# Patient Record
Sex: FEMALE | Race: BLACK OR AFRICAN AMERICAN | NOT HISPANIC OR LATINO | URBAN - METROPOLITAN AREA
[De-identification: names, ages, dates, MRNs, and addresses within clinical notes are randomized per-mention and may not be internally consistent; named-entity substitution may affect disease eponyms.]

---

## 2022-12-21 ENCOUNTER — INPATIENT (INPATIENT)
Facility: HOSPITAL | Age: 77
LOS: 9 days | Discharge: HOME HEALTH | End: 2022-12-31
Attending: HOSPITALIST | Admitting: HOSPITALIST
Payer: MEDICARE

## 2022-12-21 ENCOUNTER — EMERGENCY (EMERGENCY)
Facility: HOSPITAL | Age: 77
LOS: 0 days | Discharge: ANOTHER HOSPITAL | End: 2022-12-21
Attending: EMERGENCY MEDICINE
Payer: MEDICARE

## 2022-12-21 VITALS
HEIGHT: 64 IN | TEMPERATURE: 98 F | DIASTOLIC BLOOD PRESSURE: 71 MMHG | HEART RATE: 96 BPM | OXYGEN SATURATION: 98 % | RESPIRATION RATE: 20 BRPM | SYSTOLIC BLOOD PRESSURE: 152 MMHG

## 2022-12-21 VITALS
DIASTOLIC BLOOD PRESSURE: 56 MMHG | SYSTOLIC BLOOD PRESSURE: 126 MMHG | TEMPERATURE: 98 F | HEART RATE: 108 BPM | OXYGEN SATURATION: 98 % | RESPIRATION RATE: 20 BRPM

## 2022-12-21 VITALS
HEART RATE: 105 BPM | OXYGEN SATURATION: 95 % | SYSTOLIC BLOOD PRESSURE: 170 MMHG | RESPIRATION RATE: 18 BRPM | WEIGHT: 175.05 LBS | DIASTOLIC BLOOD PRESSURE: 69 MMHG | TEMPERATURE: 99 F | HEIGHT: 64 IN

## 2022-12-21 DIAGNOSIS — R22.2 LOCALIZED SWELLING, MASS AND LUMP, TRUNK: ICD-10-CM

## 2022-12-21 DIAGNOSIS — R06.02 SHORTNESS OF BREATH: ICD-10-CM

## 2022-12-21 DIAGNOSIS — J12.82 PNEUMONIA DUE TO CORONAVIRUS DISEASE 2019: ICD-10-CM

## 2022-12-21 DIAGNOSIS — U07.1 COVID-19: ICD-10-CM

## 2022-12-21 DIAGNOSIS — Z79.84 LONG TERM (CURRENT) USE OF ORAL HYPOGLYCEMIC DRUGS: ICD-10-CM

## 2022-12-21 DIAGNOSIS — E03.9 HYPOTHYROIDISM, UNSPECIFIED: ICD-10-CM

## 2022-12-21 DIAGNOSIS — E11.9 TYPE 2 DIABETES MELLITUS WITHOUT COMPLICATIONS: ICD-10-CM

## 2022-12-21 DIAGNOSIS — J18.9 PNEUMONIA, UNSPECIFIED ORGANISM: ICD-10-CM

## 2022-12-21 DIAGNOSIS — J98.59 OTHER DISEASES OF MEDIASTINUM, NOT ELSEWHERE CLASSIFIED: ICD-10-CM

## 2022-12-21 DIAGNOSIS — Z29.9 ENCOUNTER FOR PROPHYLACTIC MEASURES, UNSPECIFIED: ICD-10-CM

## 2022-12-21 DIAGNOSIS — I10 ESSENTIAL (PRIMARY) HYPERTENSION: ICD-10-CM

## 2022-12-21 LAB
ALBUMIN SERPL ELPH-MCNC: 3.3 G/DL — SIGNIFICANT CHANGE UP (ref 3.3–5)
ALP SERPL-CCNC: 95 U/L — SIGNIFICANT CHANGE UP (ref 40–120)
ALT FLD-CCNC: 16 U/L — SIGNIFICANT CHANGE UP (ref 12–78)
ANION GAP SERPL CALC-SCNC: 6 MMOL/L — SIGNIFICANT CHANGE UP (ref 5–17)
APPEARANCE UR: CLEAR — SIGNIFICANT CHANGE UP
AST SERPL-CCNC: 12 U/L — LOW (ref 15–37)
BACTERIA # UR AUTO: ABNORMAL
BASOPHILS # BLD AUTO: 0.01 K/UL — SIGNIFICANT CHANGE UP (ref 0–0.2)
BASOPHILS NFR BLD AUTO: 0.1 % — SIGNIFICANT CHANGE UP (ref 0–2)
BILIRUB SERPL-MCNC: 0.7 MG/DL — SIGNIFICANT CHANGE UP (ref 0.2–1.2)
BILIRUB UR-MCNC: NEGATIVE — SIGNIFICANT CHANGE UP
BUN SERPL-MCNC: 27 MG/DL — HIGH (ref 7–23)
CALCIUM SERPL-MCNC: 9.6 MG/DL — SIGNIFICANT CHANGE UP (ref 8.5–10.1)
CHLORIDE SERPL-SCNC: 101 MMOL/L — SIGNIFICANT CHANGE UP (ref 96–108)
CO2 SERPL-SCNC: 28 MMOL/L — SIGNIFICANT CHANGE UP (ref 22–31)
COLOR SPEC: YELLOW — SIGNIFICANT CHANGE UP
CREAT SERPL-MCNC: 1.08 MG/DL — SIGNIFICANT CHANGE UP (ref 0.5–1.3)
DIFF PNL FLD: ABNORMAL
EGFR: 53 ML/MIN/1.73M2 — LOW
EOSINOPHIL # BLD AUTO: 0.01 K/UL — SIGNIFICANT CHANGE UP (ref 0–0.5)
EOSINOPHIL NFR BLD AUTO: 0.1 % — SIGNIFICANT CHANGE UP (ref 0–6)
EPI CELLS # UR: SIGNIFICANT CHANGE UP
GLUCOSE BLDC GLUCOMTR-MCNC: 195 MG/DL — HIGH (ref 70–99)
GLUCOSE SERPL-MCNC: 285 MG/DL — HIGH (ref 70–99)
GLUCOSE UR QL: 100 MG/DL
HCT VFR BLD CALC: 38 % — SIGNIFICANT CHANGE UP (ref 34.5–45)
HGB BLD-MCNC: 13.2 G/DL — SIGNIFICANT CHANGE UP (ref 11.5–15.5)
IMM GRANULOCYTES NFR BLD AUTO: 0.4 % — SIGNIFICANT CHANGE UP (ref 0–0.9)
KETONES UR-MCNC: NEGATIVE — SIGNIFICANT CHANGE UP
LACTATE SERPL-SCNC: 0.7 MMOL/L — SIGNIFICANT CHANGE UP (ref 0.7–2)
LEUKOCYTE ESTERASE UR-ACNC: ABNORMAL
LYMPHOCYTES # BLD AUTO: 0.82 K/UL — LOW (ref 1–3.3)
LYMPHOCYTES # BLD AUTO: 7.6 % — LOW (ref 13–44)
MCHC RBC-ENTMCNC: 29.1 PG — SIGNIFICANT CHANGE UP (ref 27–34)
MCHC RBC-ENTMCNC: 34.7 G/DL — SIGNIFICANT CHANGE UP (ref 32–36)
MCV RBC AUTO: 83.7 FL — SIGNIFICANT CHANGE UP (ref 80–100)
MONOCYTES # BLD AUTO: 0.56 K/UL — SIGNIFICANT CHANGE UP (ref 0–0.9)
MONOCYTES NFR BLD AUTO: 5.2 % — SIGNIFICANT CHANGE UP (ref 2–14)
NEUTROPHILS # BLD AUTO: 9.28 K/UL — HIGH (ref 1.8–7.4)
NEUTROPHILS NFR BLD AUTO: 86.6 % — HIGH (ref 43–77)
NITRITE UR-MCNC: NEGATIVE — SIGNIFICANT CHANGE UP
NRBC # BLD: 0 /100 WBCS — SIGNIFICANT CHANGE UP (ref 0–0)
NT-PROBNP SERPL-SCNC: 876 PG/ML — HIGH (ref 0–450)
PH UR: 6 — SIGNIFICANT CHANGE UP (ref 5–8)
PLATELET # BLD AUTO: 207 K/UL — SIGNIFICANT CHANGE UP (ref 150–400)
POTASSIUM SERPL-MCNC: 4.2 MMOL/L — SIGNIFICANT CHANGE UP (ref 3.5–5.3)
POTASSIUM SERPL-SCNC: 4.2 MMOL/L — SIGNIFICANT CHANGE UP (ref 3.5–5.3)
PROT SERPL-MCNC: 7.6 GM/DL — SIGNIFICANT CHANGE UP (ref 6–8.3)
PROT UR-MCNC: 500 MG/DL
RAPID RVP RESULT: DETECTED
RBC # BLD: 4.54 M/UL — SIGNIFICANT CHANGE UP (ref 3.8–5.2)
RBC # FLD: 12.9 % — SIGNIFICANT CHANGE UP (ref 10.3–14.5)
RBC CASTS # UR COMP ASSIST: SIGNIFICANT CHANGE UP /HPF (ref 0–4)
SARS-COV-2 RNA SPEC QL NAA+PROBE: DETECTED
SODIUM SERPL-SCNC: 135 MMOL/L — SIGNIFICANT CHANGE UP (ref 135–145)
SP GR SPEC: 1.02 — SIGNIFICANT CHANGE UP (ref 1.01–1.02)
TROPONIN I, HIGH SENSITIVITY RESULT: 9.1 NG/L — SIGNIFICANT CHANGE UP
UROBILINOGEN FLD QL: NEGATIVE MG/DL — SIGNIFICANT CHANGE UP
WBC # BLD: 10.72 K/UL — HIGH (ref 3.8–10.5)
WBC # FLD AUTO: 10.72 K/UL — HIGH (ref 3.8–10.5)
WBC UR QL: SIGNIFICANT CHANGE UP

## 2022-12-21 PROCEDURE — 99284 EMERGENCY DEPT VISIT MOD MDM: CPT

## 2022-12-21 PROCEDURE — 71045 X-RAY EXAM CHEST 1 VIEW: CPT | Mod: 26

## 2022-12-21 PROCEDURE — 71275 CT ANGIOGRAPHY CHEST: CPT | Mod: 26,MA

## 2022-12-21 PROCEDURE — 99285 EMERGENCY DEPT VISIT HI MDM: CPT

## 2022-12-21 PROCEDURE — 93010 ELECTROCARDIOGRAM REPORT: CPT | Mod: 76

## 2022-12-21 PROCEDURE — 99223 1ST HOSP IP/OBS HIGH 75: CPT

## 2022-12-21 PROCEDURE — 99222 1ST HOSP IP/OBS MODERATE 55: CPT

## 2022-12-21 PROCEDURE — 99497 ADVNCD CARE PLAN 30 MIN: CPT | Mod: 25

## 2022-12-21 RX ORDER — FUROSEMIDE 40 MG
40 TABLET ORAL ONCE
Refills: 0 | Status: COMPLETED | OUTPATIENT
Start: 2022-12-21 | End: 2022-12-21

## 2022-12-21 RX ORDER — PIPERACILLIN AND TAZOBACTAM 4; .5 G/20ML; G/20ML
3.38 INJECTION, POWDER, LYOPHILIZED, FOR SOLUTION INTRAVENOUS ONCE
Refills: 0 | Status: DISCONTINUED | OUTPATIENT
Start: 2022-12-22 | End: 2022-12-22

## 2022-12-21 RX ORDER — ATORVASTATIN CALCIUM 80 MG/1
1 TABLET, FILM COATED ORAL
Qty: 0 | Refills: 0 | DISCHARGE

## 2022-12-21 RX ORDER — GLUCAGON INJECTION, SOLUTION 0.5 MG/.1ML
1 INJECTION, SOLUTION SUBCUTANEOUS ONCE
Refills: 0 | Status: DISCONTINUED | OUTPATIENT
Start: 2022-12-21 | End: 2022-12-31

## 2022-12-21 RX ORDER — LATANOPROST 0.05 MG/ML
0 SOLUTION/ DROPS OPHTHALMIC; TOPICAL
Qty: 0 | Refills: 0 | DISCHARGE

## 2022-12-21 RX ORDER — INSULIN GLARGINE 100 [IU]/ML
26 INJECTION, SOLUTION SUBCUTANEOUS AT BEDTIME
Refills: 0 | Status: DISCONTINUED | OUTPATIENT
Start: 2022-12-22 | End: 2022-12-22

## 2022-12-21 RX ORDER — VANCOMYCIN HCL 1 G
1000 VIAL (EA) INTRAVENOUS EVERY 24 HOURS
Refills: 0 | Status: DISCONTINUED | OUTPATIENT
Start: 2022-12-21 | End: 2022-12-22

## 2022-12-21 RX ORDER — CARVEDILOL PHOSPHATE 80 MG/1
0 CAPSULE, EXTENDED RELEASE ORAL
Qty: 0 | Refills: 1 | DISCHARGE

## 2022-12-21 RX ORDER — PIPERACILLIN AND TAZOBACTAM 4; .5 G/20ML; G/20ML
3.38 INJECTION, POWDER, LYOPHILIZED, FOR SOLUTION INTRAVENOUS ONCE
Refills: 0 | Status: COMPLETED | OUTPATIENT
Start: 2022-12-21 | End: 2022-12-21

## 2022-12-21 RX ORDER — LOSARTAN POTASSIUM 100 MG/1
100 TABLET, FILM COATED ORAL DAILY
Refills: 0 | Status: DISCONTINUED | OUTPATIENT
Start: 2022-12-21 | End: 2022-12-22

## 2022-12-21 RX ORDER — INSULIN LISPRO 100/ML
VIAL (ML) SUBCUTANEOUS
Refills: 0 | Status: DISCONTINUED | OUTPATIENT
Start: 2022-12-21 | End: 2022-12-22

## 2022-12-21 RX ORDER — DEXTROSE 50 % IN WATER 50 %
25 SYRINGE (ML) INTRAVENOUS ONCE
Refills: 0 | Status: DISCONTINUED | OUTPATIENT
Start: 2022-12-21 | End: 2022-12-31

## 2022-12-21 RX ORDER — LATANOPROST 0.05 MG/ML
1 SOLUTION/ DROPS OPHTHALMIC; TOPICAL AT BEDTIME
Refills: 0 | Status: DISCONTINUED | OUTPATIENT
Start: 2022-12-21 | End: 2022-12-31

## 2022-12-21 RX ORDER — INSULIN LISPRO 100/ML
VIAL (ML) SUBCUTANEOUS AT BEDTIME
Refills: 0 | Status: DISCONTINUED | OUTPATIENT
Start: 2022-12-21 | End: 2022-12-22

## 2022-12-21 RX ORDER — ATORVASTATIN CALCIUM 80 MG/1
40 TABLET, FILM COATED ORAL AT BEDTIME
Refills: 0 | Status: DISCONTINUED | OUTPATIENT
Start: 2022-12-21 | End: 2022-12-31

## 2022-12-21 RX ORDER — LEVOTHYROXINE SODIUM 125 MCG
0 TABLET ORAL
Qty: 0 | Refills: 0 | DISCHARGE

## 2022-12-21 RX ORDER — CARVEDILOL PHOSPHATE 80 MG/1
25 CAPSULE, EXTENDED RELEASE ORAL EVERY 12 HOURS
Refills: 0 | Status: DISCONTINUED | OUTPATIENT
Start: 2022-12-21 | End: 2022-12-31

## 2022-12-21 RX ORDER — REMDESIVIR 5 MG/ML
INJECTION INTRAVENOUS
Refills: 0 | Status: COMPLETED | OUTPATIENT
Start: 2022-12-21 | End: 2022-12-26

## 2022-12-21 RX ORDER — NIFEDIPINE 30 MG
60 TABLET, EXTENDED RELEASE 24 HR ORAL EVERY 12 HOURS
Refills: 0 | Status: DISCONTINUED | OUTPATIENT
Start: 2022-12-21 | End: 2022-12-22

## 2022-12-21 RX ORDER — TELMISARTAN AND HYDROCHLOROTHIAZIDE 40; 12.5 MG/1; MG/1
1 TABLET ORAL
Qty: 0 | Refills: 0 | DISCHARGE

## 2022-12-21 RX ORDER — NIFEDIPINE 30 MG
60 TABLET, EXTENDED RELEASE 24 HR ORAL
Qty: 0 | Refills: 0 | DISCHARGE

## 2022-12-21 RX ORDER — LEVOTHYROXINE SODIUM 125 MCG
50 TABLET ORAL DAILY
Refills: 0 | Status: DISCONTINUED | OUTPATIENT
Start: 2022-12-21 | End: 2022-12-31

## 2022-12-21 RX ORDER — DEXTROSE 50 % IN WATER 50 %
12.5 SYRINGE (ML) INTRAVENOUS ONCE
Refills: 0 | Status: DISCONTINUED | OUTPATIENT
Start: 2022-12-21 | End: 2022-12-31

## 2022-12-21 RX ORDER — SODIUM CHLORIDE 9 MG/ML
2500 INJECTION INTRAMUSCULAR; INTRAVENOUS; SUBCUTANEOUS ONCE
Refills: 0 | Status: COMPLETED | OUTPATIENT
Start: 2022-12-21 | End: 2022-12-21

## 2022-12-21 RX ORDER — INSULIN GLARGINE 100 [IU]/ML
26 INJECTION, SOLUTION SUBCUTANEOUS ONCE
Refills: 0 | Status: COMPLETED | OUTPATIENT
Start: 2022-12-21 | End: 2022-12-21

## 2022-12-21 RX ORDER — PIPERACILLIN AND TAZOBACTAM 4; .5 G/20ML; G/20ML
3.38 INJECTION, POWDER, LYOPHILIZED, FOR SOLUTION INTRAVENOUS EVERY 6 HOURS
Refills: 0 | Status: DISCONTINUED | OUTPATIENT
Start: 2022-12-22 | End: 2022-12-22

## 2022-12-21 RX ORDER — DEXTROSE 50 % IN WATER 50 %
15 SYRINGE (ML) INTRAVENOUS ONCE
Refills: 0 | Status: DISCONTINUED | OUTPATIENT
Start: 2022-12-21 | End: 2022-12-31

## 2022-12-21 RX ORDER — ACETAMINOPHEN 500 MG
650 TABLET ORAL EVERY 6 HOURS
Refills: 0 | Status: DISCONTINUED | OUTPATIENT
Start: 2022-12-21 | End: 2022-12-31

## 2022-12-21 RX ORDER — SODIUM CHLORIDE 9 MG/ML
1000 INJECTION, SOLUTION INTRAVENOUS
Refills: 0 | Status: DISCONTINUED | OUTPATIENT
Start: 2022-12-21 | End: 2022-12-31

## 2022-12-21 RX ORDER — ACETAMINOPHEN 500 MG
650 TABLET ORAL ONCE
Refills: 0 | Status: COMPLETED | OUTPATIENT
Start: 2022-12-21 | End: 2022-12-21

## 2022-12-21 RX ORDER — METFORMIN HYDROCHLORIDE 850 MG/1
1 TABLET ORAL
Qty: 0 | Refills: 0 | DISCHARGE

## 2022-12-21 RX ORDER — CARVEDILOL PHOSPHATE 80 MG/1
1 CAPSULE, EXTENDED RELEASE ORAL
Qty: 0 | Refills: 0 | DISCHARGE

## 2022-12-21 RX ORDER — LANOLIN ALCOHOL/MO/W.PET/CERES
3 CREAM (GRAM) TOPICAL AT BEDTIME
Refills: 0 | Status: DISCONTINUED | OUTPATIENT
Start: 2022-12-21 | End: 2022-12-31

## 2022-12-21 RX ORDER — ONDANSETRON 8 MG/1
4 TABLET, FILM COATED ORAL EVERY 8 HOURS
Refills: 0 | Status: DISCONTINUED | OUTPATIENT
Start: 2022-12-21 | End: 2022-12-23

## 2022-12-21 RX ADMIN — Medication 650 MILLIGRAM(S): at 08:38

## 2022-12-21 RX ADMIN — PIPERACILLIN AND TAZOBACTAM 200 GRAM(S): 4; .5 INJECTION, POWDER, LYOPHILIZED, FOR SOLUTION INTRAVENOUS at 23:36

## 2022-12-21 RX ADMIN — Medication 40 MILLIGRAM(S): at 12:49

## 2022-12-21 RX ADMIN — SODIUM CHLORIDE 2500 MILLILITER(S): 9 INJECTION INTRAMUSCULAR; INTRAVENOUS; SUBCUTANEOUS at 08:38

## 2022-12-21 RX ADMIN — Medication 650 MILLIGRAM(S): at 08:39

## 2022-12-21 NOTE — ED PROVIDER NOTE - OBJECTIVE STATEMENT
77-year-old woman with history of hypertension, hyperlipidemia, COVID-positive 2 days ago, transferred from [facility] after a mediastinal mass was discovered on CTA chest.  She reports feeling progressively weak over the last several days and presented to the ED due to shortness of breath, during evaluation she was noted to have desaturated to the 70s requiring BiPAP.  Patient was transferred for cardiothoracic surgery evaluation after mediastinal mass was found to be partially compressing the esophagus and trachea.  She reports feeling better this time but endorses some ongoing shortness of breath.

## 2022-12-21 NOTE — H&P ADULT - HISTORY OF PRESENT ILLNESS
78 y/o female with PMHx HTN, DM initially presented to [facility] with complaints of SOB/ dyspnea starting since Sunday.  States went to  at that time tested COVID + on sunday given paxlovide, symptoms have persisted and presented to ED today 78 y/o female with PMHx HTN, DM initially presented to [facility] with complaints of SOB/ dyspnea starting since Sunday. Went to  at that time and tested COVID+ on sunday. Pt given paxlovid, but symptoms persisted prompting her to go to [facility] ED for evaluation. At [facility] pt with worsening dyspnea requiring BiPAP. CTA PE done which was negative but showed large mediastinal mass with mass effect on trachea and esophagus. Pt transferred to [facility] for thoracic surgery evaluation.       78 y/o female with PMHx HTN, DM initially presented to [facility] with complaints of SOB/ dyspnea starting since this morning. Pt reports that her sx started on sunday with rhinorrhea and intermittent cough. She has a known exposure to COVID at work and went to  on sunday and was tested COVID+ on sunday. Pt given paxlovid, but symptoms persisted worsened and she began experiencing SOB/ CARVAJAL prompting her to go to [facility] ED for evaluation. At [facility] pt with worsening dyspnea requiring BiPAP. Pt given Levaquin and lasix x1. CTA PE done which was negative but showed large mediastinal mass with mass effect on trachea and esophagus. Pt transferred to [facility] for thoracic surgery evaluation.    Currently, pt reports that she is feeling better; on 6L NC. Denies any fevers, chills, HA, CP, n/v/d/c, abdominal pain, or muscle aches. Reports she has hx of CVI and has lower extremity swelling that improves at night time.

## 2022-12-21 NOTE — H&P ADULT - PROBLEM SELECTOR PLAN 7
DVT: LMWH  Diet: DASH/carb consistent  Dispo: pending clinical improvement    Full code Pt with hx of hypothyroidism, c/w levothyroxine 50mcg

## 2022-12-21 NOTE — H&P ADULT - NSHPREVIEWOFSYSTEMS_GEN_ALL_CORE
CONSTITUTIONAL: No fever, no chills, no weight loss  EYES: No eye pain, no visual disturbance  RESPIRATORY: No cough, + SOB  CARDIOVASCULAR: No CP, no palpitations  GASTROINTESTINAL: no abdominal pain, no n/v/d  GENITOURINARY: No dysuria, no hematuria  HEME: No easy bruising, no bleeding gums  NEURO: No headaches, no weakness  SKIN: No itching, no rashes  MSK: No joint pain, no joint swelling CONSTITUTIONAL: No fever, no chills, no weight loss  EYES: No eye pain, no visual disturbance  RESPIRATORY: + cough, + SOB  CARDIOVASCULAR: No CP, no palpitations  GASTROINTESTINAL: no abdominal pain, no n/v/d  GENITOURINARY: No dysuria, no hematuria  HEME: No easy bruising, no bleeding gums  NEURO: No headaches, no weakness  SKIN: No itching, no rashes  MSK: No joint pain, no joint swelling

## 2022-12-21 NOTE — CONSULT NOTE ADULT - CRITICAL CARE ATTENDING COMMENT
77 year old with sob and newly found mediastinal mass causing mass effect on esophagus and trache.  would recommend transfer for thoracic surg eval and diagnosis.

## 2022-12-21 NOTE — CONSULT NOTE ADULT - SUBJECTIVE AND OBJECTIVE BOX
77 year old female with PHX: HTN, DM C/O SOB/ dyspnea starting on . States went to  at that time tested COVID + on  given paxlovide, symptoms have persisted and presented to ED today. Denies fevers, CP, ABD pain, N/V/D. Patient transferred from [facility] after CTA with multifocal PNA and Large 7.8 cm heterogeneous structure with internal and peripheral calcification within the middle mediastinum which splays the trachea and esophagus, exerting mass effect on both. She was noted to have desaturated to the 70s requiring BiPAP.    She reports feeling better this time but endorses some ongoing shortness of breath.         PAST MEDICAL & SURGICAL HISTORY:  HTN (hypertension)      DM (diabetes mellitus)    Sternotomy for right partial thyroidectomy- 30 years ago       REVIEW OF SYSTEMS      General:No Weight change/ Fatigue/ HA/Dizzy	    Skin/Breast: No Rashes/ Lesions/ Masses  	  Ophthalmologic: No Blurry vision/ Glaucoma/ Blindness  	  ENMT: No Hearing loss/ Drainage/ Lesions	    Respiratory and Thorax: +SOB, dyspnea, cough  	  Cardiovascular: No Chest pain/ Palpitations/ Diaphoresis	    Gastrointestinal: No Nausea/ Vomiting/ Constipation/ Appetite Change	    Genitourinary: No Heamturia/ Dysuria/ Frequency change/ Impotence	    Musculoskeletal: No Pain/ Weakness/ Claudication	    Neurological: No Seizures/ TIA/CVA/ Parastesias	    Psychiatric: No Dementia/ Depression/ SI/HI	    Hematology/Lymphatics: No hx of bleeding/ +bilateral lower extremity edema 	    Endocrine:	No Hyperglycemia/ Hypoglycemia    Allergic/Immunologic:	 No Anaphylaxis/ Intolerance/ Recent illnesses  REVIEW OF SYSTEMS:     MEDICATIONS  (STANDING):  Nifedipine 60mg BID  Telmisartan HCTZ 80/12.5mg daily  Coreg 25mg bid  lipitor 40mg daily  latanoprost 0.005% 1gtt in each eye at night   levothyroxine 50mcg every other day  levemir 44units every night  metformin 1000mg bid           Allergies    No Known Allergies    Intolerances        SOCIAL HISTORY:  Denies smoking or alcohol use     FAMILY HISTORY:  no pertinent family history     Vital Signs Last 24 Hrs  T(C): 36.7 (21 Dec 2022 20:38), Max: 37.4 (21 Dec 2022 15:46)  T(F): 98.1 (21 Dec 2022 20:38), Max: 99.4 (21 Dec 2022 15:46)  HR: 98 (21 Dec 2022 20:38) (96 - 119)  BP: 156/85 (21 Dec 2022 20:38) (126/56 - 170/69)  BP(mean): --  RR: 23 (21 Dec 2022 20:38) (18 - 23)  SpO2: 100% (21 Dec 2022 20:38) (93% - 100%)    Parameters below as of 21 Dec 2022 20:38  Patient On (Oxygen Delivery Method): nasal cannula  O2 Flow (L/min): 6      General: WN/WD NAD   Neurology: Awake, nonfocal, MICHEL x 4  Eyes: Scleras clear, PERRLA/ EOMI, Gross vision intact  ENT:Gross hearing intact, grossly patent pharynx, no stridor  Neck: Neck supple, trachea midline, No JVD,   Respiratory: CTA B/L, on 100% non rebreather during exam   CV: RRR, S1S2, no murmurs, rubs or gallops  Abdominal: Soft, NT, ND +BS,   Extremities: bilateral lower extremity edema,  + peripheral pulses  Skin: No Rashes, Hematoma, Ecchymosis  Lymphatic: No Neck, axilla, groin LAD  Psych: Oriented x 3, normal affect    LABS:                        13.2   10.72 )-----------( 207      ( 21 Dec 2022 08:40 )             38.0     12-21    135  |  101  |  27<H>  ----------------------------<  285<H>  4.2   |  28  |  1.08    Ca    9.6      21 Dec 2022 08:40    TPro  7.6  /  Alb  3.3  /  TBili  0.7  /  DBili  x   /  AST  12<L>  /  ALT  16  /  AlkPhos  95  12-21      Urinalysis Basic - ( 21 Dec 2022 09:05 )    Color: Yellow / Appearance: Clear / S.020 / pH: x  Gluc: x / Ketone: Negative  / Bili: Negative / Urobili: Negative mg/dL   Blood: x / Protein: 500 mg/dL / Nitrite: Negative   Leuk Esterase: Trace / RBC: 0-2 /HPF / WBC 3-5   Sq Epi: x / Non Sq Epi: Few / Bacteria: Occasional        RADIOLOGY & ADDITIONAL STUDIES:    ASSESSMENT:   77yFemalePAST MEDICAL & SURGICAL HISTORY:  HTN (hypertension)      DM (diabetes mellitus)      HEALTH ISSUES - PROBLEM Dx:  Large mediastinal mass       PLAN:  Medical managemen  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 Barnhart, will continue to follow  77 year old female with PHX: HTN, DM C/O SOB/ dyspnea starting on . States went to  at that time tested COVID + on  given paxlovide, symptoms have persisted and presented to ED today. Denies fevers, CP, ABD pain, N/V/D. Patient transferred from [facility] after CTA with multifocal PNA and Large 7.8 cm heterogeneous structure with internal and peripheral calcification within the middle mediastinum which splays the trachea and esophagus, exerting mass effect on both. She was noted to have desaturated to the 70s requiring BiPAP.    She reports feeling better this time but endorses some ongoing shortness of breath.         PAST MEDICAL & SURGICAL HISTORY:  HTN (hypertension)      DM (diabetes mellitus)    Sternotomy for right partial thyroidectomy- 30 years ago       REVIEW OF SYSTEMS      General:No Weight change/ Fatigue/ HA/Dizzy	    Skin/Breast: No Rashes/ Lesions/ Masses  	  Ophthalmologic: No Blurry vision/ Glaucoma/ Blindness  	  ENMT: No Hearing loss/ Drainage/ Lesions	    Respiratory and Thorax: +SOB, dyspnea, cough  	  Cardiovascular: No Chest pain/ Palpitations/ Diaphoresis	    Gastrointestinal: No Nausea/ Vomiting/ Constipation/ Appetite Change	    Genitourinary: No Heamturia/ Dysuria/ Frequency change/ Impotence	    Musculoskeletal: No Pain/ Weakness/ Claudication	    Neurological: No Seizures/ TIA/CVA/ Parastesias	    Psychiatric: No Dementia/ Depression/ SI/HI	    Hematology/Lymphatics: No hx of bleeding/ +bilateral lower extremity edema 	    Endocrine:	No Hyperglycemia/ Hypoglycemia    Allergic/Immunologic:	 No Anaphylaxis/ Intolerance/ Recent illnesses  REVIEW OF SYSTEMS:     MEDICATIONS  (STANDING):  Nifedipine 60mg BID  Telmisartan HCTZ 80/12.5mg daily  Coreg 25mg bid  lipitor 40mg daily  latanoprost 0.005% 1gtt in each eye at night   levothyroxine 50mcg every other day  levemir 44units every night  metformin 1000mg bid           Allergies    No Known Allergies    Intolerances        SOCIAL HISTORY:  Denies smoking or alcohol use     FAMILY HISTORY:  no pertinent family history     Vital Signs Last 24 Hrs  T(C): 36.7 (21 Dec 2022 20:38), Max: 37.4 (21 Dec 2022 15:46)  T(F): 98.1 (21 Dec 2022 20:38), Max: 99.4 (21 Dec 2022 15:46)  HR: 98 (21 Dec 2022 20:38) (96 - 119)  BP: 156/85 (21 Dec 2022 20:38) (126/56 - 170/69)  BP(mean): --  RR: 23 (21 Dec 2022 20:38) (18 - 23)  SpO2: 100% (21 Dec 2022 20:38) (93% - 100%)    Parameters below as of 21 Dec 2022 20:38  Patient On (Oxygen Delivery Method): nasal cannula  O2 Flow (L/min): 6      General: WN/WD NAD   Neurology: Awake, nonfocal, MICHEL x 4  Eyes: Scleras clear, PERRLA/ EOMI, Gross vision intact  ENT:Gross hearing intact, grossly patent pharynx, no stridor  Neck: Neck supple, trachea midline, No JVD,   Respiratory: CTA B/L, on 100% non rebreather during exam   CV: RRR, S1S2, no murmurs, rubs or gallops  Abdominal: Soft, NT, ND +BS,   Extremities: bilateral lower extremity edema,  + peripheral pulses  Skin: No Rashes, Hematoma, Ecchymosis  Lymphatic: No Neck, axilla, groin LAD  Psych: Oriented x 3, normal affect    LABS:                        13.2   10.72 )-----------( 207      ( 21 Dec 2022 08:40 )             38.0     12-21    135  |  101  |  27<H>  ----------------------------<  285<H>  4.2   |  28  |  1.08    Ca    9.6      21 Dec 2022 08:40    TPro  7.6  /  Alb  3.3  /  TBili  0.7  /  DBili  x   /  AST  12<L>  /  ALT  16  /  AlkPhos  95  12-21      Urinalysis Basic - ( 21 Dec 2022 09:05 )    Color: Yellow / Appearance: Clear / S.020 / pH: x  Gluc: x / Ketone: Negative  / Bili: Negative / Urobili: Negative mg/dL   Blood: x / Protein: 500 mg/dL / Nitrite: Negative   Leuk Esterase: Trace / RBC: 0-2 /HPF / WBC 3-5   Sq Epi: x / Non Sq Epi: Few / Bacteria: Occasional        RADIOLOGY & ADDITIONAL STUDIES:  CTA with multifocal PNA and Large 7.8 cm heterogeneous structure with internal and peripheral calcification within the middle mediastinum which splays the trachea and esophagus, exerting mass effect on both.     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 Barnhart, will continue to follow

## 2022-12-21 NOTE — ED PROVIDER NOTE - CLINICAL SUMMARY MEDICAL DECISION MAKING FREE TEXT BOX
Patient seen and evaluated for shortness of breath.  Patient became progressively worse shortness of breath BNP was sent on and send both negative for etiology of symptoms.  CT angio showed no PE but revealed a large thoracic mass.  Decision to transfer based on opinion of ICU.

## 2022-12-21 NOTE — H&P ADULT - PROBLEM SELECTOR PLAN 2
CT chest with Bilateral patchy airspace opacities, predominantly in the lower lobes, right greater than left, compatible with multifocal pneumonia raising c/f superimposed bacterial PNA  - s/p levaquin at [facility]. Will start ceftriaxone/ Azithromycin for empiric CAP  - f/u blood Cx and UA from [facility]  - Procalcitonin, urine legionella CT chest with Bilateral patchy airspace opacities, predominantly in the lower lobes, right greater than left, compatible with multifocal pneumonia raising c/f superimposed bacterial PNA  - s/p levaquin at [facility]. Will start vancomycin/zosyn; titrate  - f/u blood Cx and UA from [facility]  - Procalcitonin, urine legionella, sputum Cx

## 2022-12-21 NOTE — H&P ADULT - PROBLEM SELECTOR PLAN 5
Hx of DM  - c/w Hx of DM, on metformin, levemir 44u at night  - Start Lantus 26u at bedtime with ISS Hx of HTN, on carvedilol 25mg BID, nifedipine 50mg BID, and telmisartan/hctz combi  - c/w carvedilol 25mg BID, will slowly titrate other medications given sepsis

## 2022-12-21 NOTE — ED ADULT NURSE REASSESSMENT NOTE - NS ED NURSE REASSESS COMMENT FT1
Pt in room 2. Float RN. Pt titrated off NRB to 6L NC per MD order. Pt vitally stable. Denies chest pain, SOB, resp distress at this time. Endorses symptom improvement. Will continue to monitor

## 2022-12-21 NOTE — H&P ADULT - NSHPLABSRESULTS_GEN_ALL_CORE
.  LABS:                         13.2   10.72 )-----------( 207      ( 21 Dec 2022 08:40 )             38.0     12-    135  |  101  |  27<H>  ----------------------------<  285<H>  4.2   |  28  |  1.08    Ca    9.6      21 Dec 2022 08:40    TPro  7.6  /  Alb  3.3  /  TBili  0.7  /  DBili  x   /  AST  12<L>  /  ALT  16  /  AlkPhos  95  12-21      Urinalysis Basic - ( 21 Dec 2022 09:05 )    Color: Yellow / Appearance: Clear / S.020 / pH: x  Gluc: x / Ketone: Negative  / Bili: Negative / Urobili: Negative mg/dL   Blood: x / Protein: 500 mg/dL / Nitrite: Negative   Leuk Esterase: Trace / RBC: 0-2 /HPF / WBC 3-5   Sq Epi: x / Non Sq Epi: Few / Bacteria: Occasional                RADIOLOGY, EKG & ADDITIONAL TESTS: Reviewed.

## 2022-12-21 NOTE — H&P ADULT - PROBLEM SELECTOR PLAN 3
CT chest with Large 7.8 cm heterogeneous structure with internal and peripheral calcification within the middle mediastinum which splays the trachea and esophagus, exerting mass effect on both c/f mediastinal mass. Transferred to [facility] for thoracic eval  - Thoracic surgery following, appreciate recs  - CT chest without contrast with arterial and delayed phase imaging CT chest with Large 7.8 cm heterogeneous structure with internal and peripheral calcification within the middle mediastinum which splays the trachea and esophagus, exerting mass effect on both c/f mediastinal mass. Transferred to [facility] for thoracic eval  - Thoracic surgery following, appreciate recs  - CT chest without contrast with arterial and delayed phase imaging  - Keep head of bed elevated 60 to 90 degrees  - Low threshold for ICU eval if pt with worsening respiratory status given mass

## 2022-12-21 NOTE — ED PROVIDER NOTE - CPE EDP SKIN NORM
Your test for COVID-19, also known as novel coronavirus, came back negative. No virus was detected from the sample collected.      Testing is not 100%.    Until your symptoms are fully resolved, you may still be contagious.      We recommend that you remain isolated for 7 days minimum or 72 hours after your symptoms have completely resolved, whichever is longer.      If you were exposed to a known positive COVID-19 patient, then you must remain isolated for 14 days.     
normal...

## 2022-12-21 NOTE — ED PROVIDER NOTE - PHYSICAL EXAMINATION
Butts:  General: No distress.  Mentation at baseline.   HEENT: WNL  Chest/Lungs: CTAB, No wheeze, No retractions, No increased work of breathing, Normal rate  Heart: S1S2 RRR, No M/R/G, Pules equal Bilaterally in upper and lower extremities distally  Abd: soft, NT/ND, No guarding, No rebound.  No hernias, no palpable masses.  Extrem: FROM in all joints, no significant edema noted, No ulcers.  Cap refil < 2sec.  Skin: No rash noted, warm dry.  Neuro:  Grossly normal.  No difficulty ambulating. No focal deficits.  Psychiatric: No evidence of delusions. No SI/HI. Butts:  General: No distress.  Mentation at baseline.   HEENT: WNL  Chest/Lungs: CTAB, No wheeze, No retractions, No increased work of breathing, Normal rate  Heart: S1S2 RRR, No M/R/G, Pules equal Bilaterally in upper and lower extremities distally  Abd: soft, NT/ND, No guarding, No rebound.  No hernias, no palpable masses.  Extrem: FROM in all joints, significant edema noted B, No ulcers.  Cap refil < 2sec.  Skin: No rash noted, warm dry.  Neuro:  Grossly normal.  No difficulty ambulating. No focal deficits.  Psychiatric: No evidence of delusions. No SI/HI.

## 2022-12-21 NOTE — ED PROVIDER NOTE - OBJECTIVE STATEMENT
Patient's presents with shortness of breath status post diagnosis of COVID 2 days prior.  After treatment patient continues to have shortness of breath and decided to come to the emergency department.  Patient has history of hypertension diabetes.  All medications reviewed.Patient denies chest pain dizziness weakness.

## 2022-12-21 NOTE — H&P ADULT - VTE RISK ASSESSMENT
From: Galina Rodriguez  To: Cassie Santos  Sent: 11/10/2022 10:12 AM CST  Subject: Mounjaro    Good morning Cassie, I am interested in trying Mounjaro injections. My friend was just on it and had wonderful results. What is your thought and how do I go about doing this. I have a coupon code to try it for a month for $25. I know this will not be covered by insurance.  
Please call Merline and let her know I sent in this medication, she needs to come to clinic for a weight and blood pressure check.  I would like to see her 1 month after starting the medication, for review and another weight check   
<<--- Click to launch

## 2022-12-21 NOTE — ED ADULT NURSE NOTE - OBJECTIVE STATEMENT
as per patient c/o SOB x 1 day, cov positive two days prior. Pt is axo4, respirations spontaneous and tachypenic, labored with exertion. pt on 4L NC, on RA at baseline.

## 2022-12-21 NOTE — ED PROVIDER NOTE - ENMT NEGATIVE STATEMENT, MLM
You can access the FollowMyHealth Patient Portal offered by [facility] by registering at the following website: [email or web address]. By joining [facility]’s FollowMyHealth portal, you will also be able to view your health information using other applications (apps) compatible with our system. Ears: no ear pain and no hearing problems. Nose: no nasal congestion and no nasal drainage. Mouth/Throat: no dysphagia, no hoarseness and no throat pain. Neck: no lumps, no pain, no stiffness and no swollen glands.

## 2022-12-21 NOTE — H&P ADULT - PROBLEM SELECTOR PLAN 6
DVT:   Diet:  Dispo: Pt with hx of hypothyroidism, c/w levothyroxine 50mcg Hx of DM, on metformin, levemir 44u at night  - Start Lantus 26u at bedtime with ISS

## 2022-12-21 NOTE — H&P ADULT - PROBLEM SELECTOR PLAN 1
Patient presented with complaints of SOB/dyspena, COVID.+ Requiring supplemental oxygen to maintain O2 saturation >94% meeting criteria for severe COVID-19 infection  - Currently on 6L supplemental oxygen  - Supportive care as needed  - Will initiate Remdesivir 200 mG IV x 1 dose on day 1, followed by 100 mG IV q24h for 4 days or until hospital discharge (contraindicated in pt eGFR less than 30 mL/min); Note longer course in pt requiring high-flow oxygen or NIV  - Will initiate dexamethasone 6 mG daily for up to 10 days  - Patient does not meet criteria for monoclonal antibodies  - Follow-up CBC, CMP, ARLEY, CRP, D-dimer, coags, ferritin in the AM.  - Repeat in 48 hrs: D-dimer, CRP, Ferritin. If clinically worsening, repeat every 48 hours  - Avoid nebulized medications, due to risk of aerosolization. Metered Dose Inhaler (MDI) or Dry Powder Inhaler (DPI) are acceptable.  - Daily renal, hepatic, and prothrombin time (PT) monitoring should be performed while on remdisivir therapy  - Given pt required BiPAP, will start on prophylactic LMWH Patient presented with complaints of SOB/dyspena, COVID.+ Requiring supplemental oxygen to maintain O2 saturation >94% meeting criteria for severe COVID-19 infection  - Currently on 6L supplemental oxygen  - Supportive care as needed  - Will initiate Remdesivir 200 mG IV x 1 dose on day 1, followed by 100 mG IV q24h for 4 days or until hospital discharge (contraindicated in pt eGFR less than 30 mL/min); Note longer course in pt requiring high-flow oxygen or NIV  - Will initiate dexamethasone 6 mG daily for up to 10 days  - Patient does not meet criteria for monoclonal antibodies  - Follow-up CBC, CMP, ARLEY, CRP, D-dimer, coags, ferritin in the AM.  - Repeat in 48 hrs: D-dimer, CRP, Ferritin. If clinically worsening, repeat every 48 hours  - Daily renal, hepatic, and prothrombin time (PT) monitoring should be performed while on remdisivir therapy  - Given pt required BiPAP, will start on prophylactic LMWH

## 2022-12-21 NOTE — H&P ADULT - ATTENDING COMMENTS
This is a 76 y/o F with pmhx of DM2, HTN presented to the ED for new onset shortness of breath. The patient was diagnosed with covid19 infection on Sunday and was given plaxovid for covid19. The patient had been taking it but symptoms persisted. So the patient presented to [place] ED for further workup. They suspected PE however they found a 7cm mediastinal mass with evidence of some compression on the trachea. The patient was then transferred to [facility] for CT surgery eval. The patient at this time still has some SOB while laying flat and wants to This is a 78 y/o F with pmhx of DM2, HTN presented to the ED for new onset shortness of breath. The patient was diagnosed with covid19 infection on Sunday and was given paxovid for covid19. The patient had been taking it but symptoms persisted. So the patient presented to [place] ED for further workup. They did a PE workup which was neg however they found a 7cm mediastinal mass with evidence of some compression on the trachea and also b/l pnuemonia. The patient received abx at [facility]. Denies cough, but endorsed fevers at home. The patient was then transferred to [facility] for CT surgery eval. The patient at this time still has some SOB while laying flat and wants to sit up. The patient was initially on bipap but was weaned off to NC. The patient endorsed hx of partial thyroidectomy 30 years ago. The patient denies weightloss.    Physical exam shows an obese female, NAD, comfortable at bedside, lungs CTA b/l no wheezing, cardiac exam s1s2 rrr no murmurs, abdomen soft nontender to palpation b/l, 2+ edema on the LE b/l.    Labs show leukocytosis 10.72 WBC. BMP wnl, but hyperglycemic. UA neg, CTA show Large 7.8 cm heterogeneous structure with internal and peripheral calcification within the middle mediastinum which splays the trachea and esophagus, exerting mass effect on both. Also has b/l opacities showing pneumonia.    The patient is admitted for sepsis and hypoxic respiratory failure secondary to pneumonia and covid19 infection. Will cover empirically for bacterial pneumonia with zosyn and vanco. Will obtain sputum and blood cultures. Will monitor closely for worsening sepsis. The patient will also be started on remdesivir and dexamethasone because of hypoxic respiratory failure for covid19. Will c/w continuous pulse ox monitoring. In regards to the mediastinal mass, will need work up for cancer. CT surgery has been consulted and requests medical optimization and further imaging before further work up. Will treat infection as above. The patient also need continuous pulse ox monitoring for concerns of airway compromise secondary to the mediastinal mass. Will keep the patient upright, avoid laying flat. Will hold BP meds for now pending sepsis treatment and work up. Will send legionella ag.    I also had a 30 minute GOC discussion with the patient at bedside, she is to be full code, requesting CPR and artifical ventilation of necessary. Patient is a low threshold for MICU consult if the patient shows signs of acute respiratory failure. This is a 76 y/o F with pmhx of DM2, HTN presented to the ED for new onset shortness of breath. The patient was diagnosed with covid19 infection on Sunday and was given paxovid for covid19. The patient had been taking it but symptoms persisted. So the patient presented to [facility] ED for further workup. They did a PE workup which was neg however they found a 7cm mediastinal mass with evidence of some compression on the trachea and also b/l pnuemonia. The patient received abx at [facility]. Denies cough, but endorsed fevers at home. The patient was then transferred to [facility] for CT surgery eval. The patient at this time still has some SOB while laying flat and wants to sit up. The patient was initially on bipap but was weaned off to NC. The patient endorsed hx of partial thyroidectomy 30 years ago. The patient denies weightloss.    Physical exam shows an obese female, NAD, comfortable at bedside, lungs CTA b/l no wheezing, cardiac exam s1s2 rrr no murmurs, abdomen soft nontender to palpation b/l, 2+ edema on the LE b/l.    Labs show leukocytosis 10.72 WBC. BMP wnl, but hyperglycemic. UA neg, CTA show Large 7.8 cm heterogeneous structure with internal and peripheral calcification within the middle mediastinum which splays the trachea and esophagus, exerting mass effect on both. Also has b/l opacities showing pneumonia.    The patient is admitted for sepsis and hypoxic respiratory failure secondary to pneumonia and covid19 infection. Will cover empirically for bacterial pneumonia with zosyn and vanco. Will obtain sputum and blood cultures. Will monitor closely for worsening sepsis. The patient will also be started on remdesivir and dexamethasone because of hypoxic respiratory failure for covid19. Will c/w continuous pulse ox monitoring. In regards to the mediastinal mass, will need work up for cancer. CT surgery has been consulted and requests medical optimization and further imaging before further work up. Will treat infection as above. The patient also need continuous pulse ox monitoring for concerns of airway compromise secondary to the mediastinal mass. Will keep the patient upright, avoid laying flat. Will hold BP meds for now pending sepsis treatment and work up. Will send legionella ag.    I also had a 30 minute GOC discussion with the patient at bedside, she is to be full code, requesting CPR and artifical ventilation of necessary. Patient is a low threshold for MICU consult if the patient shows signs of acute respiratory failure.      I have discussed in detail in regards to the diagnosis, prognosis and plan as above, with the daughter at bedside and with the other daughter on the phone. They expressed understanding. This is a 78 y/o F with pmhx of DM2, HTN presented to the ED for new onset shortness of breath. The patient was diagnosed with covid19 infection on Sunday and was given paxovid for covid19. The patient had been taking it but symptoms persisted. So the patient presented to [place] ED for further workup. They did a PE workup which was neg however they found a 7cm mediastinal mass with evidence of some compression on the trachea and also b/l pnuemonia. The patient received abx at [facility]. Denies cough, but endorsed fevers at home. The patient was then transferred to [facility] for CT surgery eval. The patient at this time still has some SOB while laying flat and wants to sit up. The patient was initially on bipap but was weaned off to NC. The patient endorsed hx of partial thyroidectomy 30 years ago. The patient denies weightloss.    Physical exam shows an obese female, NAD, comfortable at bedside, lungs CTA b/l no wheezing but + crackles in the b/l basilar area, cardiac exam s1s2 rrr no murmurs, abdomen soft nontender to palpation b/l, 2+ edema on the LE b/l.    Labs show leukocytosis 10.72 WBC. BMP wnl, but hyperglycemic. UA neg, CTA show Large 7.8 cm heterogeneous structure with internal and peripheral calcification within the middle mediastinum which splays the trachea and esophagus, exerting mass effect on both. Also has b/l opacities showing pneumonia.    The patient is admitted for sepsis and hypoxic respiratory failure secondary to pneumonia and covid19 infection. Will cover empirically for bacterial pneumonia with zosyn and vanco. Will obtain sputum and blood cultures. Will monitor closely for worsening sepsis. The patient will also be started on remdesivir and dexamethasone because of hypoxic respiratory failure for covid19. Will c/w continuous pulse ox monitoring. In regards to the mediastinal mass, will need work up for cancer. CT surgery has been consulted and requests medical optimization and further imaging before further work up. Will treat infection as above. The patient also need continuous pulse ox monitoring for concerns of airway compromise secondary to the mediastinal mass. Will keep the patient upright, avoid laying flat. Will hold BP meds for now pending sepsis treatment and work up. Will send legionella ag.    I also had a 30 minute GOC discussion with the patient at bedside, she is to be full code, requesting CPR and artifical ventilation of necessary. Patient is a low threshold for MICU consult if the patient shows signs of acute respiratory failure.      I have discussed in detail in regards to the diagnosis, prognosis and plan as above, with the daughter at bedside and with the other daughter on the phone. They expressed understanding.

## 2022-12-21 NOTE — H&P ADULT - NSHPPHYSICALEXAM_GEN_ALL_CORE
.  VITAL SIGNS:  T(C): 36.7 (12-21-22 @ 20:38), Max: 37.4 (12-21-22 @ 15:46)  T(F): 98.1 (12-21-22 @ 20:38), Max: 99.4 (12-21-22 @ 15:46)  HR: 98 (12-21-22 @ 20:38) (96 - 119)  BP: 156/85 (12-21-22 @ 20:38) (126/56 - 170/69)  BP(mean): --  RR: 23 (12-21-22 @ 20:38) (18 - 23)  SpO2: 100% (12-21-22 @ 20:38) (93% - 100%)  Wt(kg): --    PHYSICAL EXAM:    Constitutional: WDWN resting comfortably in bed; NAD  Head: NC/AT  Eyes: PERRL, EOMI, anicteric sclera  ENT: no nasal discharge; uvula midline, no oropharyngeal erythema or exudates; MMM  Neck: supple; no JVD or thyromegaly  Respiratory: CTA B/L; no W/R/R, no retractions  Cardiac: +S1/S2; RRR; no M/R/G; PMI non-displaced  Gastrointestinal: soft, NT/ND; no rebound or guarding; +BSx4  Genitourinary: normal external genitalia  Back: spine midline, no bony tenderness or step-offs; no CVAT B/L  Extremities: WWP, no clubbing or cyanosis; no peripheral edema. Capillary refill <2 sec  Musculoskeletal: NROM x4; no joint swelling, tenderness or erythema  Vascular: 2+ radial, femoral, DP/PT pulses B/L  Dermatologic: skin warm, dry and intact; no rashes, wounds, or scars  Lymphatic: no submandibular or cervical LAD  Neurologic: AAOx3; CNII-XII grossly intact; no focal deficits  Psychiatric: affect and characteristics of appearance, verbalizations, behaviors are appropriate .  VITAL SIGNS:  T(C): 36.7 (12-21-22 @ 20:38), Max: 37.4 (12-21-22 @ 15:46)  T(F): 98.1 (12-21-22 @ 20:38), Max: 99.4 (12-21-22 @ 15:46)  HR: 98 (12-21-22 @ 20:38) (96 - 119)  BP: 156/85 (12-21-22 @ 20:38) (126/56 - 170/69)  BP(mean): --  RR: 23 (12-21-22 @ 20:38) (18 - 23)  SpO2: 100% (12-21-22 @ 20:38) (93% - 100%)  Wt(kg): --    PHYSICAL EXAM:    Constitutional: WDWN resting in bed; NAD  Head: NC/AT  Eyes: PERRL, EOMI, anicteric sclera  ENT: no nasal discharge; uvula midline, no oropharyngeal erythema or exudates; MMM  Neck: supple; no JVD or thyromegaly  Respiratory: fine crackles at the bases, no accessory muscle use  Cardiac: +S1/S2; RRR; no M/R/G  Gastrointestinal: soft, NT/ND; no rebound or guarding; +BSx4  Back: spine midline, no bony tenderness or step-offs; no CVAT B/L  Extremities: WWP, no clubbing or cyanosis; 2+ peripheral edema. Capillary refill <2 sec  Musculoskeletal: NROM x4; no joint swelling, tenderness or erythema  Vascular: 2+ radial, DP/PT pulses B/L  Dermatologic: skin warm, dry and intact; no rashes, wounds, or scars  Lymphatic: no submandibular or cervical LAD  Neurologic: AAOx3; CNII-XII grossly intact; no focal deficits  Psychiatric: affect and characteristics of appearance, verbalizations, behaviors are appropriate

## 2022-12-21 NOTE — CONSULT NOTE ADULT - ASSESSMENT
77 year old female with PHX: HTN, DM C/O SOB/ dyspnea starting on Sunday. States went to  at that time tested COVID + on sunday given paxlovide, symptoms have persisted and presented to ED today. COVID + with multifocal PNA placed on BIPAP for WOB with improvement in symptoms. CT with Large 7.8 cm heterogeneous structure with internal and peripheral calcification within the middle mediastinum which splays the trachea and esophagus, exerting mass effect on both. ICU consulted for Hypoxia & WOB prior to official CT read.     Recommendations  -Continue BIPAP  -Ceftriaxone/ Azithromycin for empiric CAP pending culture results   - Given Paxlovid Sunday for + Covid test.  - Decadron/ Remdesivir for COVID treatment   - Transfer to tertiary care facility for CT surgery consult/ evaluation with Large thoracic mass compressing trachea and esophagus.     Plan of care discussed with patient at bedside, ICU Attending Miriam. Please Reconsult with any concerns, Thank you.

## 2022-12-21 NOTE — ED PROVIDER NOTE - CLINICAL SUMMARY MEDICAL DECISION MAKING FREE TEXT BOX
Patient transferred over for evaluation due to large mediastinal mass found with acute onset of any shortness of breath.  Patient placed on bilevel initial evaluation but converted to nonrebreather.  Patient speaking full sentences no acute distress cleared by CT for inpatient evaluation.  Patient denies medically stable responding well to supplemental oxygen.

## 2022-12-21 NOTE — ED ADULT NURSE REASSESSMENT NOTE - NS ED NURSE REASSESS COMMENT FT1
patient feeling increasing SOB, tachypenia and difficulty breathing after CT, pt satting 90% on 6L NC. Pt placed on non-rebreather with improvement to 96%. MD Butts aware.

## 2022-12-21 NOTE — ED ADULT TRIAGE NOTE - CHIEF COMPLAINT QUOTE
Transfer from [place] on BIPAP, covid + with pneumonia. Charge nurse notified and directly to RM 2.

## 2022-12-21 NOTE — ED ADULT NURSE NOTE - NSIMPLEMENTINTERV_GEN_ALL_ED
Implemented All Universal Safety Interventions:  [place] to call system. Call bell, personal items and telephone within reach. Instruct patient to call for assistance. Room bathroom lighting operational. Non-slip footwear when patient is off stretcher. Physically safe environment: no spills, clutter or unnecessary equipment. Stretcher in lowest position, wheels locked, appropriate side rails in place.

## 2022-12-21 NOTE — CONSULT NOTE ADULT - SUBJECTIVE AND OBJECTIVE BOX
77 year old female with PHX: HTN, DM C/O SOB/ dyspnea starting on . States went to  at that time tested COVID + on  given paxlovide, symptoms have persisted and presented to ED today. Denies fevers, CP, ABD pain, N/V/D.           REVIEW OF SYSTEMS:     CONSTITUTIONAL: No fever, weight loss, or fatigue  EYES: No eye pain, visual disturbances, or discharge  ENMT:  No difficulty hearing, tinnitus, No sinus or throat pain  NECK: No pain or stiffness  RESPIRATORY: +SOB,dyspnea, cough, wheezing,or hemoptysis;   CARDIOVASCULAR: + LE swelling. No chest pain, palpitations, dizziness,  GASTROINTESTINAL: No abdominal or epigastric pain. No nausea, vomiting, or hematemesis; No diarrhea or constipation. No melena or hematochezia.  GENITOURINARY: No dysuria, frequency, hematuria, or incontinence  NEUROLOGICAL: No headaches, memory loss, loss of strength, numbness, or tremors  SKIN: No itching, burning, rashes, or lesions     PHYSICAL EXAM:    GENERAL: On BIPAP, NAD, well-groomed, well-developed  HEAD:  NC/AT  EYES:, PERRLA, conjunctiva and sclera clear  ENMT:  Moist mucous membranes,   NECK: Supple, No JVD,  CHEST/LUNG: Rales RLL. No rhonchi, wheezing, or rubs  HEART: S1/S2 tachycardic rate and regular rhythm; No murmurs, rubs, or gallops  ABDOMEN: Soft, Nontender, Nondistended; Bowel sounds present  EXTREMITIES:  2+ Peripheral Pulses, +2 pitting edema to B/L LE. No clubbing, cyanosis  SKIN: No rashes or lesions  NERVOUS SYSTEM:  Alert & Oriented X3, Good concentration; Motor Strength 5/5 B/L upper and lower extremities    ICU Vital Signs Last 24 Hrs  T(C): 36.4 (21 Dec 2022 17:13), Max: 37.4 (21 Dec 2022 15:46)  T(F): 97.6 (21 Dec 2022 17:13), Max: 99.4 (21 Dec 2022 15:46)  HR: 108 (21 Dec 2022 17:13) (105 - 119)  BP: 126/56 (21 Dec 2022 17:13) (126/56 - 170/69)  BP(mean): --  ABP: --  ABP(mean): --  RR: 20 (21 Dec 2022 17:13) (18 - 20)  SpO2: 98% (21 Dec 2022 17:13) (93% - 98%)    O2 Parameters below as of 21 Dec 2022 17:13  Patient On (Oxygen Delivery Method): BiPAP/CPAP          I&O's Detail    MEDICATIONS  NEURO  Meds:   RESPIRATORY  ABG - ( 21 Dec 2022 08:40 )  pH: x     /  pCO2: x     /  pO2: x     / HCO3: x     / Base Excess: x     /  SaO2: x       Lactate: 0.7          LABS:                        13.2   10.72 )-----------( 207      ( 21 Dec 2022 08:40 )             38.0          135  |  101  |  27<H>  ----------------------------<  285<H>  4.2   |  28  |  1.08    Ca    9.6      21 Dec 2022 08:40    TPro  7.6  /  Alb  3.3  /  TBili  0.7  /  DBili  x   /  AST  12<L>  /  ALT  16  /  AlkPhos  95  -      Urinalysis Basic - ( 21 Dec 2022 09:05 )    Color: Yellow / Appearance: Clear / S.020 / pH: x  Gluc: x / Ketone: Negative  / Bili: Negative / Urobili: Negative mg/dL   Blood: x / Protein: 500 mg/dL / Nitrite: Negative   Leuk Esterase: Trace / RBC: 0-2 /HPF / WBC 3-5   Sq Epi: x / Non Sq Epi: Few / Bacteria: Occasional                RADIOLOGY & ADDITIONAL STUDIES:    < from: CT Angio Chest PE Protocol w/ IV Cont (22 @ 12:29) >  IMPRESSION:  Large 7.8 cm heterogeneous structure with internal and peripheral   calcification within the middle mediastinum which splays the trachea and   esophagus, exerting mass effect on both. Differential includes   mediastinal mass, however vascular structure/aneurysm cannot be excluded.   CT with noncontrast, arterial and delayed phase imaging is recommended.    No pulmonary embolism identified.    Bilateral patchy airspace opacities, predominantly in the lower lobes,   right greater than left, compatible with multifocal pneumonia. Small left   pleural effusion.    Indeterminant 2.6 cm left adrenal nodule.    Critical findings were discussed with Dr. Aguirre on 2022 2:15PM.    --- End of Report ---    ***Please see the addendum at the top of this report. It may contain   additional important information or changes.****    < end of copied text >

## 2022-12-21 NOTE — CONSULT NOTE ADULT - NS ATTEND AMEND GEN_ALL_CORE FT
Patient seen and examined agree with above note as modified, where appropriate, by me. mediastinal mass question cyst vs vascular structure. Will need non contrast CT followed by pre and post contrast images. Unlikely source of her symptoms.

## 2022-12-21 NOTE — H&P ADULT - PROBLEM SELECTOR PLAN 4
Hx of HTN  - c/w Hx of HTN  - c/w carvedilol 25mg BID, nifedipine 50mg BID, and telmisartan/hctz combi Pt meeting sepsis criteria likely 2/2 multifocal PNA and COVID  - treatment as above

## 2022-12-21 NOTE — ED PROVIDER NOTE - PROGRESS NOTE DETAILS
Trung Gama PGY3: Pt tachypneic but otherwise HDS. CT surg evaluated opt and rec admission to medicine. They rec CT chest. Pt accepted for admission to medicine. Stable on 4L NC O2.

## 2022-12-21 NOTE — ED PROVIDER NOTE - ATTENDING CONTRIBUTION TO CARE
77-year-old woman with history of hypertension, hyperlipidemia, COVID-positive 2 days ago, transferred from [facility] after a mediastinal mass was discovered on CTA chest.  She reports feeling progressively weak over the last several days and presented to the ED due to shortness of breath, during evaluation she was noted to have desaturated to the 70s requiring BiPAP.  Patient was transferred for cardiothoracic surgery evaluation after mediastinal mass was found to be partially compressing the esophagus and trachea.  She reports feeling better this time but endorses some ongoing shortness of breath.

## 2022-12-22 DIAGNOSIS — E89.0 POSTPROCEDURAL HYPOTHYROIDISM: Chronic | ICD-10-CM

## 2022-12-22 DIAGNOSIS — A41.9 SEPSIS, UNSPECIFIED ORGANISM: ICD-10-CM

## 2022-12-22 PROBLEM — I10 ESSENTIAL (PRIMARY) HYPERTENSION: Chronic | Status: ACTIVE | Noted: 2022-12-21

## 2022-12-22 PROBLEM — E11.9 TYPE 2 DIABETES MELLITUS WITHOUT COMPLICATIONS: Chronic | Status: ACTIVE | Noted: 2022-12-21

## 2022-12-22 LAB
A1C WITH ESTIMATED AVERAGE GLUCOSE RESULT: 6.5 % — HIGH (ref 4–5.6)
ALBUMIN SERPL ELPH-MCNC: 3.5 G/DL — SIGNIFICANT CHANGE UP (ref 3.3–5)
ALP SERPL-CCNC: 83 U/L — SIGNIFICANT CHANGE UP (ref 40–120)
ALT FLD-CCNC: 8 U/L — SIGNIFICANT CHANGE UP (ref 4–33)
ANION GAP SERPL CALC-SCNC: 10 MMOL/L — SIGNIFICANT CHANGE UP (ref 7–14)
APTT BLD: 28.2 SEC — SIGNIFICANT CHANGE UP (ref 27–36.3)
AST SERPL-CCNC: 13 U/L — SIGNIFICANT CHANGE UP (ref 4–32)
BASOPHILS # BLD AUTO: 0.01 K/UL — SIGNIFICANT CHANGE UP (ref 0–0.2)
BASOPHILS NFR BLD AUTO: 0.1 % — SIGNIFICANT CHANGE UP (ref 0–2)
BILIRUB SERPL-MCNC: 0.8 MG/DL — SIGNIFICANT CHANGE UP (ref 0.2–1.2)
BLOOD GAS ARTERIAL COMPREHENSIVE RESULT: SIGNIFICANT CHANGE UP
BUN SERPL-MCNC: 18 MG/DL — SIGNIFICANT CHANGE UP (ref 7–23)
CALCIUM SERPL-MCNC: 8.8 MG/DL — SIGNIFICANT CHANGE UP (ref 8.4–10.5)
CHLORIDE SERPL-SCNC: 101 MMOL/L — SIGNIFICANT CHANGE UP (ref 98–107)
CHOLEST SERPL-MCNC: 220 MG/DL — HIGH
CO2 SERPL-SCNC: 26 MMOL/L — SIGNIFICANT CHANGE UP (ref 22–31)
CREAT SERPL-MCNC: 1.05 MG/DL — SIGNIFICANT CHANGE UP (ref 0.5–1.3)
CRP SERPL-MCNC: 158.1 MG/L — HIGH
CULTURE RESULTS: SIGNIFICANT CHANGE UP
D DIMER BLD IA.RAPID-MCNC: 449 NG/ML DDU — HIGH
EGFR: 55 ML/MIN/1.73M2 — LOW
EOSINOPHIL # BLD AUTO: 0 K/UL — SIGNIFICANT CHANGE UP (ref 0–0.5)
EOSINOPHIL NFR BLD AUTO: 0 % — SIGNIFICANT CHANGE UP (ref 0–6)
ERYTHROCYTE [SEDIMENTATION RATE] IN BLOOD: 66 MM/HR — HIGH (ref 4–25)
ESTIMATED AVERAGE GLUCOSE: 140 — SIGNIFICANT CHANGE UP
FERRITIN SERPL-MCNC: 106 NG/ML — SIGNIFICANT CHANGE UP (ref 15–150)
GLUCOSE BLDC GLUCOMTR-MCNC: 265 MG/DL — HIGH (ref 70–99)
GLUCOSE BLDC GLUCOMTR-MCNC: 304 MG/DL — HIGH (ref 70–99)
GLUCOSE BLDC GLUCOMTR-MCNC: 307 MG/DL — HIGH (ref 70–99)
GLUCOSE BLDC GLUCOMTR-MCNC: 308 MG/DL — HIGH (ref 70–99)
GLUCOSE BLDC GLUCOMTR-MCNC: 335 MG/DL — HIGH (ref 70–99)
GLUCOSE BLDC GLUCOMTR-MCNC: 365 MG/DL — HIGH (ref 70–99)
GLUCOSE BLDC GLUCOMTR-MCNC: 389 MG/DL — HIGH (ref 70–99)
GLUCOSE SERPL-MCNC: 286 MG/DL — HIGH (ref 70–99)
HCT VFR BLD CALC: 34 % — LOW (ref 34.5–45)
HDLC SERPL-MCNC: 77 MG/DL — SIGNIFICANT CHANGE UP
HGB BLD-MCNC: 11.6 G/DL — SIGNIFICANT CHANGE UP (ref 11.5–15.5)
IANC: 8.4 K/UL — HIGH (ref 1.8–7.4)
IMM GRANULOCYTES NFR BLD AUTO: 0.3 % — SIGNIFICANT CHANGE UP (ref 0–0.9)
INR BLD: 1.18 RATIO — HIGH (ref 0.88–1.16)
LIPID PNL WITH DIRECT LDL SERPL: 126 MG/DL — HIGH
LYMPHOCYTES # BLD AUTO: 0.8 K/UL — LOW (ref 1–3.3)
LYMPHOCYTES # BLD AUTO: 7.9 % — LOW (ref 13–44)
MAGNESIUM SERPL-MCNC: 1.7 MG/DL — SIGNIFICANT CHANGE UP (ref 1.6–2.6)
MCHC RBC-ENTMCNC: 29.1 PG — SIGNIFICANT CHANGE UP (ref 27–34)
MCHC RBC-ENTMCNC: 34.1 GM/DL — SIGNIFICANT CHANGE UP (ref 32–36)
MCV RBC AUTO: 85.2 FL — SIGNIFICANT CHANGE UP (ref 80–100)
MONOCYTES # BLD AUTO: 0.84 K/UL — SIGNIFICANT CHANGE UP (ref 0–0.9)
MONOCYTES NFR BLD AUTO: 8.3 % — SIGNIFICANT CHANGE UP (ref 2–14)
NEUTROPHILS # BLD AUTO: 8.4 K/UL — HIGH (ref 1.8–7.4)
NEUTROPHILS NFR BLD AUTO: 83.4 % — HIGH (ref 43–77)
NON HDL CHOLESTEROL: 143 MG/DL — HIGH
NRBC # BLD: 0 /100 WBCS — SIGNIFICANT CHANGE UP (ref 0–0)
NRBC # FLD: 0 K/UL — SIGNIFICANT CHANGE UP (ref 0–0)
PHOSPHATE SERPL-MCNC: 3.2 MG/DL — SIGNIFICANT CHANGE UP (ref 2.5–4.5)
PLATELET # BLD AUTO: 177 K/UL — SIGNIFICANT CHANGE UP (ref 150–400)
POTASSIUM SERPL-MCNC: 4.4 MMOL/L — SIGNIFICANT CHANGE UP (ref 3.5–5.3)
POTASSIUM SERPL-SCNC: 4.4 MMOL/L — SIGNIFICANT CHANGE UP (ref 3.5–5.3)
PROCALCITONIN SERPL-MCNC: 1.49 NG/ML — HIGH (ref 0.02–0.1)
PROT SERPL-MCNC: 6.7 G/DL — SIGNIFICANT CHANGE UP (ref 6–8.3)
PROTHROM AB SERPL-ACNC: 13.7 SEC — HIGH (ref 10.5–13.4)
RBC # BLD: 3.99 M/UL — SIGNIFICANT CHANGE UP (ref 3.8–5.2)
RBC # FLD: 12.8 % — SIGNIFICANT CHANGE UP (ref 10.3–14.5)
SODIUM SERPL-SCNC: 137 MMOL/L — SIGNIFICANT CHANGE UP (ref 135–145)
SPECIMEN SOURCE: SIGNIFICANT CHANGE UP
TRIGL SERPL-MCNC: 86 MG/DL — SIGNIFICANT CHANGE UP
WBC # BLD: 10.08 K/UL — SIGNIFICANT CHANGE UP (ref 3.8–10.5)
WBC # FLD AUTO: 10.08 K/UL — SIGNIFICANT CHANGE UP (ref 3.8–10.5)

## 2022-12-22 PROCEDURE — 99233 SBSQ HOSP IP/OBS HIGH 50: CPT

## 2022-12-22 PROCEDURE — 93306 TTE W/DOPPLER COMPLETE: CPT | Mod: 26

## 2022-12-22 RX ORDER — IPRATROPIUM/ALBUTEROL SULFATE 18-103MCG
3 AEROSOL WITH ADAPTER (GRAM) INHALATION EVERY 6 HOURS
Refills: 0 | Status: DISCONTINUED | OUTPATIENT
Start: 2022-12-22 | End: 2022-12-31

## 2022-12-22 RX ORDER — DEXAMETHASONE 0.5 MG/5ML
6 ELIXIR ORAL DAILY
Refills: 0 | Status: DISCONTINUED | OUTPATIENT
Start: 2022-12-22 | End: 2022-12-30

## 2022-12-22 RX ORDER — AZITHROMYCIN 500 MG/1
500 TABLET, FILM COATED ORAL ONCE
Refills: 0 | Status: COMPLETED | OUTPATIENT
Start: 2022-12-22 | End: 2022-12-22

## 2022-12-22 RX ORDER — AZITHROMYCIN 500 MG/1
TABLET, FILM COATED ORAL
Refills: 0 | Status: DISCONTINUED | OUTPATIENT
Start: 2022-12-22 | End: 2022-12-26

## 2022-12-22 RX ORDER — DEXAMETHASONE 0.5 MG/5ML
6 ELIXIR ORAL DAILY
Refills: 0 | Status: DISCONTINUED | OUTPATIENT
Start: 2022-12-21 | End: 2022-12-22

## 2022-12-22 RX ORDER — AZITHROMYCIN 500 MG/1
500 TABLET, FILM COATED ORAL EVERY 24 HOURS
Refills: 0 | Status: DISCONTINUED | OUTPATIENT
Start: 2022-12-23 | End: 2022-12-26

## 2022-12-22 RX ORDER — CEFTRIAXONE 500 MG/1
1000 INJECTION, POWDER, FOR SOLUTION INTRAMUSCULAR; INTRAVENOUS EVERY 24 HOURS
Refills: 0 | Status: COMPLETED | OUTPATIENT
Start: 2022-12-22 | End: 2022-12-26

## 2022-12-22 RX ORDER — INSULIN LISPRO 100/ML
7 VIAL (ML) SUBCUTANEOUS
Refills: 0 | Status: DISCONTINUED | OUTPATIENT
Start: 2022-12-22 | End: 2022-12-25

## 2022-12-22 RX ORDER — ENOXAPARIN SODIUM 100 MG/ML
40 INJECTION SUBCUTANEOUS EVERY 24 HOURS
Refills: 0 | Status: DISCONTINUED | OUTPATIENT
Start: 2022-12-22 | End: 2022-12-31

## 2022-12-22 RX ORDER — PIPERACILLIN AND TAZOBACTAM 4; .5 G/20ML; G/20ML
3.38 INJECTION, POWDER, LYOPHILIZED, FOR SOLUTION INTRAVENOUS EVERY 8 HOURS
Refills: 0 | Status: DISCONTINUED | OUTPATIENT
Start: 2022-12-22 | End: 2022-12-22

## 2022-12-22 RX ORDER — PIPERACILLIN AND TAZOBACTAM 4; .5 G/20ML; G/20ML
3.38 INJECTION, POWDER, LYOPHILIZED, FOR SOLUTION INTRAVENOUS EVERY 6 HOURS
Refills: 0 | Status: DISCONTINUED | OUTPATIENT
Start: 2022-12-22 | End: 2022-12-22

## 2022-12-22 RX ORDER — SODIUM CHLORIDE 9 MG/ML
1000 INJECTION, SOLUTION INTRAVENOUS
Refills: 0 | Status: DISCONTINUED | OUTPATIENT
Start: 2022-12-22 | End: 2022-12-23

## 2022-12-22 RX ORDER — MAGNESIUM SULFATE 500 MG/ML
1 VIAL (ML) INJECTION ONCE
Refills: 0 | Status: COMPLETED | OUTPATIENT
Start: 2022-12-22 | End: 2022-12-22

## 2022-12-22 RX ORDER — ALBUTEROL 90 UG/1
2 AEROSOL, METERED ORAL
Refills: 0 | Status: DISCONTINUED | OUTPATIENT
Start: 2022-12-22 | End: 2022-12-31

## 2022-12-22 RX ORDER — INSULIN GLARGINE 100 [IU]/ML
44 INJECTION, SOLUTION SUBCUTANEOUS AT BEDTIME
Refills: 0 | Status: DISCONTINUED | OUTPATIENT
Start: 2022-12-22 | End: 2022-12-24

## 2022-12-22 RX ORDER — INSULIN LISPRO 100/ML
VIAL (ML) SUBCUTANEOUS
Refills: 0 | Status: DISCONTINUED | OUTPATIENT
Start: 2022-12-22 | End: 2022-12-31

## 2022-12-22 RX ORDER — REMDESIVIR 5 MG/ML
200 INJECTION INTRAVENOUS EVERY 24 HOURS
Refills: 0 | Status: COMPLETED | OUTPATIENT
Start: 2022-12-21 | End: 2022-12-22

## 2022-12-22 RX ORDER — REMDESIVIR 5 MG/ML
100 INJECTION INTRAVENOUS EVERY 24 HOURS
Refills: 0 | Status: COMPLETED | OUTPATIENT
Start: 2022-12-23 | End: 2022-12-26

## 2022-12-22 RX ORDER — INSULIN LISPRO 100/ML
5 VIAL (ML) SUBCUTANEOUS
Refills: 0 | Status: DISCONTINUED | OUTPATIENT
Start: 2022-12-22 | End: 2022-12-22

## 2022-12-22 RX ORDER — INSULIN LISPRO 100/ML
VIAL (ML) SUBCUTANEOUS AT BEDTIME
Refills: 0 | Status: DISCONTINUED | OUTPATIENT
Start: 2022-12-22 | End: 2022-12-31

## 2022-12-22 RX ADMIN — ATORVASTATIN CALCIUM 40 MILLIGRAM(S): 80 TABLET, FILM COATED ORAL at 23:49

## 2022-12-22 RX ADMIN — CARVEDILOL PHOSPHATE 25 MILLIGRAM(S): 80 CAPSULE, EXTENDED RELEASE ORAL at 01:03

## 2022-12-22 RX ADMIN — SODIUM CHLORIDE 75 MILLILITER(S): 9 INJECTION, SOLUTION INTRAVENOUS at 02:22

## 2022-12-22 RX ADMIN — INSULIN GLARGINE 44 UNIT(S): 100 INJECTION, SOLUTION SUBCUTANEOUS at 23:50

## 2022-12-22 RX ADMIN — Medication 50 MICROGRAM(S): at 06:31

## 2022-12-22 RX ADMIN — CEFTRIAXONE 100 MILLIGRAM(S): 500 INJECTION, POWDER, FOR SOLUTION INTRAMUSCULAR; INTRAVENOUS at 12:08

## 2022-12-22 RX ADMIN — PIPERACILLIN AND TAZOBACTAM 25 GRAM(S): 4; .5 INJECTION, POWDER, LYOPHILIZED, FOR SOLUTION INTRAVENOUS at 07:13

## 2022-12-22 RX ADMIN — REMDESIVIR 500 MILLIGRAM(S): 5 INJECTION INTRAVENOUS at 06:30

## 2022-12-22 RX ADMIN — Medication 100 GRAM(S): at 15:31

## 2022-12-22 RX ADMIN — Medication 7 UNIT(S): at 18:17

## 2022-12-22 RX ADMIN — CARVEDILOL PHOSPHATE 25 MILLIGRAM(S): 80 CAPSULE, EXTENDED RELEASE ORAL at 18:27

## 2022-12-22 RX ADMIN — AZITHROMYCIN 255 MILLIGRAM(S): 500 TABLET, FILM COATED ORAL at 12:45

## 2022-12-22 RX ADMIN — Medication 3: at 07:47

## 2022-12-22 RX ADMIN — Medication 8: at 18:27

## 2022-12-22 RX ADMIN — Medication 6 MILLIGRAM(S): at 06:30

## 2022-12-22 RX ADMIN — Medication 4: at 12:10

## 2022-12-22 RX ADMIN — Medication 250 MILLIGRAM(S): at 01:03

## 2022-12-22 RX ADMIN — ENOXAPARIN SODIUM 40 MILLIGRAM(S): 100 INJECTION SUBCUTANEOUS at 12:08

## 2022-12-22 RX ADMIN — Medication 3 MILLILITER(S): at 05:38

## 2022-12-22 RX ADMIN — LATANOPROST 1 DROP(S): 0.05 SOLUTION/ DROPS OPHTHALMIC; TOPICAL at 21:00

## 2022-12-22 RX ADMIN — INSULIN GLARGINE 26 UNIT(S): 100 INJECTION, SOLUTION SUBCUTANEOUS at 02:22

## 2022-12-22 NOTE — ED ADULT NURSE REASSESSMENT NOTE - NS ED NURSE REASSESS COMMENT FT1
Pt in room 2.  Pt A&Ox4. Pt reassessed and noted to be belly breathing despite being on 3L NC. RR 30-35, O2 sat was 97-98% on 3L NC, but pt was persistently belly breathing and audible wheezing noted despite pt oxygen changed to NRB. Hospitalist Yovani Reagan notified and came to assess pt. Per MD, pt to be given 3x duonebs and post duonebs, pt to go back on BIPAP. Pt endorses mild symptom improvement post medicating with duonebs. Vitally stable otherwise.

## 2022-12-22 NOTE — PROGRESS NOTE ADULT - ASSESSMENT
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

## 2022-12-22 NOTE — PROGRESS NOTE ADULT - PROBLEM SELECTOR PLAN 2
CT chest with Bilateral patchy airspace opacities, predominantly in the lower lobes, right greater than left, most consistent with COVID pneumonia, but started on broad spectum abx on admission for empiric coverage of bacterial pneumonia  - Narrow abx to CTX/azithromycin for empiric CAP coverage. Consider d/c abx on 12/23   - Procalcitonin elevated, CAP vs COVID infection  - F/up urine legionella and sputum culture

## 2022-12-22 NOTE — ED ADULT NURSE REASSESSMENT NOTE - NS ED NURSE REASSESS COMMENT FT1
Pt placed on high flow o2 by respiratory at this time. Report given to ESSU 1. pt transferred over. Pts daughter remains at bedside.

## 2022-12-22 NOTE — ED ADULT NURSE REASSESSMENT NOTE - NS ED NURSE REASSESS COMMENT FT1
Pt received awake and alert x 4 vs wnl pt is afebrile. Pt tolerating bipap well. Pt denies sob or chest pain. Pt instructed on how to use call bell.

## 2022-12-22 NOTE — CHART NOTE - NSCHARTNOTEFT_GEN_A_CORE
Was called by RN that the patient is tachypneic and belly breathing and wheezing. By the time I arrived the patient is on a non-rebreather, and the family member at bedside said that she has improved. There was no wheezing on exam but crackles noted b/l, which is expected from pneumonia. The patient also said her breathing is better after being on the non-rebreather.    Plan:  - start duonebs  - will palce back on BiPAP  - ABG to be obtained  - discussed with resident and RN at bedside of above plan Was called by RN that the patient is tachypneic and belly breathing and wheezing. By the time I arrived the patient is on a non-rebreather, and the family member at bedside said that she has improved. There was no wheezing on exam but crackles noted b/l, which is expected from pneumonia. The patient also said her breathing is better after being on the non-rebreather.    Plan:  - start duonebs  - will palce back on BiPAP  - ABG to be obtained  - low threshold for intubation if patient fails bipap. Will also call MICU consult if patient fails bipap  - discussed with resident and RN at bedside of above plan Was called by RN that the patient is tachypneic and belly breathing and wheezing. By the time I arrived the patient is on a non-rebreather, and the family member at bedside said that she has improved. There was no wheezing on exam but crackles noted b/l, which is expected from pneumonia. The patient also said her breathing is better after being on the non-rebreather. Curently at 99% SpO2, RR 25    Plan:  - start duonebs  - will palce back on BiPAP  - ABG to be obtained  - low threshold for intubation if patient fails bipap. Will also call MICU consult if patient fails bipap  - discussed with resident and RN at bedside of above plan Was called by RN that the patient is tachypneic and belly breathing and wheezing. By the time I arrived the patient is on a non-rebreather, and the family member at bedside said that she has improved. There was no wheezing on exam but crackles noted b/l, which is expected from pneumonia. The patient also said her breathing is better after being on the non-rebreather. Curently at 99% SpO2, RR 25. EKG also reviewed: Sinus tach    Plan:  - start duonebs  - will palce back on BiPAP  - ABG to be obtained  - low threshold for intubation if patient fails bipap. Will also call MICU consult if patient fails bipap  - discussed with resident and RN at bedside of above plan

## 2022-12-23 DIAGNOSIS — R63.8 OTHER SYMPTOMS AND SIGNS CONCERNING FOOD AND FLUID INTAKE: ICD-10-CM

## 2022-12-23 DIAGNOSIS — E78.5 HYPERLIPIDEMIA, UNSPECIFIED: ICD-10-CM

## 2022-12-23 DIAGNOSIS — E27.8 OTHER SPECIFIED DISORDERS OF ADRENAL GLAND: ICD-10-CM

## 2022-12-23 DIAGNOSIS — I10 ESSENTIAL (PRIMARY) HYPERTENSION: ICD-10-CM

## 2022-12-23 DIAGNOSIS — Q89.2 CONGENITAL MALFORMATIONS OF OTHER ENDOCRINE GLANDS: ICD-10-CM

## 2022-12-23 DIAGNOSIS — E11.9 TYPE 2 DIABETES MELLITUS WITHOUT COMPLICATIONS: ICD-10-CM

## 2022-12-23 DIAGNOSIS — J18.9 PNEUMONIA, UNSPECIFIED ORGANISM: ICD-10-CM

## 2022-12-23 LAB
ANION GAP SERPL CALC-SCNC: 7 MMOL/L — SIGNIFICANT CHANGE UP (ref 7–14)
BUN SERPL-MCNC: 23 MG/DL — SIGNIFICANT CHANGE UP (ref 7–23)
CALCIUM SERPL-MCNC: 8.9 MG/DL — SIGNIFICANT CHANGE UP (ref 8.4–10.5)
CHLORIDE SERPL-SCNC: 99 MMOL/L — SIGNIFICANT CHANGE UP (ref 98–107)
CO2 SERPL-SCNC: 27 MMOL/L — SIGNIFICANT CHANGE UP (ref 22–31)
CREAT SERPL-MCNC: 1.03 MG/DL — SIGNIFICANT CHANGE UP (ref 0.5–1.3)
EGFR: 56 ML/MIN/1.73M2 — LOW
GLUCOSE BLDC GLUCOMTR-MCNC: 254 MG/DL — HIGH (ref 70–99)
GLUCOSE BLDC GLUCOMTR-MCNC: 309 MG/DL — HIGH (ref 70–99)
GLUCOSE BLDC GLUCOMTR-MCNC: 353 MG/DL — HIGH (ref 70–99)
GLUCOSE BLDC GLUCOMTR-MCNC: 432 MG/DL — HIGH (ref 70–99)
GLUCOSE SERPL-MCNC: 283 MG/DL — HIGH (ref 70–99)
HCT VFR BLD CALC: 31.5 % — LOW (ref 34.5–45)
HGB BLD-MCNC: 10.9 G/DL — LOW (ref 11.5–15.5)
MAGNESIUM SERPL-MCNC: 2.1 MG/DL — SIGNIFICANT CHANGE UP (ref 1.6–2.6)
MCHC RBC-ENTMCNC: 29.1 PG — SIGNIFICANT CHANGE UP (ref 27–34)
MCHC RBC-ENTMCNC: 34.6 GM/DL — SIGNIFICANT CHANGE UP (ref 32–36)
MCV RBC AUTO: 84 FL — SIGNIFICANT CHANGE UP (ref 80–100)
NRBC # BLD: 0 /100 WBCS — SIGNIFICANT CHANGE UP (ref 0–0)
NRBC # FLD: 0 K/UL — SIGNIFICANT CHANGE UP (ref 0–0)
PHOSPHATE SERPL-MCNC: 3.3 MG/DL — SIGNIFICANT CHANGE UP (ref 2.5–4.5)
PLATELET # BLD AUTO: 181 K/UL — SIGNIFICANT CHANGE UP (ref 150–400)
POTASSIUM SERPL-MCNC: 4.5 MMOL/L — SIGNIFICANT CHANGE UP (ref 3.5–5.3)
POTASSIUM SERPL-SCNC: 4.5 MMOL/L — SIGNIFICANT CHANGE UP (ref 3.5–5.3)
RBC # BLD: 3.75 M/UL — LOW (ref 3.8–5.2)
RBC # FLD: 12.5 % — SIGNIFICANT CHANGE UP (ref 10.3–14.5)
SODIUM SERPL-SCNC: 133 MMOL/L — LOW (ref 135–145)
WBC # BLD: 8.44 K/UL — SIGNIFICANT CHANGE UP (ref 3.8–10.5)
WBC # FLD AUTO: 8.44 K/UL — SIGNIFICANT CHANGE UP (ref 3.8–10.5)

## 2022-12-23 PROCEDURE — 99233 SBSQ HOSP IP/OBS HIGH 50: CPT

## 2022-12-23 RX ORDER — LOSARTAN POTASSIUM 100 MG/1
100 TABLET, FILM COATED ORAL DAILY
Refills: 0 | Status: DISCONTINUED | OUTPATIENT
Start: 2022-12-23 | End: 2022-12-31

## 2022-12-23 RX ORDER — PANTOPRAZOLE SODIUM 20 MG/1
40 TABLET, DELAYED RELEASE ORAL
Refills: 0 | Status: DISCONTINUED | OUTPATIENT
Start: 2022-12-23 | End: 2022-12-31

## 2022-12-23 RX ORDER — SENNA PLUS 8.6 MG/1
2 TABLET ORAL AT BEDTIME
Refills: 0 | Status: COMPLETED | OUTPATIENT
Start: 2022-12-23 | End: 2022-12-23

## 2022-12-23 RX ADMIN — LOSARTAN POTASSIUM 100 MILLIGRAM(S): 100 TABLET, FILM COATED ORAL at 17:14

## 2022-12-23 RX ADMIN — Medication 2: at 22:10

## 2022-12-23 RX ADMIN — Medication 7 UNIT(S): at 17:15

## 2022-12-23 RX ADMIN — Medication 8: at 17:15

## 2022-12-23 RX ADMIN — CEFTRIAXONE 100 MILLIGRAM(S): 500 INJECTION, POWDER, FOR SOLUTION INTRAMUSCULAR; INTRAVENOUS at 11:32

## 2022-12-23 RX ADMIN — REMDESIVIR 500 MILLIGRAM(S): 5 INJECTION INTRAVENOUS at 09:32

## 2022-12-23 RX ADMIN — INSULIN GLARGINE 44 UNIT(S): 100 INJECTION, SOLUTION SUBCUTANEOUS at 22:11

## 2022-12-23 RX ADMIN — Medication 50 MICROGRAM(S): at 06:25

## 2022-12-23 RX ADMIN — LATANOPROST 1 DROP(S): 0.05 SOLUTION/ DROPS OPHTHALMIC; TOPICAL at 22:10

## 2022-12-23 RX ADMIN — Medication 4: at 00:02

## 2022-12-23 RX ADMIN — CARVEDILOL PHOSPHATE 25 MILLIGRAM(S): 80 CAPSULE, EXTENDED RELEASE ORAL at 17:14

## 2022-12-23 RX ADMIN — Medication 7 UNIT(S): at 09:51

## 2022-12-23 RX ADMIN — Medication 10: at 09:51

## 2022-12-23 RX ADMIN — ATORVASTATIN CALCIUM 40 MILLIGRAM(S): 80 TABLET, FILM COATED ORAL at 22:11

## 2022-12-23 RX ADMIN — Medication 12: at 13:22

## 2022-12-23 RX ADMIN — AZITHROMYCIN 255 MILLIGRAM(S): 500 TABLET, FILM COATED ORAL at 11:32

## 2022-12-23 RX ADMIN — Medication 7 UNIT(S): at 13:22

## 2022-12-23 RX ADMIN — ENOXAPARIN SODIUM 40 MILLIGRAM(S): 100 INJECTION SUBCUTANEOUS at 11:32

## 2022-12-23 RX ADMIN — SENNA PLUS 2 TABLET(S): 8.6 TABLET ORAL at 22:41

## 2022-12-23 RX ADMIN — CARVEDILOL PHOSPHATE 25 MILLIGRAM(S): 80 CAPSULE, EXTENDED RELEASE ORAL at 06:25

## 2022-12-23 RX ADMIN — Medication 6 MILLIGRAM(S): at 06:42

## 2022-12-23 NOTE — PROGRESS NOTE ADULT - PROBLEM SELECTOR PLAN 2
- as above  - CTA chest with BL patchy airspace opacities, predominantly in the lower lobes, R>L c/w multifocal PNA and small L pleural effusion  - likely in setting of known COVID with superimposed bacterial PNA  - procal 1.49  - s/p vanc/zosyn  - c/w CTX/azithromycin x7d total course  - f/u sputum cx  - f/u urine legionella   - management as above

## 2022-12-23 NOTE — PROGRESS NOTE ADULT - ASSESSMENT
Pt called by writer. Pt informed of orders for fasting pre-clinic lab tests. Pt instructed to go to any [facility] lab 3-7 days prior to appt and fast (nothing to eat and only water to drink) for 12 hours prior to presenting at lab. Pt verbalizes understanding of instructions.  Encouraged to call if has questions/concerns.  Pt discontinued omeprazole (PRILOSEC) 40 MG capsule for IBS because she felt better. Pt states she would like to have a refill of this medication as she feels better when she takes it. Pt told message will be routed to Dr. Rodas. Message routed to Dr. Rodas.          Pt is a 78 yo F with PMH HTN, HLD, T2D, and partial thyroidectomy p/t OSH with worsening weakness, SOB, hypoxia, and known COVID+ (urgent care 12/18). Found to have COVID with superimposed multifocal PNA and mediastinal mass compressing the esophagus and trachea. Transferred to [facility] for CT Sx evaluation. Initially on BIPAP, now on HFNC and being weaned. On remdesivir/decardon and CTX/azithromycin.

## 2022-12-23 NOTE — CHART NOTE - NSCHARTNOTEFT_GEN_A_CORE
Pt awaiting CT chest without contrast with arterial and delayed phase imaging.  Has been ordered, will follow up.    Vital Signs Last 24 Hrs  T(C): 37.9 (22 Dec 2022 22:00), Max: 37.9 (22 Dec 2022 22:00)  T(F): 100.3 (22 Dec 2022 22:00), Max: 100.3 (22 Dec 2022 22:00)  HR: 90 (23 Dec 2022 07:33) (85 - 99)  BP: 148/65 (23 Dec 2022 06:00) (116/46 - 148/65)  BP(mean): --  RR: 17 (23 Dec 2022 07:33) (17 - 22)  SpO2: 97% (23 Dec 2022 07:33) (86% - 100%)    Parameters below as of 23 Dec 2022 07:33  Patient On (Oxygen Delivery Method): nasal cannula, high flow  O2 Flow (L/min): 20  O2 Concentration (%): 65                          10.9   8.44  )-----------( 181      ( 23 Dec 2022 06:41 )             31.5       12-23    133<L>  |  99  |  23  ----------------------------<  283<H>  4.5   |  27  |  1.03    Ca    8.9      23 Dec 2022 06:41  Phos  3.3     12-23  Mg     2.10     12-23    TPro  6.7  /  Alb  3.5  /  TBili  0.8  /  DBili  x   /  AST  13  /  ALT  8   /  AlkPhos  83  12-22    PLAN:  -Continue medical management per primary  -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 Awaiting imagining   -Above discussed with Dr. Barnhart, will continue to follow, contact with concerns

## 2022-12-24 LAB
ALBUMIN SERPL ELPH-MCNC: 3.6 G/DL — SIGNIFICANT CHANGE UP (ref 3.3–5)
ALP SERPL-CCNC: 116 U/L — SIGNIFICANT CHANGE UP (ref 40–120)
ALT FLD-CCNC: 14 U/L — SIGNIFICANT CHANGE UP (ref 4–33)
ANION GAP SERPL CALC-SCNC: 10 MMOL/L — SIGNIFICANT CHANGE UP (ref 7–14)
AST SERPL-CCNC: 13 U/L — SIGNIFICANT CHANGE UP (ref 4–32)
BILIRUB SERPL-MCNC: 0.4 MG/DL — SIGNIFICANT CHANGE UP (ref 0.2–1.2)
BUN SERPL-MCNC: 23 MG/DL — SIGNIFICANT CHANGE UP (ref 7–23)
CALCIUM SERPL-MCNC: 9.5 MG/DL — SIGNIFICANT CHANGE UP (ref 8.4–10.5)
CHLORIDE SERPL-SCNC: 103 MMOL/L — SIGNIFICANT CHANGE UP (ref 98–107)
CO2 SERPL-SCNC: 27 MMOL/L — SIGNIFICANT CHANGE UP (ref 22–31)
CREAT SERPL-MCNC: 0.88 MG/DL — SIGNIFICANT CHANGE UP (ref 0.5–1.3)
EGFR: 68 ML/MIN/1.73M2 — SIGNIFICANT CHANGE UP
GLUCOSE BLDC GLUCOMTR-MCNC: 202 MG/DL — HIGH (ref 70–99)
GLUCOSE BLDC GLUCOMTR-MCNC: 245 MG/DL — HIGH (ref 70–99)
GLUCOSE BLDC GLUCOMTR-MCNC: 265 MG/DL — HIGH (ref 70–99)
GLUCOSE BLDC GLUCOMTR-MCNC: 284 MG/DL — HIGH (ref 70–99)
GLUCOSE SERPL-MCNC: 218 MG/DL — HIGH (ref 70–99)
HCT VFR BLD CALC: 35.5 % — SIGNIFICANT CHANGE UP (ref 34.5–45)
HGB BLD-MCNC: 12 G/DL — SIGNIFICANT CHANGE UP (ref 11.5–15.5)
MAGNESIUM SERPL-MCNC: 2.2 MG/DL — SIGNIFICANT CHANGE UP (ref 1.6–2.6)
MCHC RBC-ENTMCNC: 28.9 PG — SIGNIFICANT CHANGE UP (ref 27–34)
MCHC RBC-ENTMCNC: 33.8 GM/DL — SIGNIFICANT CHANGE UP (ref 32–36)
MCV RBC AUTO: 85.5 FL — SIGNIFICANT CHANGE UP (ref 80–100)
NRBC # BLD: 0 /100 WBCS — SIGNIFICANT CHANGE UP (ref 0–0)
NRBC # FLD: 0 K/UL — SIGNIFICANT CHANGE UP (ref 0–0)
PHOSPHATE SERPL-MCNC: 3.6 MG/DL — SIGNIFICANT CHANGE UP (ref 2.5–4.5)
PLATELET # BLD AUTO: 206 K/UL — SIGNIFICANT CHANGE UP (ref 150–400)
POTASSIUM SERPL-MCNC: 4.2 MMOL/L — SIGNIFICANT CHANGE UP (ref 3.5–5.3)
POTASSIUM SERPL-SCNC: 4.2 MMOL/L — SIGNIFICANT CHANGE UP (ref 3.5–5.3)
PROT SERPL-MCNC: 7.3 G/DL — SIGNIFICANT CHANGE UP (ref 6–8.3)
RBC # BLD: 4.15 M/UL — SIGNIFICANT CHANGE UP (ref 3.8–5.2)
RBC # FLD: 12.7 % — SIGNIFICANT CHANGE UP (ref 10.3–14.5)
SODIUM SERPL-SCNC: 140 MMOL/L — SIGNIFICANT CHANGE UP (ref 135–145)
WBC # BLD: 10.8 K/UL — HIGH (ref 3.8–10.5)
WBC # FLD AUTO: 10.8 K/UL — HIGH (ref 3.8–10.5)

## 2022-12-24 PROCEDURE — 99232 SBSQ HOSP IP/OBS MODERATE 35: CPT

## 2022-12-24 RX ORDER — HYDRALAZINE HCL 50 MG
2.5 TABLET ORAL ONCE
Refills: 0 | Status: COMPLETED | OUTPATIENT
Start: 2022-12-24 | End: 2022-12-24

## 2022-12-24 RX ORDER — INSULIN GLARGINE 100 [IU]/ML
48 INJECTION, SOLUTION SUBCUTANEOUS AT BEDTIME
Refills: 0 | Status: DISCONTINUED | OUTPATIENT
Start: 2022-12-24 | End: 2022-12-26

## 2022-12-24 RX ADMIN — PANTOPRAZOLE SODIUM 40 MILLIGRAM(S): 20 TABLET, DELAYED RELEASE ORAL at 06:26

## 2022-12-24 RX ADMIN — Medication 50 MICROGRAM(S): at 06:26

## 2022-12-24 RX ADMIN — ENOXAPARIN SODIUM 40 MILLIGRAM(S): 100 INJECTION SUBCUTANEOUS at 11:50

## 2022-12-24 RX ADMIN — CARVEDILOL PHOSPHATE 25 MILLIGRAM(S): 80 CAPSULE, EXTENDED RELEASE ORAL at 18:08

## 2022-12-24 RX ADMIN — CARVEDILOL PHOSPHATE 25 MILLIGRAM(S): 80 CAPSULE, EXTENDED RELEASE ORAL at 07:04

## 2022-12-24 RX ADMIN — Medication 6: at 18:08

## 2022-12-24 RX ADMIN — LOSARTAN POTASSIUM 100 MILLIGRAM(S): 100 TABLET, FILM COATED ORAL at 07:04

## 2022-12-24 RX ADMIN — AZITHROMYCIN 255 MILLIGRAM(S): 500 TABLET, FILM COATED ORAL at 12:43

## 2022-12-24 RX ADMIN — REMDESIVIR 500 MILLIGRAM(S): 5 INJECTION INTRAVENOUS at 06:25

## 2022-12-24 RX ADMIN — Medication 4: at 09:04

## 2022-12-24 RX ADMIN — Medication 7 UNIT(S): at 09:04

## 2022-12-24 RX ADMIN — Medication 6: at 12:42

## 2022-12-24 RX ADMIN — ATORVASTATIN CALCIUM 40 MILLIGRAM(S): 80 TABLET, FILM COATED ORAL at 22:22

## 2022-12-24 RX ADMIN — CEFTRIAXONE 100 MILLIGRAM(S): 500 INJECTION, POWDER, FOR SOLUTION INTRAMUSCULAR; INTRAVENOUS at 11:50

## 2022-12-24 RX ADMIN — Medication 7 UNIT(S): at 12:41

## 2022-12-24 RX ADMIN — Medication 7 UNIT(S): at 18:08

## 2022-12-24 RX ADMIN — Medication 6 MILLIGRAM(S): at 06:25

## 2022-12-24 RX ADMIN — Medication 2.5 MILLIGRAM(S): at 23:39

## 2022-12-24 RX ADMIN — INSULIN GLARGINE 48 UNIT(S): 100 INJECTION, SOLUTION SUBCUTANEOUS at 22:20

## 2022-12-24 NOTE — PROGRESS NOTE ADULT - PROBLEM SELECTOR PLAN 2
- CTA chest with BL patchy airspace opacities, predominantly in the lower lobes, R>L c/w multifocal PNA and small L pleural effusion  - likely in setting of known COVID with superimposed bacterial PNA  - procal 1.49  - s/p vanc/zosyn  - c/w CTX/azithromycin x7d total course  - BCx NGTD   - f/u sputum cx

## 2022-12-24 NOTE — PROGRESS NOTE ADULT - ASSESSMENT
Pt is a 78 yo F with PMH HTN, HLD, T2D, and partial thyroidectomy p/t OSH with worsening weakness, SOB, hypoxia, and known COVID+ (urgent care 12/18). Found to have COVID with superimposed multifocal PNA and mediastinal mass. Transferred to [facility] for CT Sx evaluation.

## 2022-12-24 NOTE — PROGRESS NOTE ADULT - ASSESSMENT
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  Pt is s/p sternotomy and partial thyroidectomy 30 yrs ago. Repeat CT Chest w/ contrast pending. Will follow. Cont care per primary team

## 2022-12-25 DIAGNOSIS — R73.9 HYPERGLYCEMIA, UNSPECIFIED: ICD-10-CM

## 2022-12-25 DIAGNOSIS — E03.9 HYPOTHYROIDISM, UNSPECIFIED: ICD-10-CM

## 2022-12-25 DIAGNOSIS — R19.5 OTHER FECAL ABNORMALITIES: ICD-10-CM

## 2022-12-25 LAB
ALBUMIN SERPL ELPH-MCNC: 3 G/DL — LOW (ref 3.3–5)
ALP SERPL-CCNC: 103 U/L — SIGNIFICANT CHANGE UP (ref 40–120)
ALT FLD-CCNC: 16 U/L — SIGNIFICANT CHANGE UP (ref 4–33)
ANION GAP SERPL CALC-SCNC: 8 MMOL/L — SIGNIFICANT CHANGE UP (ref 7–14)
AST SERPL-CCNC: 14 U/L — SIGNIFICANT CHANGE UP (ref 4–32)
BILIRUB SERPL-MCNC: 0.3 MG/DL — SIGNIFICANT CHANGE UP (ref 0.2–1.2)
BUN SERPL-MCNC: 21 MG/DL — SIGNIFICANT CHANGE UP (ref 7–23)
CALCIUM SERPL-MCNC: 9 MG/DL — SIGNIFICANT CHANGE UP (ref 8.4–10.5)
CHLORIDE SERPL-SCNC: 102 MMOL/L — SIGNIFICANT CHANGE UP (ref 98–107)
CO2 SERPL-SCNC: 28 MMOL/L — SIGNIFICANT CHANGE UP (ref 22–31)
CREAT SERPL-MCNC: 0.79 MG/DL — SIGNIFICANT CHANGE UP (ref 0.5–1.3)
CRP SERPL-MCNC: 36.8 MG/L — HIGH
D DIMER BLD IA.RAPID-MCNC: 625 NG/ML DDU — HIGH
EGFR: 77 ML/MIN/1.73M2 — SIGNIFICANT CHANGE UP
FERRITIN SERPL-MCNC: 190 NG/ML — HIGH (ref 15–150)
GLUCOSE BLDC GLUCOMTR-MCNC: 137 MG/DL — HIGH (ref 70–99)
GLUCOSE BLDC GLUCOMTR-MCNC: 174 MG/DL — HIGH (ref 70–99)
GLUCOSE BLDC GLUCOMTR-MCNC: 241 MG/DL — HIGH (ref 70–99)
GLUCOSE BLDC GLUCOMTR-MCNC: 262 MG/DL — HIGH (ref 70–99)
GLUCOSE BLDC GLUCOMTR-MCNC: 273 MG/DL — HIGH (ref 70–99)
GLUCOSE SERPL-MCNC: 252 MG/DL — HIGH (ref 70–99)
HCT VFR BLD CALC: 34.1 % — LOW (ref 34.5–45)
HGB BLD-MCNC: 11.4 G/DL — LOW (ref 11.5–15.5)
MAGNESIUM SERPL-MCNC: 2 MG/DL — SIGNIFICANT CHANGE UP (ref 1.6–2.6)
MCHC RBC-ENTMCNC: 28.5 PG — SIGNIFICANT CHANGE UP (ref 27–34)
MCHC RBC-ENTMCNC: 33.4 GM/DL — SIGNIFICANT CHANGE UP (ref 32–36)
MCV RBC AUTO: 85.3 FL — SIGNIFICANT CHANGE UP (ref 80–100)
NRBC # BLD: 0 /100 WBCS — SIGNIFICANT CHANGE UP (ref 0–0)
NRBC # FLD: 0 K/UL — SIGNIFICANT CHANGE UP (ref 0–0)
PHOSPHATE SERPL-MCNC: 3.1 MG/DL — SIGNIFICANT CHANGE UP (ref 2.5–4.5)
PLATELET # BLD AUTO: 204 K/UL — SIGNIFICANT CHANGE UP (ref 150–400)
POTASSIUM SERPL-MCNC: 3.7 MMOL/L — SIGNIFICANT CHANGE UP (ref 3.5–5.3)
POTASSIUM SERPL-SCNC: 3.7 MMOL/L — SIGNIFICANT CHANGE UP (ref 3.5–5.3)
PROT SERPL-MCNC: 6.5 G/DL — SIGNIFICANT CHANGE UP (ref 6–8.3)
RBC # BLD: 4 M/UL — SIGNIFICANT CHANGE UP (ref 3.8–5.2)
RBC # FLD: 12.6 % — SIGNIFICANT CHANGE UP (ref 10.3–14.5)
SODIUM SERPL-SCNC: 138 MMOL/L — SIGNIFICANT CHANGE UP (ref 135–145)
WBC # BLD: 8.23 K/UL — SIGNIFICANT CHANGE UP (ref 3.8–10.5)
WBC # FLD AUTO: 8.23 K/UL — SIGNIFICANT CHANGE UP (ref 3.8–10.5)

## 2022-12-25 PROCEDURE — 99222 1ST HOSP IP/OBS MODERATE 55: CPT | Mod: GC

## 2022-12-25 PROCEDURE — 99232 SBSQ HOSP IP/OBS MODERATE 35: CPT

## 2022-12-25 PROCEDURE — 99231 SBSQ HOSP IP/OBS SF/LOW 25: CPT

## 2022-12-25 RX ORDER — INSULIN LISPRO 100/ML
15 VIAL (ML) SUBCUTANEOUS
Refills: 0 | Status: DISCONTINUED | OUTPATIENT
Start: 2022-12-25 | End: 2022-12-26

## 2022-12-25 RX ORDER — NIFEDIPINE 30 MG
60 TABLET, EXTENDED RELEASE 24 HR ORAL
Refills: 0 | Status: DISCONTINUED | OUTPATIENT
Start: 2022-12-25 | End: 2022-12-31

## 2022-12-25 RX ORDER — HYDRALAZINE HCL 50 MG
2.5 TABLET ORAL ONCE
Refills: 0 | Status: COMPLETED | OUTPATIENT
Start: 2022-12-25 | End: 2022-12-25

## 2022-12-25 RX ADMIN — PANTOPRAZOLE SODIUM 40 MILLIGRAM(S): 20 TABLET, DELAYED RELEASE ORAL at 09:30

## 2022-12-25 RX ADMIN — Medication 7 UNIT(S): at 13:00

## 2022-12-25 RX ADMIN — CARVEDILOL PHOSPHATE 25 MILLIGRAM(S): 80 CAPSULE, EXTENDED RELEASE ORAL at 17:36

## 2022-12-25 RX ADMIN — Medication 50 MICROGRAM(S): at 05:11

## 2022-12-25 RX ADMIN — CEFTRIAXONE 100 MILLIGRAM(S): 500 INJECTION, POWDER, FOR SOLUTION INTRAMUSCULAR; INTRAVENOUS at 11:02

## 2022-12-25 RX ADMIN — Medication 6: at 09:31

## 2022-12-25 RX ADMIN — LOSARTAN POTASSIUM 100 MILLIGRAM(S): 100 TABLET, FILM COATED ORAL at 05:10

## 2022-12-25 RX ADMIN — AZITHROMYCIN 255 MILLIGRAM(S): 500 TABLET, FILM COATED ORAL at 11:02

## 2022-12-25 RX ADMIN — INSULIN GLARGINE 48 UNIT(S): 100 INJECTION, SOLUTION SUBCUTANEOUS at 22:52

## 2022-12-25 RX ADMIN — Medication 60 MILLIGRAM(S): at 10:20

## 2022-12-25 RX ADMIN — Medication 2.5 MILLIGRAM(S): at 14:41

## 2022-12-25 RX ADMIN — Medication 2: at 17:34

## 2022-12-25 RX ADMIN — Medication 15 UNIT(S): at 17:35

## 2022-12-25 RX ADMIN — Medication 60 MILLIGRAM(S): at 21:14

## 2022-12-25 RX ADMIN — Medication 7 UNIT(S): at 09:30

## 2022-12-25 RX ADMIN — Medication 6: at 13:00

## 2022-12-25 RX ADMIN — ENOXAPARIN SODIUM 40 MILLIGRAM(S): 100 INJECTION SUBCUTANEOUS at 11:03

## 2022-12-25 RX ADMIN — Medication 6 MILLIGRAM(S): at 05:11

## 2022-12-25 RX ADMIN — CARVEDILOL PHOSPHATE 25 MILLIGRAM(S): 80 CAPSULE, EXTENDED RELEASE ORAL at 05:10

## 2022-12-25 RX ADMIN — ATORVASTATIN CALCIUM 40 MILLIGRAM(S): 80 TABLET, FILM COATED ORAL at 21:14

## 2022-12-25 RX ADMIN — REMDESIVIR 500 MILLIGRAM(S): 5 INJECTION INTRAVENOUS at 06:55

## 2022-12-25 NOTE — CONSULT NOTE ADULT - ATTENDING COMMENTS
DM2 with COVID on dexamethasone with subsequent steroid exacerbated hyperglycemia. At home takes levemir 46 units BID and metformin. Here will adjust basal bolus insulin as outlined. Will follow.    Kevin Medel MD  Division of Endocrinology  Pager: [phone]    If after 6PM or before 9AM, or on weekends/holidays, please call endocrine answering service for assistance ([phone]).  For nonurgent matters email [email or web address] for assistance.

## 2022-12-25 NOTE — PROGRESS NOTE ADULT - ASSESSMENT
Pt is a 76 yo F with PMH HTN, HLD, T2D, and partial thyroidectomy p/t OSH with worsening weakness, SOB, hypoxia, and known COVID+ (urgent care 12/18). Found to have COVID with superimposed multifocal PNA and mediastinal mass. Transferred to [facility] for CT Sx evaluation.

## 2022-12-25 NOTE — CONSULT NOTE ADULT - ASSESSMENT
Poorly controlled T2DM with hyperglycemia  Steroid Hyperglycemia  - HbA1c: 6.5  - Home Regimen:   - Endocrinologist:  PLAN  - Hold oral DM agents while inpatient  - Start Lantus  units at bedtime. DO NOT HOLD IF NPO.  - Start Admelog  units TID pre-meal. HOLD IF NPO.  - Use moderate/Use low dose Admelog correction scale pre-meal  - Use moderate/Use low dose Admelog correction scale at bedtime  - Fingerstick BG before meals and bedtime  - Goal -180  - Carbohydrate consistent diet  - RD consult  Discharge plan:  - Discharge medications: ************************  - Patient will need education on how to self-administer insulin, how to check FSBG, as well as hypoglycemia management. Patient to call doctor with persistent high or low BG at home.   - Ensure patient has glucometer, test strips and lancets on discharge.  - Recommend routine outpatient ophthalmology, podiatry and endocrinology f/u    HTN  - Home regimen:  PLAN  - Continue  - Outpatient goal BP <130/80. Management per primary team.    HLD  - Home regimen:  PLAN  - Continue  - Would likely benefit from a statin if no contraindication  - Can check lipid profile if not done recently    Discussed with primary team.    Braxton Dailey MD, Endocrinology Fellow  Pager [phone] from 9am to 5pm. After hours and on weekends, please call [phone].   76 y/o female with PMHx HTN, Hypothyroidism, DM initially presented to [facility] with complaints of SOB/ dyspnea starting since this morning. Pt reports that her sx started on sunday with rhinorrhea and intermittent cough found to have COVID-19 infection. Endocrinology consulted for management of diabetes and steroid hyperglycemia.    Poorly controlled T2DM with hyperglycemia  Steroid Hyperglycemia  - HbA1c: 6.5  - Home Regimen: levemir 46 units bid, metformin 1000mg bid  - Endocrinologist: does not have  - currently on dexamethasone 6mg daily  PLAN  - Hold oral DM agents while inpatient  - Start Lantus  units at bedtime. DO NOT HOLD IF NPO.  - Start Admelog  units TID pre-meal. HOLD IF NPO.  - Use moderate/Use low dose Admelog correction scale pre-meal  - Use moderate/Use low dose Admelog correction scale at bedtime  - Fingerstick BG before meals and bedtime  - Goal -180  - Carbohydrate consistent diet  - RD consult  Discharge plan:  - Discharge medications: ************************  - Patient will need education on how to self-administer insulin, how to check FSBG, as well as hypoglycemia management. Patient to call doctor with persistent high or low BG at home.   - Ensure patient has glucometer, test strips and lancets on discharge.  - Recommend routine outpatient ophthalmology, podiatry and endocrinology f/u    Hypothyroidism  Hx Goiter  Partial thyroidectomy  - patient reports hx of goiter with weight loss 25 years ago s/p partial thyroidectomy  - on levothyroxine 50mcg daily  - appears clinically euthyroid  PLAN  - continue levothyroxine 50mcg daily (maintain on empty stomach (at least 1 hour before meals), separate by 4 hours from PPI or calcium supplementation which can inhibit its absorption)    HTN  - Home regimen:  PLAN  - Continue  - Outpatient goal BP <130/80. Management per primary team.    HLD  - Home regimen:  PLAN  - Continue  - Would likely benefit from a statin if no contraindication  - Can check lipid profile if not done recently    Discussed with primary team.    Braxton Dailey MD, Endocrinology Fellow  Pager [phone] from 9am to 5pm. After hours and on weekends, please call [phone].   78 y/o female with PMHx HTN, Hypothyroidism, DM initially presented to [facility] with complaints of SOB/ dyspnea starting since this morning. Pt reports that her sx started on sunday with rhinorrhea and intermittent cough found to have COVID-19 infection. Endocrinology consulted for management of diabetes and steroid hyperglycemia.    Poorly controlled T2DM with hyperglycemia  Steroid Hyperglycemia  - HbA1c: 6.5  - Home Regimen: levemir 46 units bid, metformin 1000mg bid  - Endocrinologist: does not have  - currently on dexamethasone 6mg daily for COVID  PLAN  - Hold oral DM agents while inpatient  - Continue Lantus 48 units at bedtime. DO NOT HOLD IF NPO.  - Start Admelog 15 units TID pre-meal. HOLD IF NPO.  - Use moderate dose Admelog correction scale pre-meal  - Use moderate dose Admelog correction scale at bedtime  - Fingerstick BG before meals and bedtime  - Goal -180  - Carbohydrate consistent diet  Discharge plan:  - Discharge medications: home regimen  - Patient will need education on how to self-administer insulin, how to check FSBG, as well as hypoglycemia management. Patient to call doctor with persistent high or low BG at home.   - Ensure patient has glucometer, test strips and lancets on discharge.  - Recommend routine outpatient ophthalmology, podiatry and endocrinology f/u    Hypothyroidism  Hx Goiter  Partial thyroidectomy  - patient reports hx of goiter with weight loss 25 years ago s/p partial thyroidectomy  - on levothyroxine 50mcg daily  - appears clinically euthyroid  PLAN  - continue levothyroxine 50mcg daily (maintain on empty stomach (at least 1 hour before meals), separate by 4 hours from PPI or calcium supplementation which can inhibit its absorption)    Left Adrenal Gland Nodule  - CT showed indeterminant left adrenal gland nodule  - patient is on high dose steroids with dexamethasone  - did not appear cushingoid on exam  - patient is hypertensive, K and Na within normal limits  PLAN  - can check am plasma metanephrines, serum aldosterone and plasma renin activity  - defer cushings workup to outpatient given on dexamethasone    HTN  - Home regimen: carvedilol, nifedipine, telmisartan-hctz  PLAN  - Can check urine microalbumin outpatient  - Outpatient goal BP <130/80. Management per primary team.  - above adrenal gland nodule    HLD  - Home regimen: atorvastatin 40mg daily  PLAN  - Continue atorvastatin 40mg daily  - Can check lipid profile if not done recently    Discussed with primary team.    Braxton Dailey MD, Endocrinology Fellow  Pager [phone] from 9am to 5pm. After hours and on weekends, please call [phone].     76 y/o female with PMHx HTN, Hypothyroidism, DM initially presented to [facility] with complaints of SOB/ dyspnea starting since this morning. Pt reports that her sx started on sunday with rhinorrhea and intermittent cough found to have COVID-19 infection. Endocrinology consulted for management of diabetes and steroid hyperglycemia.    Poorly controlled T2DM with hyperglycemia  Steroid Hyperglycemia  - HbA1c: 6.5  - Home Regimen: levemir 46 units bid, metformin 1000mg bid  - Endocrinologist: does not have  - currently on dexamethasone 6mg daily for COVID  PLAN  - Hold oral DM agents while inpatient  - can change IV medication to non dextrose containing formulation  - Continue Lantus 48 units at bedtime. DO NOT HOLD IF NPO.  - Start Admelog 15 units TID pre-meal. HOLD IF NPO.  - Use moderate dose Admelog correction scale pre-meal  - Use moderate dose Admelog correction scale at bedtime  - Fingerstick BG before meals and bedtime  - Goal -180  - Carbohydrate consistent diet  Discharge plan:  - Discharge medications: home regimen  - Patient will need education on how to self-administer insulin, how to check FSBG, as well as hypoglycemia management. Patient to call doctor with persistent high or low BG at home.   - Ensure patient has glucometer, test strips and lancets on discharge.  - Recommend routine outpatient ophthalmology, podiatry and endocrinology f/u    Hypothyroidism  Hx Goiter  Partial thyroidectomy  - patient reports hx of goiter with weight loss 25 years ago s/p partial thyroidectomy  - on levothyroxine 50mcg daily  - appears clinically euthyroid  PLAN  - continue levothyroxine 50mcg daily (maintain on empty stomach (at least 1 hour before meals), separate by 4 hours from PPI or calcium supplementation which can inhibit its absorption)    Left Adrenal Gland Nodule  - CT showed indeterminant left adrenal gland nodule  - patient is on high dose steroids with dexamethasone  - did not appear cushingoid on exam  - patient is hypertensive, K and Na within normal limits  PLAN  - can check am plasma metanephrines, serum aldosterone and plasma renin activity  - defer cushings workup to outpatient given on dexamethasone    HTN  - Home regimen: carvedilol, nifedipine, telmisartan-hctz  PLAN  - Can check urine microalbumin outpatient  - Outpatient goal BP <130/80. Management per primary team.  - above adrenal gland nodule    HLD  - Home regimen: atorvastatin 40mg daily  PLAN  - Continue atorvastatin 40mg daily  - Can check lipid profile if not done recently    Discussed with primary team.    Braxton Dailey MD, Endocrinology Fellow  Pager [phone] from 9am to 5pm. After hours and on weekends, please call [phone].     76 y/o female with PMHx HTN, Hypothyroidism, DM initially presented to [facility] with complaints of SOB/ dyspnea starting since this morning. Pt reports that her sx started on sunday with rhinorrhea and intermittent cough found to have COVID-19 infection. Endocrinology consulted for management of diabetes and steroid hyperglycemia.    Poorly controlled T2DM with hyperglycemia  Steroid Hyperglycemia  - HbA1c: 6.5  - Home Regimen: levemir 46 units bid, metformin 1000mg bid  - Endocrinologist: does not have  - currently on dexamethasone 6mg daily for COVID  PLAN  - Hold oral DM agents while inpatient  - can change IV medication to non dextrose containing formulation  - Continue Lantus 48 units at bedtime. DO NOT HOLD IF NPO.  - Start Admelog 15 units TID pre-meal. HOLD IF NPO.  - Use moderate dose Admelog correction scale pre-meal  - Use moderate dose Admelog correction scale at bedtime  - Fingerstick BG before meals and bedtime  - Goal -180  - Carbohydrate consistent diet  Discharge plan:  - Discharge medications: home regimen  - Patient will need education on how to self-administer insulin, how to check FSBG, as well as hypoglycemia management. Patient to call doctor with persistent high or low BG at home.   - Ensure patient has glucometer, test strips and lancets on discharge.  - Recommend routine outpatient ophthalmology, podiatry and endocrinology f/u    Hypothyroidism  Hx Goiter  Partial thyroidectomy  - patient reports hx of goiter with weight loss 25 years ago s/p partial thyroidectomy  - on levothyroxine 50mcg daily  - appears clinically euthyroid  PLAN  - continue levothyroxine 50mcg daily (maintain on empty stomach (at least 1 hour before meals), separate by 4 hours from PPI or calcium supplementation which can inhibit its absorption)  - check TSH and FT4 in am    Left Adrenal Gland Nodule  - CT showed indeterminant left adrenal gland nodule  - patient is on high dose steroids with dexamethasone  - did not appear cushingoid on exam  - patient is hypertensive, K and Na within normal limits  PLAN  - can check am plasma metanephrines, serum aldosterone and plasma renin activity  - defer cushings workup to outpatient given on dexamethasone    HTN  - Home regimen: carvedilol, nifedipine, telmisartan-hctz  PLAN  - Can check urine microalbumin outpatient  - Outpatient goal BP <130/80. Management per primary team.  - above adrenal gland nodule    HLD  - Home regimen: atorvastatin 40mg daily  PLAN  - Continue atorvastatin 40mg daily  - Can check lipid profile if not done recently    Discussed with primary team.    Braxton Dailey MD, Endocrinology Fellow  Pager [phone] from 9am to 5pm. After hours and on weekends, please call [phone].

## 2022-12-25 NOTE — CONSULT NOTE ADULT - SUBJECTIVE AND OBJECTIVE BOX
ENDOCRINE INITIAL CONSULT - diabetes    HPI:  78 y/o female with PMHx HTN, DM initially presented to [facility] with complaints of SOB/ dyspnea starting since this morning. Pt reports that her sx started on sunday with rhinorrhea and intermittent cough. She has a known exposure to COVID at work and went to  on sunday and was tested COVID+ on sunday. Pt given paxlovid, but symptoms persisted worsened and she began experiencing SOB/ CARVAJAL prompting her to go to [facility] ED for evaluation. At [facility] pt with worsening dyspnea requiring BiPAP. Pt given Levaquin and lasix x1. CTA PE done which was negative but showed large mediastinal mass with mass effect on trachea and esophagus. Pt transferred to [facility] for thoracic surgery evaluation.    Currently, pt reports that she is feeling better; on 6L NC. Denies any fevers, chills, HA, CP, n/v/d/c, abdominal pain, or muscle aches. Reports she has hx of CVI and has lower extremity swelling that improves at night time.    (21 Dec 2022 21:44)      ENDOCRINE HISTORY   Diagnosed with DM:   Last HbA1c: 6.5  Endocrinologist:   Piero review of medications:  Home DM Meds:  Adherence:  Microvascular complications: Renal, opthalmologic, neuropathy  Macrovascular complications:  SMBG:  Symptoms:  Hypoglycemia episodes:  BG at home:  Diet at home:  Appetite in hospital:  Exercise:  PMHx:  PSHx:  Family hx:  Social hx:  Insurance:  Resides in:      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)      Home Medications:  atorvastatin 40 mg oral tablet: 1 tab(s) orally once a day (21 Dec 2022 23:56)  CARVEDILOL 25 MG TABLET: TAKE 1 TABLET BY MOUTH TWICE A DAY (21 Dec 2022 23:56)  latanoprost 0.005% ophthalmic solution: to each affected eye once a day (at bedtime) (21 Dec 2022 23:56)  Levemir FlexPen 100 units/mL subcutaneous solution:  (21 Dec 2022 23:56)  levothyroxine 50 mcg (0.05 mg) oral tablet: orally every other day  (21 Dec 2022 23:56)  metFORMIN 1000 mg oral tablet: 1 tab(s) orally 2 times a day (21 Dec 2022 23:56)  NIFEdipine: 60 milligram(s) orally 2 times a day (21 Dec 2022 23:56)  telmisartan-hydrochlorothiazide 80 mg-12.5 mg oral tablet: 1 tab(s) orally once a day (21 Dec 2022 23:56)      MEDICATIONS  (STANDING):  albuterol    90 MICROgram(s) HFA Inhaler 2 Puff(s) Inhalation every 3 hours  atorvastatin 40 milliGRAM(s) Oral at bedtime  azithromycin  IVPB      azithromycin  IVPB 500 milliGRAM(s) IV Intermittent every 24 hours  carvedilol 25 milliGRAM(s) Oral every 12 hours  cefTRIAXone   IVPB 1000 milliGRAM(s) IV Intermittent every 24 hours  dexAMETHasone  Injectable 6 milliGRAM(s) IV Push daily  dextrose 5%. 1000 milliLiter(s) (100 mL/Hr) IV Continuous <Continuous>  dextrose 5%. 1000 milliLiter(s) (50 mL/Hr) IV Continuous <Continuous>  dextrose 50% Injectable 25 Gram(s) IV Push once  dextrose 50% Injectable 12.5 Gram(s) IV Push once  dextrose 50% Injectable 25 Gram(s) IV Push once  enoxaparin Injectable 40 milliGRAM(s) SubCutaneous every 24 hours  glucagon  Injectable 1 milliGRAM(s) IntraMuscular once  insulin glargine Injectable (LANTUS) 48 Unit(s) SubCutaneous at bedtime  insulin lispro (ADMELOG) corrective regimen sliding scale   SubCutaneous three times a day before meals  insulin lispro (ADMELOG) corrective regimen sliding scale   SubCutaneous at bedtime  insulin lispro Injectable (ADMELOG) 7 Unit(s) SubCutaneous three times a day before meals  latanoprost 0.005% Ophthalmic Solution 1 Drop(s) Both EYES at bedtime  levothyroxine 50 MICROGram(s) Oral daily  losartan 100 milliGRAM(s) Oral daily  NIFEdipine XL 60 milliGRAM(s) Oral two times a day  pantoprazole    Tablet 40 milliGRAM(s) Oral before breakfast  remdesivir  IVPB 100 milliGRAM(s) IV Intermittent every 24 hours  remdesivir  IVPB   IV Intermittent     MEDICATIONS  (PRN):  acetaminophen     Tablet .. 650 milliGRAM(s) Oral every 6 hours PRN Temp greater or equal to 38C (100.4F), Mild Pain (1 - 3)  albuterol/ipratropium for Nebulization 3 milliLiter(s) Nebulizer every 6 hours PRN Shortness of Breath and/or Wheezing  dextrose Oral Gel 15 Gram(s) Oral once PRN Blood Glucose LESS THAN 70 milliGRAM(s)/deciliter  melatonin 3 milliGRAM(s) Oral at bedtime PRN Insomnia      Allergies    No Known Allergies    Intolerances        REVIEW OF SYSTEMS  Constitutional: No fever  Eyes: No blurry vision  Neuro: No tremors  HEENT: No pain  Cardiovascular: No chest pain, palpitations  Respiratory: No SOB, no cough  GI: No nausea, vomiting, abdominal pain  : No dysuria  Skin: no rash  Psych: no depression  Endocrine: no polyuria, polydipsia  Hem/lymph: no swelling  Osteoporosis: no fractures  ALL OTHER SYSTEMS REVIEWED AND NEGATIVE     UNABLE TO OBTAIN     PHYSICAL EXAM   Vital Signs Last 24 Hrs  T(C): 36.8 (25 Dec 2022 13:36), Max: 37.3 (24 Dec 2022 22:15)  T(F): 98.2 (25 Dec 2022 13:36), Max: 99.1 (24 Dec 2022 22:15)  HR: 64 (25 Dec 2022 13:36) (63 - 80)  BP: 188/75 (25 Dec 2022 13:36) (150/70 - 190/80)  BP(mean): 106 (25 Dec 2022 05:15) (106 - 106)  RR: 20 (25 Dec 2022 13:36) (18 - 23)  SpO2: 100% (25 Dec 2022 13:36) (100% - 100%)    Parameters below as of 25 Dec 2022 13:36  Patient On (Oxygen Delivery Method): nasal cannula, high flow  O2 Flow (L/min): 20  O2 Concentration (%): 45  GENERAL: NAD, well-groomed, well-developed  EYES: No proptosis, no lid lag, anicteric  HEENT:  Atraumatic, Normocephalic, moist mucous membranes  THYROID: Normal size, no palpable nodules  RESPIRATORY: Clear to auscultation bilaterally; No rales, rhonchi, wheezing  CARDIOVASCULAR: Regular rate and rhythm; No murmurs; no peripheral edema  GI: Soft, nontender, non distended, normal bowel sounds  SKIN: Dry, intact, No rashes or lesions  MUSCULOSKELETAL: Full range of motion, normal strength  NEURO: sensation intact, extraocular movements intact, no tremor  PSYCH: Alert and oriented x 3, normal affect, normal mood  CUSHING'S SIGNS: no striae    CAPILLARY BLOOD GLUCOSE      POCT Blood Glucose.: 273 mg/dL (25 Dec 2022 12:46)  POCT Blood Glucose.: 262 mg/dL (25 Dec 2022 09:04)  POCT Blood Glucose.: 241 mg/dL (25 Dec 2022 08:25)  POCT Blood Glucose.: 202 mg/dL (24 Dec 2022 21:31)  POCT Blood Glucose.: 265 mg/dL (24 Dec 2022 17:18)      A1C with Estimated Average Glucose Result: 6.5 % (12-22-22 @ 06:16)                            11.4   8.23  )-----------( 204      ( 25 Dec 2022 07:00 )             34.1       12-25    138  |  102  |  21  ----------------------------<  252<H>  3.7   |  28  |  0.79    Ca    9.0      25 Dec 2022 07:00  Phos  3.1     12-25  Mg     2.00     12-25    TPro  6.5  /  Alb  3.0<L>  /  TBili  0.3  /  DBili  x   /  AST  14  /  ALT  16  /  AlkPhos  103  12-25          LIPIDS    RADIOLOGY ENDOCRINE INITIAL CONSULT - diabetes    HPI:  78 y/o female with PMHx HTN, Hypothyroidism, DM initially presented to [facility] with complaints of SOB/ dyspnea starting since this morning. Pt reports that her sx started on sunday with rhinorrhea and intermittent cough. She has a known exposure to COVID at work and went to  on sunday and was tested COVID+ on sunday. Pt given paxlovid, but symptoms persisted worsened and she began experiencing SOB/ CARVAJAL prompting her to go to [facility] ED for evaluation. At [facility] pt with worsening dyspnea requiring BiPAP. Pt given Levaquin and lasix x1. CTA PE done which was negative but showed large mediastinal mass with mass effect on trachea and esophagus. Pt transferred to [facility] for thoracic surgery evaluation.    Currently, pt reports that she is feeling better; on 6L NC. Denies any fevers, chills, HA, CP, n/v/d/c, abdominal pain, or muscle aches. Reports she has hx of CVI and has lower extremity swelling that improves at night time.    (21 Dec 2022 21:44)      ENDOCRINE HISTORY   Diagnosed with DM: 2, 20 years  Last HbA1c: 6.5  Endocrinologist: managed by pcp  Home DM Meds:levemir 46 units twice daily, metformin 1000mg bid  Adherence: endorses compliance  Microvascular complications: endorses neuropathy, retinopathy, denies nephropathy  Macrovascular complications: denies MI or CVA  SMBG: twice per week  Symptoms: endorses polyuria, polydipsia, neuropathy  Hypoglycemia episodes: denies  BG at home: 90s-100s  Diet at home: breakfast: oatmeal, flakes, grits, coffee, lunch" [employer]" where she works, dinner: pasta, beans, crackers for snacks, has regular juice and soda  Appetite in hospital: ok  Exercise: walks  PMHx: as above  PSHx: as above  Family hx: sister, mother and aunt with diabetes, denies thyroid conditions  Social hx: denies tobacco use, alcohol use or recreational drug use  Insurance: [employer] Medicare  Resides in: [place]    She also reports a history of a large goiter, weight loss without exophthalmos 25 years ago when she had a partial thyroidectomy (unsure what side). She takes levothyroxine 50mcg daily without food every morning. She denies palpitations, tremors, neck pain, dysphagia, dysphonia. She endorses cold and heat sensitivity.    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)      Home Medications:  atorvastatin 40 mg oral tablet: 1 tab(s) orally once a day (21 Dec 2022 23:56)  CARVEDILOL 25 MG TABLET: TAKE 1 TABLET BY MOUTH TWICE A DAY (21 Dec 2022 23:56)  latanoprost 0.005% ophthalmic solution: to each affected eye once a day (at bedtime) (21 Dec 2022 23:56)  Levemir FlexPen 100 units/mL subcutaneous solution:  (21 Dec 2022 23:56)  levothyroxine 50 mcg (0.05 mg) oral tablet: orally every other day  (21 Dec 2022 23:56)  metFORMIN 1000 mg oral tablet: 1 tab(s) orally 2 times a day (21 Dec 2022 23:56)  NIFEdipine: 60 milligram(s) orally 2 times a day (21 Dec 2022 23:56)  telmisartan-hydrochlorothiazide 80 mg-12.5 mg oral tablet: 1 tab(s) orally once a day (21 Dec 2022 23:56)      MEDICATIONS  (STANDING):  albuterol    90 MICROgram(s) HFA Inhaler 2 Puff(s) Inhalation every 3 hours  atorvastatin 40 milliGRAM(s) Oral at bedtime  azithromycin  IVPB      azithromycin  IVPB 500 milliGRAM(s) IV Intermittent every 24 hours  carvedilol 25 milliGRAM(s) Oral every 12 hours  cefTRIAXone   IVPB 1000 milliGRAM(s) IV Intermittent every 24 hours  dexAMETHasone  Injectable 6 milliGRAM(s) IV Push daily  dextrose 5%. 1000 milliLiter(s) (100 mL/Hr) IV Continuous <Continuous>  dextrose 5%. 1000 milliLiter(s) (50 mL/Hr) IV Continuous <Continuous>  dextrose 50% Injectable 25 Gram(s) IV Push once  dextrose 50% Injectable 12.5 Gram(s) IV Push once  dextrose 50% Injectable 25 Gram(s) IV Push once  enoxaparin Injectable 40 milliGRAM(s) SubCutaneous every 24 hours  glucagon  Injectable 1 milliGRAM(s) IntraMuscular once  insulin glargine Injectable (LANTUS) 48 Unit(s) SubCutaneous at bedtime  insulin lispro (ADMELOG) corrective regimen sliding scale   SubCutaneous three times a day before meals  insulin lispro (ADMELOG) corrective regimen sliding scale   SubCutaneous at bedtime  insulin lispro Injectable (ADMELOG) 7 Unit(s) SubCutaneous three times a day before meals  latanoprost 0.005% Ophthalmic Solution 1 Drop(s) Both EYES at bedtime  levothyroxine 50 MICROGram(s) Oral daily  losartan 100 milliGRAM(s) Oral daily  NIFEdipine XL 60 milliGRAM(s) Oral two times a day  pantoprazole    Tablet 40 milliGRAM(s) Oral before breakfast  remdesivir  IVPB 100 milliGRAM(s) IV Intermittent every 24 hours  remdesivir  IVPB   IV Intermittent     MEDICATIONS  (PRN):  acetaminophen     Tablet .. 650 milliGRAM(s) Oral every 6 hours PRN Temp greater or equal to 38C (100.4F), Mild Pain (1 - 3)  albuterol/ipratropium for Nebulization 3 milliLiter(s) Nebulizer every 6 hours PRN Shortness of Breath and/or Wheezing  dextrose Oral Gel 15 Gram(s) Oral once PRN Blood Glucose LESS THAN 70 milliGRAM(s)/deciliter  melatonin 3 milliGRAM(s) Oral at bedtime PRN Insomnia      Allergies    No Known Allergies    Intolerances        REVIEW OF SYSTEMS  Constitutional: No fever, chills  Eyes: No blurry vision  Neuro: No tremors, endorses neuropathy  HEENT: No pain  Cardiovascular: No chest pain, palpitations  Respiratory: Endorses SOB, no cough  GI: Endorses nausea, and loose stools, denies vomiting, abdominal pain  : No dysuria  Skin: no rash  Psych: no depression  Endocrine: endorses polyuria, polydipsia  Hem/lymph: endorses swelling  Osteoporosis: no fractures  ALL OTHER SYSTEMS REVIEWED AND NEGATIVE     PHYSICAL EXAM   Vital Signs Last 24 Hrs  T(C): 36.8 (25 Dec 2022 13:36), Max: 37.3 (24 Dec 2022 22:15)  T(F): 98.2 (25 Dec 2022 13:36), Max: 99.1 (24 Dec 2022 22:15)  HR: 64 (25 Dec 2022 13:36) (63 - 80)  BP: 188/75 (25 Dec 2022 13:36) (150/70 - 190/80)  BP(mean): 106 (25 Dec 2022 05:15) (106 - 106)  RR: 20 (25 Dec 2022 13:36) (18 - 23)  SpO2: 100% (25 Dec 2022 13:36) (100% - 100%)    Parameters below as of 25 Dec 2022 13:36  Patient On (Oxygen Delivery Method): nasal cannula, high flow  O2 Flow (L/min): 20  O2 Concentration (%): 45  GENERAL: NAD, sitting on commode, looks stated age  EYES: No proptosis, anicteric  HEENT:  Atraumatic, Normocephalic, moist mucous membranes  THYROID: Enlarged thyroid with midline chest wall surgical scar  RESPIRATORY: Non-paradoxical breathing on HFNC; mild use of accessory muscles  CARDIOVASCULAR: Did not appear cyanotic, mild LE edema  GI: Soft, nontender, non distended  SKIN: Dry, intact, midline chest wall surgical scar  MUSCULOSKELETAL: Full range of motion, moving extremities spontaneously  NEURO: sensation intact on plantar surface of feet, no tremor  PSYCH: Answering questions appropriately, normal affect, normal mood  CUSHING'S SIGNS: no striae, no acanthosis, no dorsocervical fatpad    CAPILLARY BLOOD GLUCOSE      POCT Blood Glucose.: 273 mg/dL (25 Dec 2022 12:46)  POCT Blood Glucose.: 262 mg/dL (25 Dec 2022 09:04)  POCT Blood Glucose.: 241 mg/dL (25 Dec 2022 08:25)  POCT Blood Glucose.: 202 mg/dL (24 Dec 2022 21:31)  POCT Blood Glucose.: 265 mg/dL (24 Dec 2022 17:18)      A1C with Estimated Average Glucose Result: 6.5 % (12-22-22 @ 06:16)                            11.4   8.23  )-----------( 204      ( 25 Dec 2022 07:00 )             34.1       12-25    138  |  102  |  21  ----------------------------<  252<H>  3.7   |  28  |  0.79    Ca    9.0      25 Dec 2022 07:00  Phos  3.1     12-25  Mg     2.00     12-25    TPro  6.5  /  Alb  3.0<L>  /  TBili  0.3  /  DBili  x   /  AST  14  /  ALT  16  /  AlkPhos  103  12-25          LIPIDS    RADIOLOGY

## 2022-12-25 NOTE — PROGRESS NOTE ADULT - PROBLEM SELECTOR PLAN 2
CTA chest with BL patchy airspace opacities, predominantly in the lower lobes, R>L c/w multifocal PNA and small L pleural effusion  likely in setting of known COVID with superimposed bacterial PNA  - procal 1.49  - s/p vanc/zosyn  - c/w CTX/azithromycin x7d total course  - BCx NGTD   - f/u sputum cx, urine legionella

## 2022-12-26 LAB
ALBUMIN SERPL ELPH-MCNC: 2.9 G/DL — LOW (ref 3.3–5)
ALDOST SERPL-MCNC: 4.2 NG/DL — SIGNIFICANT CHANGE UP
ALP SERPL-CCNC: 99 U/L — SIGNIFICANT CHANGE UP (ref 40–120)
ALT FLD-CCNC: 16 U/L — SIGNIFICANT CHANGE UP (ref 4–33)
ANION GAP SERPL CALC-SCNC: 10 MMOL/L — SIGNIFICANT CHANGE UP (ref 7–14)
AST SERPL-CCNC: 9 U/L — SIGNIFICANT CHANGE UP (ref 4–32)
BILIRUB SERPL-MCNC: 0.2 MG/DL — SIGNIFICANT CHANGE UP (ref 0.2–1.2)
BUN SERPL-MCNC: 18 MG/DL — SIGNIFICANT CHANGE UP (ref 7–23)
CALCIUM SERPL-MCNC: 8.5 MG/DL — SIGNIFICANT CHANGE UP (ref 8.4–10.5)
CHLORIDE SERPL-SCNC: 104 MMOL/L — SIGNIFICANT CHANGE UP (ref 98–107)
CO2 SERPL-SCNC: 27 MMOL/L — SIGNIFICANT CHANGE UP (ref 22–31)
CREAT SERPL-MCNC: 0.84 MG/DL — SIGNIFICANT CHANGE UP (ref 0.5–1.3)
CULTURE RESULTS: SIGNIFICANT CHANGE UP
CULTURE RESULTS: SIGNIFICANT CHANGE UP
EGFR: 72 ML/MIN/1.73M2 — SIGNIFICANT CHANGE UP
GLUCOSE BLDC GLUCOMTR-MCNC: 214 MG/DL — HIGH (ref 70–99)
GLUCOSE BLDC GLUCOMTR-MCNC: 235 MG/DL — HIGH (ref 70–99)
GLUCOSE BLDC GLUCOMTR-MCNC: 241 MG/DL — HIGH (ref 70–99)
GLUCOSE BLDC GLUCOMTR-MCNC: 241 MG/DL — HIGH (ref 70–99)
GLUCOSE SERPL-MCNC: 251 MG/DL — HIGH (ref 70–99)
HCT VFR BLD CALC: 34.2 % — LOW (ref 34.5–45)
HGB BLD-MCNC: 11.4 G/DL — LOW (ref 11.5–15.5)
MAGNESIUM SERPL-MCNC: 1.9 MG/DL — SIGNIFICANT CHANGE UP (ref 1.6–2.6)
MCHC RBC-ENTMCNC: 28.5 PG — SIGNIFICANT CHANGE UP (ref 27–34)
MCHC RBC-ENTMCNC: 33.3 GM/DL — SIGNIFICANT CHANGE UP (ref 32–36)
MCV RBC AUTO: 85.5 FL — SIGNIFICANT CHANGE UP (ref 80–100)
NRBC # BLD: 0 /100 WBCS — SIGNIFICANT CHANGE UP (ref 0–0)
NRBC # FLD: 0 K/UL — SIGNIFICANT CHANGE UP (ref 0–0)
PHOSPHATE SERPL-MCNC: 2.9 MG/DL — SIGNIFICANT CHANGE UP (ref 2.5–4.5)
PLATELET # BLD AUTO: 198 K/UL — SIGNIFICANT CHANGE UP (ref 150–400)
POTASSIUM SERPL-MCNC: 3.2 MMOL/L — LOW (ref 3.5–5.3)
POTASSIUM SERPL-SCNC: 3.2 MMOL/L — LOW (ref 3.5–5.3)
PROT SERPL-MCNC: 6.2 G/DL — SIGNIFICANT CHANGE UP (ref 6–8.3)
RBC # BLD: 4 M/UL — SIGNIFICANT CHANGE UP (ref 3.8–5.2)
RBC # FLD: 12.6 % — SIGNIFICANT CHANGE UP (ref 10.3–14.5)
SODIUM SERPL-SCNC: 141 MMOL/L — SIGNIFICANT CHANGE UP (ref 135–145)
SPECIMEN SOURCE: SIGNIFICANT CHANGE UP
SPECIMEN SOURCE: SIGNIFICANT CHANGE UP
T4 FREE SERPL-MCNC: 1.4 NG/DL — SIGNIFICANT CHANGE UP (ref 0.9–1.8)
TSH SERPL-MCNC: 0.66 UIU/ML — SIGNIFICANT CHANGE UP (ref 0.27–4.2)
WBC # BLD: 7.5 K/UL — SIGNIFICANT CHANGE UP (ref 3.8–10.5)
WBC # FLD AUTO: 7.5 K/UL — SIGNIFICANT CHANGE UP (ref 3.8–10.5)

## 2022-12-26 PROCEDURE — 99233 SBSQ HOSP IP/OBS HIGH 50: CPT

## 2022-12-26 RX ORDER — INSULIN LISPRO 100/ML
16 VIAL (ML) SUBCUTANEOUS
Refills: 0 | Status: DISCONTINUED | OUTPATIENT
Start: 2022-12-26 | End: 2022-12-27

## 2022-12-26 RX ORDER — POTASSIUM CHLORIDE 20 MEQ
20 PACKET (EA) ORAL
Refills: 0 | Status: COMPLETED | OUTPATIENT
Start: 2022-12-26 | End: 2022-12-26

## 2022-12-26 RX ORDER — INSULIN GLARGINE 100 [IU]/ML
54 INJECTION, SOLUTION SUBCUTANEOUS AT BEDTIME
Refills: 0 | Status: DISCONTINUED | OUTPATIENT
Start: 2022-12-26 | End: 2022-12-28

## 2022-12-26 RX ADMIN — LOSARTAN POTASSIUM 100 MILLIGRAM(S): 100 TABLET, FILM COATED ORAL at 05:03

## 2022-12-26 RX ADMIN — CEFTRIAXONE 100 MILLIGRAM(S): 500 INJECTION, POWDER, FOR SOLUTION INTRAMUSCULAR; INTRAVENOUS at 11:13

## 2022-12-26 RX ADMIN — Medication 4: at 12:55

## 2022-12-26 RX ADMIN — AZITHROMYCIN 255 MILLIGRAM(S): 500 TABLET, FILM COATED ORAL at 12:53

## 2022-12-26 RX ADMIN — CARVEDILOL PHOSPHATE 25 MILLIGRAM(S): 80 CAPSULE, EXTENDED RELEASE ORAL at 05:03

## 2022-12-26 RX ADMIN — Medication 4: at 17:53

## 2022-12-26 RX ADMIN — LATANOPROST 1 DROP(S): 0.05 SOLUTION/ DROPS OPHTHALMIC; TOPICAL at 22:59

## 2022-12-26 RX ADMIN — Medication 15 UNIT(S): at 09:09

## 2022-12-26 RX ADMIN — Medication 16 UNIT(S): at 17:53

## 2022-12-26 RX ADMIN — ENOXAPARIN SODIUM 40 MILLIGRAM(S): 100 INJECTION SUBCUTANEOUS at 12:53

## 2022-12-26 RX ADMIN — Medication 6 MILLIGRAM(S): at 05:03

## 2022-12-26 RX ADMIN — ATORVASTATIN CALCIUM 40 MILLIGRAM(S): 80 TABLET, FILM COATED ORAL at 22:59

## 2022-12-26 RX ADMIN — Medication 20 MILLIEQUIVALENT(S): at 12:53

## 2022-12-26 RX ADMIN — Medication 4: at 09:08

## 2022-12-26 RX ADMIN — Medication 20 MILLIEQUIVALENT(S): at 11:13

## 2022-12-26 RX ADMIN — Medication 15 UNIT(S): at 12:55

## 2022-12-26 RX ADMIN — INSULIN GLARGINE 54 UNIT(S): 100 INJECTION, SOLUTION SUBCUTANEOUS at 23:00

## 2022-12-26 RX ADMIN — REMDESIVIR 500 MILLIGRAM(S): 5 INJECTION INTRAVENOUS at 05:48

## 2022-12-26 RX ADMIN — Medication 60 MILLIGRAM(S): at 23:00

## 2022-12-26 RX ADMIN — PANTOPRAZOLE SODIUM 40 MILLIGRAM(S): 20 TABLET, DELAYED RELEASE ORAL at 09:10

## 2022-12-26 RX ADMIN — Medication 50 MICROGRAM(S): at 05:03

## 2022-12-26 RX ADMIN — Medication 60 MILLIGRAM(S): at 11:16

## 2022-12-26 RX ADMIN — CARVEDILOL PHOSPHATE 25 MILLIGRAM(S): 80 CAPSULE, EXTENDED RELEASE ORAL at 17:56

## 2022-12-26 NOTE — PROGRESS NOTE ADULT - PROBLEM SELECTOR PLAN 2
CTA chest with BL patchy airspace opacities, predominantly in the lower lobes, R>L c/w multifocal PNA and small L pleural effusion  likely in setting of known COVID with superimposed bacterial PNA  - procal 1.49  - s/p vanc/zosyn  - c/w CTX/azithromycin x7d total course  - BCx NGTD   - f/u sputum cx, urine legionella CTA chest with BL patchy airspace opacities, predominantly in the lower lobes, R>L c/w multifocal PNA and small L pleural effusion  likely in setting of known COVID with superimposed bacterial PNA  - procal 1.49  - s/p vanc/zosyn  - sp 5 day course of CTX/azithromycin  - BCx NGTD   - f/u sputum cx, urine legionella

## 2022-12-26 NOTE — CHART NOTE - NSCHARTNOTEFT_GEN_A_CORE
Patient with steroid-induced hyperglycemia.    Based on BG trends, recommend increasing Lantus to 54 units qhs (DO NOT HOLD IF NPO) and Admelog to 16 units TIDac (HOLD IF NPO). Use moderate dose Admelog correction scale qac and separate moderate dose Admelog correction scale qhs.    Orders placed.    Erwin Cuevas DO, Endocrinology Fellow  For follow-up questions, discharge recommendations, or new consults please call answering service at [phone] (weekdays), [phone] (nights/weekends). For nonurgent matters, please email [email or web address] or [email or web address].

## 2022-12-26 NOTE — PROGRESS NOTE ADULT - ASSESSMENT
Pt is a 76 yo F with PMH HTN, HLD, T2D, and partial thyroidectomy p/t OSH with worsening weakness, SOB, hypoxia, and known COVID+ (urgent care 12/18). Found to have COVID with superimposed multifocal PNA and mediastinal mass. Transferred to [facility] for CT Sx evaluation.

## 2022-12-27 LAB
ALBUMIN SERPL ELPH-MCNC: 3 G/DL — LOW (ref 3.3–5)
ALP SERPL-CCNC: 97 U/L — SIGNIFICANT CHANGE UP (ref 40–120)
ALT FLD-CCNC: 14 U/L — SIGNIFICANT CHANGE UP (ref 4–33)
ANION GAP SERPL CALC-SCNC: 11 MMOL/L — SIGNIFICANT CHANGE UP (ref 7–14)
AST SERPL-CCNC: 13 U/L — SIGNIFICANT CHANGE UP (ref 4–32)
BILIRUB SERPL-MCNC: 0.2 MG/DL — SIGNIFICANT CHANGE UP (ref 0.2–1.2)
BUN SERPL-MCNC: 18 MG/DL — SIGNIFICANT CHANGE UP (ref 7–23)
CALCIUM SERPL-MCNC: 8.8 MG/DL — SIGNIFICANT CHANGE UP (ref 8.4–10.5)
CHLORIDE SERPL-SCNC: 105 MMOL/L — SIGNIFICANT CHANGE UP (ref 98–107)
CO2 SERPL-SCNC: 25 MMOL/L — SIGNIFICANT CHANGE UP (ref 22–31)
CREAT SERPL-MCNC: 0.87 MG/DL — SIGNIFICANT CHANGE UP (ref 0.5–1.3)
EGFR: 69 ML/MIN/1.73M2 — SIGNIFICANT CHANGE UP
GLUCOSE BLDC GLUCOMTR-MCNC: 158 MG/DL — HIGH (ref 70–99)
GLUCOSE BLDC GLUCOMTR-MCNC: 184 MG/DL — HIGH (ref 70–99)
GLUCOSE BLDC GLUCOMTR-MCNC: 186 MG/DL — HIGH (ref 70–99)
GLUCOSE BLDC GLUCOMTR-MCNC: 205 MG/DL — HIGH (ref 70–99)
GLUCOSE SERPL-MCNC: 162 MG/DL — HIGH (ref 70–99)
HCT VFR BLD CALC: 34.6 % — SIGNIFICANT CHANGE UP (ref 34.5–45)
HGB BLD-MCNC: 11.4 G/DL — LOW (ref 11.5–15.5)
MAGNESIUM SERPL-MCNC: 1.8 MG/DL — SIGNIFICANT CHANGE UP (ref 1.6–2.6)
MCHC RBC-ENTMCNC: 28.5 PG — SIGNIFICANT CHANGE UP (ref 27–34)
MCHC RBC-ENTMCNC: 32.9 GM/DL — SIGNIFICANT CHANGE UP (ref 32–36)
MCV RBC AUTO: 86.5 FL — SIGNIFICANT CHANGE UP (ref 80–100)
NRBC # BLD: 0 /100 WBCS — SIGNIFICANT CHANGE UP (ref 0–0)
NRBC # FLD: 0 K/UL — SIGNIFICANT CHANGE UP (ref 0–0)
PHOSPHATE SERPL-MCNC: 2.9 MG/DL — SIGNIFICANT CHANGE UP (ref 2.5–4.5)
PLATELET # BLD AUTO: 191 K/UL — SIGNIFICANT CHANGE UP (ref 150–400)
POTASSIUM SERPL-MCNC: 3.3 MMOL/L — LOW (ref 3.5–5.3)
POTASSIUM SERPL-SCNC: 3.3 MMOL/L — LOW (ref 3.5–5.3)
PROT SERPL-MCNC: 6 G/DL — SIGNIFICANT CHANGE UP (ref 6–8.3)
RBC # BLD: 4 M/UL — SIGNIFICANT CHANGE UP (ref 3.8–5.2)
RBC # FLD: 12.9 % — SIGNIFICANT CHANGE UP (ref 10.3–14.5)
SODIUM SERPL-SCNC: 141 MMOL/L — SIGNIFICANT CHANGE UP (ref 135–145)
WBC # BLD: 8.17 K/UL — SIGNIFICANT CHANGE UP (ref 3.8–10.5)
WBC # FLD AUTO: 8.17 K/UL — SIGNIFICANT CHANGE UP (ref 3.8–10.5)

## 2022-12-27 PROCEDURE — 71275 CT ANGIOGRAPHY CHEST: CPT | Mod: 26

## 2022-12-27 PROCEDURE — 99233 SBSQ HOSP IP/OBS HIGH 50: CPT

## 2022-12-27 PROCEDURE — 99232 SBSQ HOSP IP/OBS MODERATE 35: CPT

## 2022-12-27 RX ORDER — INSULIN LISPRO 100/ML
18 VIAL (ML) SUBCUTANEOUS
Refills: 0 | Status: DISCONTINUED | OUTPATIENT
Start: 2022-12-27 | End: 2022-12-28

## 2022-12-27 RX ORDER — POTASSIUM CHLORIDE 20 MEQ
40 PACKET (EA) ORAL ONCE
Refills: 0 | Status: COMPLETED | OUTPATIENT
Start: 2022-12-27 | End: 2022-12-27

## 2022-12-27 RX ADMIN — LATANOPROST 1 DROP(S): 0.05 SOLUTION/ DROPS OPHTHALMIC; TOPICAL at 21:12

## 2022-12-27 RX ADMIN — Medication 16 UNIT(S): at 09:25

## 2022-12-27 RX ADMIN — Medication 2: at 18:28

## 2022-12-27 RX ADMIN — Medication 6 MILLIGRAM(S): at 05:12

## 2022-12-27 RX ADMIN — PANTOPRAZOLE SODIUM 40 MILLIGRAM(S): 20 TABLET, DELAYED RELEASE ORAL at 05:12

## 2022-12-27 RX ADMIN — LOSARTAN POTASSIUM 100 MILLIGRAM(S): 100 TABLET, FILM COATED ORAL at 05:13

## 2022-12-27 RX ADMIN — Medication 18 UNIT(S): at 18:28

## 2022-12-27 RX ADMIN — Medication 4: at 13:40

## 2022-12-27 RX ADMIN — ATORVASTATIN CALCIUM 40 MILLIGRAM(S): 80 TABLET, FILM COATED ORAL at 21:12

## 2022-12-27 RX ADMIN — Medication 16 UNIT(S): at 13:40

## 2022-12-27 RX ADMIN — ENOXAPARIN SODIUM 40 MILLIGRAM(S): 100 INJECTION SUBCUTANEOUS at 13:40

## 2022-12-27 RX ADMIN — CARVEDILOL PHOSPHATE 25 MILLIGRAM(S): 80 CAPSULE, EXTENDED RELEASE ORAL at 17:30

## 2022-12-27 RX ADMIN — Medication 40 MILLIEQUIVALENT(S): at 11:14

## 2022-12-27 RX ADMIN — INSULIN GLARGINE 54 UNIT(S): 100 INJECTION, SOLUTION SUBCUTANEOUS at 22:31

## 2022-12-27 RX ADMIN — Medication 60 MILLIGRAM(S): at 13:40

## 2022-12-27 RX ADMIN — Medication 60 MILLIGRAM(S): at 22:53

## 2022-12-27 RX ADMIN — Medication 3 MILLIGRAM(S): at 21:12

## 2022-12-27 RX ADMIN — CARVEDILOL PHOSPHATE 25 MILLIGRAM(S): 80 CAPSULE, EXTENDED RELEASE ORAL at 05:13

## 2022-12-27 RX ADMIN — Medication 50 MICROGRAM(S): at 05:12

## 2022-12-27 RX ADMIN — Medication 2: at 09:24

## 2022-12-27 NOTE — PROGRESS NOTE ADULT - ASSESSMENT
76 y/o female with PMHx HTN, Hypothyroidism, DM initially presented to [facility] with complaints of SOB/ dyspnea starting since this morning. Pt reports that her sx started on sunday with rhinorrhea and intermittent cough found to have COVID-19 infection. Endocrinology consulted for management of diabetes and steroid hyperglycemia.    Poorly controlled T2DM with hyperglycemia  Steroid Hyperglycemia  - HbA1c: 6.5  - Home Regimen: levemir 46 units bid, metformin 1000mg bid  - Endocrinologist: does not have  - currently on dexamethasone 6mg daily for COVID; please notify Endocrine if any change in Steroid dosing as this may affect insulin dosing  PLAN  - Hold oral DM agents while inpatient  - can change IV medication to non dextrose containing formulation  - Continue Lantus 54 units at bedtime. DO NOT HOLD IF NPO.  - Increase Admelog to 18 units TID pre-meal. HOLD IF NPO.  - Use moderate dose Admelog correction scale pre-meal  - Use moderate dose Admelog correction scale at bedtime  - Fingerstick BG before meals and bedtime  - Goal -180: above goal   - Carbohydrate consistent diet  Discharge plan:  - Discharge medications: Basal/ bolus doses TBD and Metformin 1 g BID with meals  - Patient will need education on how to self-administer insulin, how to check FSBG, as well as hypoglycemia management. Patient to call doctor with persistent high or low BG at home.   - Ensure patient has glucometer, test strips and lancets on discharge.  - Recommend routine outpatient ophthalmology, podiatry and endocrinology f/u  -For now pt prefers to follow up with PCP for DM mgmt. If patient wishes she ma y follow up at [facility] [place] [address] [address]; [phone]    Hypothyroidism  Hx Goiter  Partial thyroidectomy  - patient reports hx of goiter with weight loss 25 years ago s/p partial thyroidectomy  - on levothyroxine 50mcg daily  - appears clinically euthyroid  PLAN  - continue levothyroxine 50mcg daily (maintain on empty stomach (at least 1 hour before meals), separate by 4 hours from PPI or calcium supplementation which can inhibit its absorption)  -  TSH 0.66 WNL and FT4 1.4 WNL     Left Adrenal Gland Nodule  - CT showed indeterminant left adrenal gland nodule  - patient is on high dose steroids with dexamethasone  - did not appear cushingoid on exam  - patient is hypertensive, K and Na within normal limits  PLAN  - can check am plasma metanephrines, serum aldosterone (4.2 WNL), and plasma renin activity  - defer cushings workup to outpatient given on dexamethasone    HTN  - Home regimen: carvedilol, nifedipine, telmisartan-hctz  PLAN  - Can check urine microalbumin outpatient  - Outpatient goal BP <130/80. Management per primary team.  - above adrenal gland nodule    HLD  - Home regimen: atorvastatin 40mg daily  PLAN  - Continue atorvastatin 40mg daily  - Can check lipid profile if not done recently    Ana Lamas  Nurse Practitioner  Division of Endocrinology & Diabetes  In house pager #[phone]    If before 9AM or after 6PM, or on weekends/holidays, please call endocrine answering service for assistance ([phone]).For nonurgent matters email [email or web address] for assistance.

## 2022-12-27 NOTE — PROGRESS NOTE ADULT - ASSESSMENT
Pt is a 76 yo F with PMH HTN, HLD, T2D, and partial thyroidectomy p/t OSH with worsening weakness, SOB, hypoxia, and known COVID+ (urgent care 12/18). Found to have COVID with superimposed multifocal PNA and mediastinal mass. Transferred to [facility] for CT Sx evaluation.

## 2022-12-27 NOTE — PROGRESS NOTE ADULT - PROBLEM SELECTOR PLAN 2
CTA chest with BL patchy airspace opacities, predominantly in the lower lobes, R>L c/w multifocal PNA and small L pleural effusion  likely in setting of known COVID with superimposed bacterial PNA  - procal 1.49  - s/p CTX/azithromycin x7d total course  - BCx NGTD

## 2022-12-28 LAB
ANION GAP SERPL CALC-SCNC: 10 MMOL/L — SIGNIFICANT CHANGE UP (ref 7–14)
BUN SERPL-MCNC: 18 MG/DL — SIGNIFICANT CHANGE UP (ref 7–23)
CALCIUM SERPL-MCNC: 8.9 MG/DL — SIGNIFICANT CHANGE UP (ref 8.4–10.5)
CHLORIDE SERPL-SCNC: 104 MMOL/L — SIGNIFICANT CHANGE UP (ref 98–107)
CO2 SERPL-SCNC: 26 MMOL/L — SIGNIFICANT CHANGE UP (ref 22–31)
CREAT SERPL-MCNC: 0.85 MG/DL — SIGNIFICANT CHANGE UP (ref 0.5–1.3)
EGFR: 71 ML/MIN/1.73M2 — SIGNIFICANT CHANGE UP
GLUCOSE BLDC GLUCOMTR-MCNC: 205 MG/DL — HIGH (ref 70–99)
GLUCOSE BLDC GLUCOMTR-MCNC: 206 MG/DL — HIGH (ref 70–99)
GLUCOSE BLDC GLUCOMTR-MCNC: 208 MG/DL — HIGH (ref 70–99)
GLUCOSE BLDC GLUCOMTR-MCNC: 248 MG/DL — HIGH (ref 70–99)
GLUCOSE SERPL-MCNC: 162 MG/DL — HIGH (ref 70–99)
HCT VFR BLD CALC: 33.3 % — LOW (ref 34.5–45)
HGB BLD-MCNC: 11.1 G/DL — LOW (ref 11.5–15.5)
MAGNESIUM SERPL-MCNC: 1.8 MG/DL — SIGNIFICANT CHANGE UP (ref 1.6–2.6)
MCHC RBC-ENTMCNC: 28.7 PG — SIGNIFICANT CHANGE UP (ref 27–34)
MCHC RBC-ENTMCNC: 33.3 GM/DL — SIGNIFICANT CHANGE UP (ref 32–36)
MCV RBC AUTO: 86 FL — SIGNIFICANT CHANGE UP (ref 80–100)
NRBC # BLD: 0 /100 WBCS — SIGNIFICANT CHANGE UP (ref 0–0)
NRBC # FLD: 0 K/UL — SIGNIFICANT CHANGE UP (ref 0–0)
PHOSPHATE SERPL-MCNC: 4 MG/DL — SIGNIFICANT CHANGE UP (ref 2.5–4.5)
PLATELET # BLD AUTO: 195 K/UL — SIGNIFICANT CHANGE UP (ref 150–400)
POTASSIUM SERPL-MCNC: 3.4 MMOL/L — LOW (ref 3.5–5.3)
POTASSIUM SERPL-SCNC: 3.4 MMOL/L — LOW (ref 3.5–5.3)
RBC # BLD: 3.87 M/UL — SIGNIFICANT CHANGE UP (ref 3.8–5.2)
RBC # FLD: 12.9 % — SIGNIFICANT CHANGE UP (ref 10.3–14.5)
SODIUM SERPL-SCNC: 140 MMOL/L — SIGNIFICANT CHANGE UP (ref 135–145)
WBC # BLD: 6.06 K/UL — SIGNIFICANT CHANGE UP (ref 3.8–10.5)
WBC # FLD AUTO: 6.06 K/UL — SIGNIFICANT CHANGE UP (ref 3.8–10.5)

## 2022-12-28 PROCEDURE — 99233 SBSQ HOSP IP/OBS HIGH 50: CPT

## 2022-12-28 PROCEDURE — 99232 SBSQ HOSP IP/OBS MODERATE 35: CPT

## 2022-12-28 RX ORDER — INSULIN LISPRO 100/ML
20 VIAL (ML) SUBCUTANEOUS
Refills: 0 | Status: DISCONTINUED | OUTPATIENT
Start: 2022-12-28 | End: 2022-12-29

## 2022-12-28 RX ORDER — INSULIN GLARGINE 100 [IU]/ML
56 INJECTION, SOLUTION SUBCUTANEOUS AT BEDTIME
Refills: 0 | Status: DISCONTINUED | OUTPATIENT
Start: 2022-12-28 | End: 2022-12-29

## 2022-12-28 RX ORDER — POTASSIUM CHLORIDE 20 MEQ
40 PACKET (EA) ORAL ONCE
Refills: 0 | Status: COMPLETED | OUTPATIENT
Start: 2022-12-28 | End: 2022-12-28

## 2022-12-28 RX ADMIN — LATANOPROST 1 DROP(S): 0.05 SOLUTION/ DROPS OPHTHALMIC; TOPICAL at 22:07

## 2022-12-28 RX ADMIN — Medication 4: at 12:24

## 2022-12-28 RX ADMIN — Medication 6 MILLIGRAM(S): at 05:52

## 2022-12-28 RX ADMIN — Medication 60 MILLIGRAM(S): at 22:06

## 2022-12-28 RX ADMIN — Medication 4: at 17:39

## 2022-12-28 RX ADMIN — Medication 20 UNIT(S): at 17:40

## 2022-12-28 RX ADMIN — CARVEDILOL PHOSPHATE 25 MILLIGRAM(S): 80 CAPSULE, EXTENDED RELEASE ORAL at 05:53

## 2022-12-28 RX ADMIN — INSULIN GLARGINE 56 UNIT(S): 100 INJECTION, SOLUTION SUBCUTANEOUS at 22:04

## 2022-12-28 RX ADMIN — Medication 18 UNIT(S): at 12:25

## 2022-12-28 RX ADMIN — Medication 50 MICROGRAM(S): at 05:53

## 2022-12-28 RX ADMIN — Medication 40 MILLIEQUIVALENT(S): at 10:22

## 2022-12-28 RX ADMIN — Medication 4: at 08:32

## 2022-12-28 RX ADMIN — PANTOPRAZOLE SODIUM 40 MILLIGRAM(S): 20 TABLET, DELAYED RELEASE ORAL at 05:53

## 2022-12-28 RX ADMIN — Medication 18 UNIT(S): at 08:33

## 2022-12-28 RX ADMIN — LOSARTAN POTASSIUM 100 MILLIGRAM(S): 100 TABLET, FILM COATED ORAL at 05:53

## 2022-12-28 RX ADMIN — Medication 60 MILLIGRAM(S): at 12:24

## 2022-12-28 RX ADMIN — CARVEDILOL PHOSPHATE 25 MILLIGRAM(S): 80 CAPSULE, EXTENDED RELEASE ORAL at 17:29

## 2022-12-28 RX ADMIN — Medication 3 MILLIGRAM(S): at 22:14

## 2022-12-28 RX ADMIN — ATORVASTATIN CALCIUM 40 MILLIGRAM(S): 80 TABLET, FILM COATED ORAL at 22:05

## 2022-12-28 RX ADMIN — ENOXAPARIN SODIUM 40 MILLIGRAM(S): 100 INJECTION SUBCUTANEOUS at 12:25

## 2022-12-28 NOTE — DIETITIAN INITIAL EVALUATION ADULT - NS FNS DIET ORDER
Diet, DASH/TLC:   Sodium & Cholesterol Restricted  Consistent Carbohydrate {No Snacks} (CSTCHO) (12-21-22 @ 23:51)

## 2022-12-28 NOTE — DIETITIAN INITIAL EVALUATION ADULT - OTHER INFO
A&Ox4. Reports improving PO intake and increasing appetite 2/2 steroids. No reported GI issues (nausea/vomiting/diarrhea/constipation.) Denies any chewing or swallowing difficulties at this time. Some difficulties after swallowing 2/2 mediastinal mass. Pt unable to state UBW; she hasn't weighed herself in a while. Of note, dosing wt (60 kg) likely incorrect given physical appearance.  She is mostly concerned about her hyperglycemia.

## 2022-12-28 NOTE — PROGRESS NOTE ADULT - ASSESSMENT
78 y/o female with PMHx HTN, Hypothyroidism, DM initially presented to [facility] with complaints of SOB/ dyspnea starting since this morning. Pt reports that her sx started on sunday with rhinorrhea and intermittent cough found to have COVID-19 infection. Endocrinology consulted for management of diabetes and steroid hyperglycemia.    Poorly controlled T2DM with hyperglycemia  Steroid Hyperglycemia  - HbA1c: 6.5  - Home Regimen: levemir 46 units bid, metformin 1000mg bid  - Endocrinologist: does not have  - currently on dexamethasone 6mg daily for COVID; please notify Endocrine if any change in Steroid dosing as this may affect insulin dosing  PLAN  - Hold oral DM agents while inpatient  - can change IV medication to non dextrose containing formulation  - Increase Lantus to 56 units at bedtime. DO NOT HOLD IF NPO.  - Increase Admelog to 20 units TID pre-meal. HOLD IF NPO.  - Use moderate dose Admelog correction scale pre-meal  - Use moderate dose Admelog correction scale at bedtime  - Fingerstick BG before meals and bedtime  - Goal -180: above goal   - Carbohydrate consistent diet  Discharge plan:  - Discharge medications: Basal/ bolus doses TBD and Metformin 1 g BID with meals  - Patient will need education on how to self-administer insulin, how to check FSBG, as well as hypoglycemia management. Patient to call doctor with persistent high or low BG at home.   - Ensure patient has glucometer, test strips and lancets on discharge.  - Recommend routine outpatient ophthalmology, podiatry and endocrinology f/u  -For now pt prefers to follow up with PCP for DM mgmt. If patient wishes she ma y follow up at [facility] [place] [address] [address]; [phone]    Hypothyroidism  Hx Goiter  Partial thyroidectomy  - patient reports hx of goiter with weight loss 25 years ago s/p partial thyroidectomy  - on levothyroxine 50mcg daily  - appears clinically euthyroid  PLAN  - continue levothyroxine 50mcg daily (maintain on empty stomach (at least 1 hour before meals), separate by 4 hours from PPI or calcium supplementation which can inhibit its absorption)  -  TSH 0.66 WNL and FT4 1.4 WNL     Left Adrenal Gland Nodule  - CT showed indeterminant left adrenal gland nodule  - patient is on high dose steroids with dexamethasone  - did not appear cushingoid on exam  - patient is hypertensive, K and Na within normal limits  PLAN  - can check am plasma metanephrines, serum aldosterone (4.2 WNL), and plasma renin activity  - defer cushings workup to outpatient given on dexamethasone    HTN  - Home regimen: carvedilol, nifedipine, telmisartan-hctz  PLAN  - Can check urine microalbumin outpatient  - Outpatient goal BP <130/80. Management per primary team.  - above adrenal gland nodule    HLD  - Home regimen: atorvastatin 40mg daily  PLAN  - Continue atorvastatin 40mg daily  - Can check lipid profile if not done recently    Ana Lamas  Nurse Practitioner  Division of Endocrinology & Diabetes  In house pager #[phone]    If before 9AM or after 6PM, or on weekends/holidays, please call endocrine answering service for assistance ([phone]).For nonurgent matters email [email or web address] for assistance.    76 y/o female with PMHx HTN, Hypothyroidism, DM initially presented to [facility] with complaints of SOB/ dyspnea starting since this morning. Pt reports that her sx started on sunday with rhinorrhea and intermittent cough found to have COVID-19 infection. Endocrinology consulted for management of diabetes and steroid hyperglycemia.    Poorly controlled T2DM with hyperglycemia  Steroid Hyperglycemia  - HbA1c: 6.5  - Home Regimen: levemir 46 units bid, metformin 1000mg bid  - Endocrinologist: does not have  - currently on dexamethasone 6mg daily for COVID; please notify Endocrine if any change in Steroid dosing as this may affect insulin dosing  PLAN  - Hold oral DM agents while inpatient  - can change IV medication to non dextrose containing formulation  - Increase Lantus to 56 units at bedtime. DO NOT HOLD IF NPO.  - Increase Admelog to 20 units TID pre-meal. HOLD IF NPO.  - Use moderate dose Admelog correction scale pre-meal  - Use moderate dose Admelog correction scale at bedtime  - Fingerstick BG before meals and bedtime  - Goal -180: above goal ; pt states she didn't eat prior to am FS   - Carbohydrate consistent diet  Discharge plan:  - Discharge medications: Basal/ bolus doses TBD and Metformin 1 g BID with meals  - Patient will need education on how to self-administer insulin, how to check FSBG, as well as hypoglycemia management. Patient to call doctor with persistent high or low BG at home.   - Ensure patient has glucometer, test strips and lancets on discharge.  - Recommend routine outpatient ophthalmology, podiatry and endocrinology f/u  -For now pt prefers to follow up with PCP for DM mgmt. If patient wishes she ma y follow up at [facility] [place] [address] [address]; [phone]    Hypothyroidism  Hx Goiter  Partial thyroidectomy  - patient reports hx of goiter with weight loss 25 years ago s/p partial thyroidectomy  - on levothyroxine 50mcg daily  - appears clinically euthyroid  PLAN  - continue levothyroxine 50mcg daily (maintain on empty stomach (at least 1 hour before meals), separate by 4 hours from PPI or calcium supplementation which can inhibit its absorption)  -  TSH 0.66 WNL and FT4 1.4 WNL     Left Adrenal Gland Nodule  - CT showed indeterminant left adrenal gland nodule  - patient is on high dose steroids with dexamethasone  - did not appear cushingoid on exam  - patient is hypertensive, K and Na within normal limits  PLAN  - can check am plasma metanephrines, serum aldosterone (4.2 WNL), and plasma renin activity  - defer cushings workup to outpatient given on dexamethasone    HTN  - Home regimen: carvedilol, nifedipine, telmisartan-hctz  PLAN  - Can check urine microalbumin outpatient  - Outpatient goal BP <130/80. Management per primary team.  - above adrenal gland nodule    HLD  - Home regimen: atorvastatin 40mg daily  PLAN  - Continue atorvastatin 40mg daily  - Can check lipid profile if not done recently    Ana Lamas  Nurse Practitioner  Division of Endocrinology & Diabetes  In house pager #[phone]    If before 9AM or after 6PM, or on weekends/holidays, please call endocrine answering service for assistance ([phone]).For nonurgent matters email [email or web address] for assistance.

## 2022-12-28 NOTE — DIETITIAN INITIAL EVALUATION ADULT - PERTINENT LABORATORY DATA
12-28    140  |  104  |  18  ----------------------------<  162<H>  3.4<L>   |  26  |  0.85    Ca    8.9      28 Dec 2022 06:40  Phos  4.0     12-28  Mg     1.80     12-28    TPro  6.0  /  Alb  3.0<L>  /  TBili  0.2  /  DBili  x   /  AST  13  /  ALT  14  /  AlkPhos  97  12-27  POCT Blood Glucose.: 206 mg/dL (12-28-22 @ 08:27)  A1C with Estimated Average Glucose Result: 6.5 % (12-22-22 @ 06:16)

## 2022-12-28 NOTE — DIETITIAN INITIAL EVALUATION ADULT - PERTINENT MEDS FT
MEDICATIONS  (STANDING):  albuterol    90 MICROgram(s) HFA Inhaler 2 Puff(s) Inhalation every 3 hours  atorvastatin 40 milliGRAM(s) Oral at bedtime  carvedilol 25 milliGRAM(s) Oral every 12 hours  dexAMETHasone  Injectable 6 milliGRAM(s) IV Push daily  dextrose 5%. 1000 milliLiter(s) (100 mL/Hr) IV Continuous <Continuous>  dextrose 5%. 1000 milliLiter(s) (50 mL/Hr) IV Continuous <Continuous>  dextrose 50% Injectable 25 Gram(s) IV Push once  dextrose 50% Injectable 12.5 Gram(s) IV Push once  dextrose 50% Injectable 25 Gram(s) IV Push once  enoxaparin Injectable 40 milliGRAM(s) SubCutaneous every 24 hours  glucagon  Injectable 1 milliGRAM(s) IntraMuscular once  insulin glargine Injectable (LANTUS) 54 Unit(s) SubCutaneous at bedtime  insulin lispro (ADMELOG) corrective regimen sliding scale   SubCutaneous three times a day before meals  insulin lispro (ADMELOG) corrective regimen sliding scale   SubCutaneous at bedtime  insulin lispro Injectable (ADMELOG) 18 Unit(s) SubCutaneous three times a day before meals  latanoprost 0.005% Ophthalmic Solution 1 Drop(s) Both EYES at bedtime  levothyroxine 50 MICROGram(s) Oral daily  losartan 100 milliGRAM(s) Oral daily  NIFEdipine XL 60 milliGRAM(s) Oral two times a day  pantoprazole    Tablet 40 milliGRAM(s) Oral before breakfast  potassium chloride    Tablet ER 40 milliEquivalent(s) Oral once    MEDICATIONS  (PRN):  acetaminophen     Tablet .. 650 milliGRAM(s) Oral every 6 hours PRN Temp greater or equal to 38C (100.4F), Mild Pain (1 - 3)  albuterol/ipratropium for Nebulization 3 milliLiter(s) Nebulizer every 6 hours PRN Shortness of Breath and/or Wheezing  dextrose Oral Gel 15 Gram(s) Oral once PRN Blood Glucose LESS THAN 70 milliGRAM(s)/deciliter  melatonin 3 milliGRAM(s) Oral at bedtime PRN Insomnia

## 2022-12-28 NOTE — DIETITIAN INITIAL EVALUATION ADULT - PERSON TAUGHT/METHOD
-discussed optimal FS range, importance of SMBG, and side effects of steroids.   - discussed importance of balancing carb portions with fiber and protein.   --balanced meal/snack ideas discussed./verbal instruction/patient instructed

## 2022-12-28 NOTE — DIETITIAN INITIAL EVALUATION ADULT - REASON FOR ADMISSION
Infection due to severe acute respiratory syndrome coronavirus 2 (SARS-CoV-2)  Pt is a 78 yo F with PMH HTN, HLD, T2D, and partial thyroidectomy p/t OSH with worsening weakness, SOB, hypoxia, and known COVID+ (urgent care 12/18). Found to have COVID with superimposed multifocal PNA and mediastinal mass. Transferred to [facility] for CT Sx evaluation.

## 2022-12-28 NOTE — CHART NOTE - NSCHARTNOTEFT_GEN_A_CORE
CT scan reviewed by Dr. Fisher  As per primary team, pt starting to recover from acute  COVID infection.   No urgent surgical intervention indicated.   Pt should fully recover from COVID then return  to Thoracic office to see Dr. Barnhart in about  2-3 weeks to review scan, discuss further w/u  and any possible surgical plans  Call to make an apt. at [phone]  Above d/w [facility] ACP.  Will sign off at this time.   Re-call with any questions or concerns

## 2022-12-28 NOTE — PROGRESS NOTE ADULT - ASSESSMENT
Pt is a 78 yo F with PMH HTN, HLD, T2D, and partial thyroidectomy p/t OSH with worsening weakness, SOB, hypoxia, and known COVID+ (urgent care 12/18). Found to have COVID with superimposed multifocal PNA and mediastinal mass. Transferred to [facility] for CT Sx evaluation.

## 2022-12-28 NOTE — DIETITIAN INITIAL EVALUATION ADULT - ADD RECOMMEND
1. Encourage PO intake and honor food preferences as able.   2. Continue to document PO in RN flow sheets and monitor weekly weights.

## 2022-12-29 ENCOUNTER — TRANSCRIPTION ENCOUNTER (OUTPATIENT)
Age: 77
End: 2022-12-29

## 2022-12-29 LAB
ANION GAP SERPL CALC-SCNC: 12 MMOL/L — SIGNIFICANT CHANGE UP (ref 7–14)
BUN SERPL-MCNC: 19 MG/DL — SIGNIFICANT CHANGE UP (ref 7–23)
CALCIUM SERPL-MCNC: 8.5 MG/DL — SIGNIFICANT CHANGE UP (ref 8.4–10.5)
CHLORIDE SERPL-SCNC: 104 MMOL/L — SIGNIFICANT CHANGE UP (ref 98–107)
CO2 SERPL-SCNC: 25 MMOL/L — SIGNIFICANT CHANGE UP (ref 22–31)
CREAT SERPL-MCNC: 0.89 MG/DL — SIGNIFICANT CHANGE UP (ref 0.5–1.3)
EGFR: 67 ML/MIN/1.73M2 — SIGNIFICANT CHANGE UP
GLUCOSE BLDC GLUCOMTR-MCNC: 172 MG/DL — HIGH (ref 70–99)
GLUCOSE BLDC GLUCOMTR-MCNC: 179 MG/DL — HIGH (ref 70–99)
GLUCOSE BLDC GLUCOMTR-MCNC: 298 MG/DL — HIGH (ref 70–99)
GLUCOSE BLDC GLUCOMTR-MCNC: 302 MG/DL — HIGH (ref 70–99)
GLUCOSE SERPL-MCNC: 176 MG/DL — HIGH (ref 70–99)
HCT VFR BLD CALC: 31.7 % — LOW (ref 34.5–45)
HGB BLD-MCNC: 10.6 G/DL — LOW (ref 11.5–15.5)
MAGNESIUM SERPL-MCNC: 1.9 MG/DL — SIGNIFICANT CHANGE UP (ref 1.6–2.6)
MCHC RBC-ENTMCNC: 29 PG — SIGNIFICANT CHANGE UP (ref 27–34)
MCHC RBC-ENTMCNC: 33.4 GM/DL — SIGNIFICANT CHANGE UP (ref 32–36)
MCV RBC AUTO: 86.8 FL — SIGNIFICANT CHANGE UP (ref 80–100)
NRBC # BLD: 0 /100 WBCS — SIGNIFICANT CHANGE UP (ref 0–0)
NRBC # FLD: 0 K/UL — SIGNIFICANT CHANGE UP (ref 0–0)
PHOSPHATE SERPL-MCNC: 3.5 MG/DL — SIGNIFICANT CHANGE UP (ref 2.5–4.5)
PLATELET # BLD AUTO: 187 K/UL — SIGNIFICANT CHANGE UP (ref 150–400)
POTASSIUM SERPL-MCNC: 3.4 MMOL/L — LOW (ref 3.5–5.3)
POTASSIUM SERPL-SCNC: 3.4 MMOL/L — LOW (ref 3.5–5.3)
RBC # BLD: 3.65 M/UL — LOW (ref 3.8–5.2)
RBC # FLD: 12.8 % — SIGNIFICANT CHANGE UP (ref 10.3–14.5)
SODIUM SERPL-SCNC: 141 MMOL/L — SIGNIFICANT CHANGE UP (ref 135–145)
WBC # BLD: 7.72 K/UL — SIGNIFICANT CHANGE UP (ref 3.8–10.5)
WBC # FLD AUTO: 7.72 K/UL — SIGNIFICANT CHANGE UP (ref 3.8–10.5)

## 2022-12-29 PROCEDURE — 99232 SBSQ HOSP IP/OBS MODERATE 35: CPT

## 2022-12-29 RX ORDER — POTASSIUM CHLORIDE 20 MEQ
40 PACKET (EA) ORAL ONCE
Refills: 0 | Status: COMPLETED | OUTPATIENT
Start: 2022-12-29 | End: 2022-12-29

## 2022-12-29 RX ORDER — POTASSIUM CHLORIDE 20 MEQ
20 PACKET (EA) ORAL ONCE
Refills: 0 | Status: COMPLETED | OUTPATIENT
Start: 2022-12-29 | End: 2022-12-29

## 2022-12-29 RX ORDER — INSULIN GLARGINE 100 [IU]/ML
58 INJECTION, SOLUTION SUBCUTANEOUS AT BEDTIME
Refills: 0 | Status: DISCONTINUED | OUTPATIENT
Start: 2022-12-29 | End: 2022-12-30

## 2022-12-29 RX ORDER — INSULIN LISPRO 100/ML
22 VIAL (ML) SUBCUTANEOUS
Refills: 0 | Status: DISCONTINUED | OUTPATIENT
Start: 2022-12-29 | End: 2022-12-30

## 2022-12-29 RX ADMIN — Medication 20 UNIT(S): at 09:08

## 2022-12-29 RX ADMIN — Medication 4: at 23:09

## 2022-12-29 RX ADMIN — Medication 2: at 17:51

## 2022-12-29 RX ADMIN — PANTOPRAZOLE SODIUM 40 MILLIGRAM(S): 20 TABLET, DELAYED RELEASE ORAL at 05:41

## 2022-12-29 RX ADMIN — LOSARTAN POTASSIUM 100 MILLIGRAM(S): 100 TABLET, FILM COATED ORAL at 05:42

## 2022-12-29 RX ADMIN — Medication 40 MILLIEQUIVALENT(S): at 09:09

## 2022-12-29 RX ADMIN — Medication 50 MICROGRAM(S): at 05:42

## 2022-12-29 RX ADMIN — Medication 22 UNIT(S): at 17:51

## 2022-12-29 RX ADMIN — Medication 6 MILLIGRAM(S): at 05:42

## 2022-12-29 RX ADMIN — Medication 60 MILLIGRAM(S): at 12:37

## 2022-12-29 RX ADMIN — CARVEDILOL PHOSPHATE 25 MILLIGRAM(S): 80 CAPSULE, EXTENDED RELEASE ORAL at 05:41

## 2022-12-29 RX ADMIN — INSULIN GLARGINE 58 UNIT(S): 100 INJECTION, SOLUTION SUBCUTANEOUS at 23:09

## 2022-12-29 RX ADMIN — ENOXAPARIN SODIUM 40 MILLIGRAM(S): 100 INJECTION SUBCUTANEOUS at 12:24

## 2022-12-29 RX ADMIN — Medication 2: at 09:08

## 2022-12-29 RX ADMIN — Medication 6: at 12:26

## 2022-12-29 RX ADMIN — Medication 20 MILLIEQUIVALENT(S): at 13:09

## 2022-12-29 RX ADMIN — CARVEDILOL PHOSPHATE 25 MILLIGRAM(S): 80 CAPSULE, EXTENDED RELEASE ORAL at 17:54

## 2022-12-29 RX ADMIN — Medication 22 UNIT(S): at 12:27

## 2022-12-29 NOTE — DISCHARGE NOTE PROVIDER - NSDCMRMEDTOKEN_GEN_ALL_CORE_FT
atorvastatin 40 mg oral tablet: 1 tab(s) orally once a day  CARVEDILOL 25 MG TABLET: TAKE 1 TABLET BY MOUTH TWICE A DAY  latanoprost 0.005% ophthalmic solution: to each affected eye once a day (at bedtime)  Levemir FlexPen 100 units/mL subcutaneous solution:   levothyroxine 50 mcg (0.05 mg) oral tablet: orally every other day   metFORMIN 1000 mg oral tablet: 1 tab(s) orally 2 times a day  NIFEdipine: 60 milligram(s) orally 2 times a day  telmisartan-hydrochlorothiazide 80 mg-12.5 mg oral tablet: 1 tab(s) orally once a day   atorvastatin 40 mg oral tablet: 1 tab(s) orally once a day  CARVEDILOL 25 MG TABLET: TAKE 1 TABLET BY MOUTH TWICE A DAY  latanoprost 0.005% ophthalmic solution: to each affected eye once a day (at bedtime)  Levemir FlexPen 100 units/mL subcutaneous solution: 46 unit(s) subcutaneous 2 times a day  levothyroxine 50 mcg (0.05 mg) oral tablet: orally every other day   metFORMIN 1000 mg oral tablet: 1 tab(s) orally 2 times a day  NIFEdipine: 60 milligram(s) orally 2 times a day  telmisartan-hydrochlorothiazide 80 mg-12.5 mg oral tablet: 1 tab(s) orally once a day

## 2022-12-29 NOTE — DISCHARGE NOTE PROVIDER - CARE PROVIDER_API CALL
Heraclio Barnhart)  Surgery; Thoracic Surgery  [address] [place] [address] Oncology [facility]  [address]rd [place] [address]  Phone: [phone]  Fax: [phone]  Follow Up Time:

## 2022-12-29 NOTE — DISCHARGE NOTE NURSING/CASE MANAGEMENT/SOCIAL WORK - NSDCPEFALRISK_GEN_ALL_CORE
For information on Fall & Injury Prevention, visit: [email or web address] OR  [email or web address] OR  https://www.cdc.gov/steadi/patient.html

## 2022-12-29 NOTE — DISCHARGE NOTE NURSING/CASE MANAGEMENT/SOCIAL WORK - PATIENT PORTAL LINK FT
You can access the FollowMyHealth Patient Portal offered by [facility] by registering at the following website: [email or web address]. By joining [employer]’s FollowMyHealth portal, you will also be able to view your health information using other applications (apps) compatible with our system.

## 2022-12-29 NOTE — PHYSICAL THERAPY INITIAL EVALUATION ADULT - GENERAL OBSERVATIONS, REHAB EVAL
Patient received in semifowler position in bed in NAD +3L O2 via NC. Patient denies chest pain, SOB, headache, and dizziness.

## 2022-12-29 NOTE — DISCHARGE NOTE PROVIDER - NSDCFUADDAPPT_GEN_ALL_CORE_FT
Please see Dr. Barnhart in about 2-3 weeks to review scan, discuss further workup and any possible surgical plans, please Call to make an apt. at [phone] Please see Dr. Barnhart in about 2-3 weeks to review scan, discuss further workup and any possible surgical plans, please Call to make an apt. at [phone]    Please follow up at [facility] [place] [address] [address]; [phone]

## 2022-12-29 NOTE — DISCHARGE NOTE NURSING/CASE MANAGEMENT/SOCIAL WORK - NSDCFUADDAPPT_GEN_ALL_CORE_FT
Please see Dr. Barnhart in about 2-3 weeks to review scan, discuss further workup and any possible surgical plans, please Call to make an apt. at [phone]    Please follow up at [facility] [place] [address] [address]; [phone]

## 2022-12-29 NOTE — PHYSICAL THERAPY INITIAL EVALUATION ADULT - ADDITIONAL COMMENTS
patient reports she ambulates with a cane and occasionally walker as needed. she reports she still drives, but mostly ambulates in her home. denies any recent falls history

## 2022-12-29 NOTE — PHYSICAL THERAPY INITIAL EVALUATION ADULT - GAIT DEVIATIONS NOTED, PT EVAL
decreased anthony/increased time in double stance/decreased velocity of limb motion/decreased step length/decreased weight-shifting ability

## 2022-12-29 NOTE — PROGRESS NOTE ADULT - PROBLEM SELECTOR PLAN 2
Rhogam and tdap today  Gluc screen today  On ASA  Growth us at 32 weeks and weekly bpp start then also  Plan repeat c section  7/9 due to chronic hypertension, not on meds, AMA and history of prior c section, over 41 yo  Will be 38 5/7 wks on that Friday  Return 2 wks   CTA chest with BL patchy airspace opacities, predominantly in the lower lobes, R>L c/w multifocal PNA and small L pleural effusion  likely in setting of known COVID with superimposed bacterial PNA  - procal 1.49  - s/p CTX/azithromycin x7d total course  - BCx NGTD

## 2022-12-29 NOTE — DISCHARGE NOTE PROVIDER - NSDCHHNEEDSERVICE_GEN_ALL_CORE
Medication teaching and assessment/Observation and assessment/Rehabilitation services/Teaching and training/Wound care and assessment Medication teaching and assessment/Observation and assessment/Rehabilitation services/Teaching and training

## 2022-12-29 NOTE — PROGRESS NOTE ADULT - ASSESSMENT
Pt is a 78 yo F with PMH HTN, HLD, T2D, and partial thyroidectomy p/t OSH with worsening weakness, SOB, hypoxia, and known COVID+ (urgent care 12/18). Found to have COVID with superimposed multifocal PNA and mediastinal mass. Transferred to [facility] for CT Sx evaluation.

## 2022-12-29 NOTE — DISCHARGE NOTE PROVIDER - NSDCCAREPROVSEEN_GEN_ALL_CORE_FT
Met with pt and provided list of accepting [facility] facilities. [facility] and [facility] have each confirmed they have a lymphedema specialist.  Pt to consider options and inform SW of preferred DC plan. / to remain available for support and/or discharge planning.      Eder Quinn, [facility]  Discharge Planner  [phone] [facility]

## 2022-12-29 NOTE — PROGRESS NOTE ADULT - ASSESSMENT
78 y/o female with PMHx HTN, Hypothyroidism, DM initially presented to [facility] with complaints of SOB/ dyspnea starting since this morning. Pt reports that her sx started on sunday with rhinorrhea and intermittent cough found to have COVID-19 infection. Endocrinology consulted for management of diabetes and steroid hyperglycemia.    Poorly controlled T2DM with hyperglycemia  Steroid Hyperglycemia  - HbA1c: 6.5  - Home Regimen: levemir 46 units bid, metformin 1000mg bid  - Endocrinologist: does not have  - currently on dexamethasone 6mg daily for COVID from 12/22 until 1/1 ; please notify Endocrine if any change in Steroid dosing as this may affect insulin dosing  PLAN  - Hold oral DM agents while inpatient  - can change IV medication to non dextrose containing formulation  - Increase Lantus to 58 units at bedtime. DO NOT HOLD IF NPO.  - Increase Admelog to 22 units TID pre-meal. HOLD IF NPO.  - Use moderate dose Admelog correction scale pre-meal  - Use moderate dose Admelog correction scale at bedtime  - Fingerstick BG before meals and bedtime  - Goal -180: above goal; suspect pt is eating in between meals. RN made aware. Educate pt. Pt verbalized understanding   - Carbohydrate consistent diet  Discharge plan:  - Discharge medications: Home regimen TBD based on whether pt is going home on steroids. If on steroids would need Basal/ bolus doses TBD and Metformin 1 g BID with meals. If off steroid can continue home regimen Levemir 46 units bid, metformin 1000mg bid with meals. Please clarify with Endocrine on day of discharge.   - Patient will need education on how to self-administer insulin, how to check FSBG, as well as hypoglycemia management. Patient to call doctor with persistent high or low BG at home.   - Ensure patient has glucometer, test strips and lancets on discharge.  - Recommend routine outpatient ophthalmology, podiatry and endocrinology f/u  -For now pt prefers to follow up with PCP for DM mgmt. If patient wishes she may follow up at [facility] [place] [address] [address]; [phone]    Hypothyroidism  Hx Goiter  Partial thyroidectomy  - patient reports hx of goiter with weight loss 25 years ago s/p partial thyroidectomy  - on levothyroxine 50mcg daily  - appears clinically euthyroid  PLAN  - continue levothyroxine 50mcg daily (maintain on empty stomach (at least 1 hour before meals), separate by 4 hours from PPI or calcium supplementation which can inhibit its absorption)  -  TSH 0.66 WNL and FT4 1.4 WNL     Left Adrenal Gland Nodule  - CT showed indeterminant left adrenal gland nodule  - patient is on high dose steroids with dexamethasone  - did not appear cushingoid on exam  - patient is hypertensive, K and Na within normal limits  PLAN  - can check am plasma metanephrines, plasma renin activity, serum aldosterone (4.2 WNL)  - defer cushings workup to outpatient given on dexamethasone    HTN  - Home regimen: carvedilol, nifedipine, telmisartan-hctz  PLAN  - Can check urine microalbumin outpatient  - Outpatient goal BP <130/80. Management per primary team.  - above adrenal gland nodule    HLD  - Home regimen: atorvastatin 40mg daily  PLAN  - Continue atorvastatin 40mg daily  - Can check lipid profile if not done recently    Ana Lamas  Nurse Practitioner  Division of Endocrinology & Diabetes  In house pager #[phone]    If before 9AM or after 6PM, or on weekends/holidays, please call endocrine answering service for assistance ([phone]).For nonurgent matters email [email or web address] for assistance.

## 2022-12-29 NOTE — DISCHARGE NOTE PROVIDER - HOSPITAL COURSE
Pt is a 78 yo F with PMH HTN, HLD, T2D, and partial thyroidectomy p/t OSH with worsening weakness, SOB, hypoxia, and known COVID+ (urgent care 12/18). Found to have COVID with superimposed multifocal PNA and mediastinal mass. Transferred to [facility] for CT Sx evaluation.    Acute hypoxemic respiratory failure due to COVID-19.   - Weakness, SOB, and hypoxia with known COVID+ 12/18 at Urgent Care; transferred from OSH for management of mediastinal mass  - acute hypoxic respiratory failure d/t COVID pneumonia, had required HFNC but now back on nasal cannula   - OSH CTA chest neg PE but with BL GGO and MF PNA as below  - COVID+ with procal 1.49  - s/p Remdesivir, can discontinue decadron today   - PRN duonebs  - weaned off O2 as tolerated, currently on RA. Will check ambulatory O2 sat.    Multifocal pneumonia.   - CTA chest with BL patchy airspace opacities, predominantly in the lower lobes, R>L c/w multifocal PNA and small L pleural effusion  likely in setting of known COVID with superimposed bacterial PNA  - procal 1.49  - s/p CTX/azithromycin x7d total course  - BCx NGTD.    Mediastinal mass.   - CTA chest with large 7.8cm heterogenous structure with internal and peripheral calcification within the middle mediastinum splaying the trachea and esophagus, exerting mass effect on both; c/f mediastinal mass vs. vascular structure or aneurysm   - CT Sx consulted  - CT chest showed stable middle mediastinal mass, ectopic thyroid is a consideration  - Patient to follow up with CT surgery as outpatient to discuss further w/up and possible surgical plans.    Dizziness.   - Possibly COVID related   - check orthostatics.    Adrenal nodule.   - CTA chest with indeterminate 2.6cm L adrenal nodule  - outpt f/u.    HTN (hypertension).   - home meds: coreg 25mg BID, nifedipine 60mg BID, and telmisartan-HCTZ 80-12.5mg daily  - c/w coreg, losartan, and nifedipine 60mg BID  - monitor BP.    HLD (hyperlipidemia).   - home med: atorvastatin 40mg daily   - c/w home med.    Type 2 diabetes mellitus.   - home med: metformin 1g BID, levemir 44U at bedtime  - A1C 6.5  - course c/b steroid induced hyperglycemia   - c/w Lantus 58U bedtime  - c/w Admelog 22U TID pre-meals  - c/w mISS  - plan to discontinue Dexamethasone today and will notify Endo to adjust insulin dose.    Thyroid, ectopic.   - pt with hx ectopic thyroid s/p sternotomy with partial thyroidectomy; on synthroid 50mcg daily to "suppress" additional thyroid growth  - c/w synthroid.    Nutrition, metabolism, and development symptoms.   - F: none  - E: replete K<4, Mg<2  - N: DASH/TLC, carb consistent  - D: lovenox 40mg q24h    Patient seen and evaluated. Reviewed discharge medications with patient and attending. All new medications requiring new prescriptions were sent to the pharmacy of patient's choice. Reviewed need for prescription for previous home medications and new prescriptions sent if requested. Medically cleared/stable for discharge as per Dr. House on 12/31/22 with appropriate follow up. Patient understands and agrees with plan of care. Pt is a 76 yo F with PMH HTN, HLD, T2D, and partial thyroidectomy p/t OSH with worsening weakness, SOB, hypoxia, and known COVID+ (urgent care 12/18). Found to have COVID with superimposed multifocal PNA and mediastinal mass. Transferred to [facility] for CT Sx evaluation.    Acute hypoxemic respiratory failure due to COVID-19.   - acute hypoxic respiratory failure d/t COVID pneumonia, had required HFNC but then transitioned back to nasal cannula   - OSH CTA chest neg for PE but with BL GGO and MF PNA as below  - COVID+ with procal 1.49  - s/p Remdesivir and Decadron   - PRN duonebs  - weaned off O2, sating well on RA.     Multifocal pneumonia.   - CTA chest with BL patchy airspace opacities, predominantly in the lower lobes, R>L c/w multifocal PNA and small L pleural effusion  likely in setting of known COVID with superimposed bacterial PNA  - procal 1.49  - s/p CTX/azithromycin x7d total course  - BCx NGTD.    Mediastinal mass.   - CTA chest with large 7.8cm heterogenous structure with internal and peripheral calcification within the middle mediastinum splaying the trachea and esophagus, exerting mass effect on both; c/f mediastinal mass vs. vascular structure or aneurysm   - CT Sx consulted  - CT chest showed stable middle mediastinal mass, ectopic thyroid is a consideration  - Patient to follow up with CT surgery as outpatient to discuss further w/up and possible surgical plans.      Adrenal nodule.   - CTA chest with indeterminate 2.6cm L adrenal nodule  - outpt f/u.    HTN (hypertension).   - home meds: coreg 25mg BID, nifedipine 60mg BID, and telmisartan-HCTZ 80-12.5mg daily  - c/w coreg, losartan, and nifedipine 60mg BID  - monitor BP.    HLD (hyperlipidemia).   - home med: atorvastatin 40mg daily   - c/w home med.    Type 2 diabetes mellitus.   - home med: metformin 1g BID, levemir 44U at bedtime  - A1C 6.5  - course c/b steroid induced hyperglycemia, Decadron discontinued   - c/w Lantus 50U bedtime  - c/w Admelog 15U TID pre-meals  - c/w mISS  - discharge meds as per Endo     Thyroid, ectopic.   - pt with hx ectopic thyroid s/p sternotomy with partial thyroidectomy; on synthroid 50mcg daily to "suppress" additional thyroid growth  - c/w synthroid.      Patient seen and evaluated. Reviewed discharge medications with patient and attending. All new medications requiring new prescriptions were sent to the pharmacy of patient's choice. Reviewed need for prescription for previous home medications and new prescriptions sent if requested. Medically cleared/stable for discharge as per Dr. House on 12/31/22 with appropriate follow up. Patient understands and agrees with plan of care.

## 2022-12-29 NOTE — DISCHARGE NOTE PROVIDER - NSDCCPCAREPLAN_GEN_ALL_CORE_FT
PRINCIPAL DISCHARGE DIAGNOSIS  Diagnosis: Acute COVID-19  Assessment and Plan of Treatment: You were admitted with Covid-19, your breathig has improved and you no longer require oxygen, you are to go home and follow up with your PCP within 1-2 weeks.      SECONDARY DISCHARGE DIAGNOSES  Diagnosis: Multifocal pneumonia  Assessment and Plan of Treatment: You were found to have pneumonia due to Covid-19, your breathing has improved, you received antibitoics while in the hospital, please follow up with your PCP.    Diagnosis: HTN (hypertension)  Assessment and Plan of Treatment: Continue blood pressure medication regimen as directed. Monitor for any visual changes, headaches or dizziness.  Monitor blood pressure regularly.  Follow up with your primary care provider for further management for high blood pressure.    Diagnosis: HLD (hyperlipidemia)  Assessment and Plan of Treatment: Continue prescribed medications to control your cholesterol levels and a DASH (Low fat/salt) diet. Follow up with your primary care provider upon discharge for further management and monitoring of cholesterol levels.    Diagnosis: Hypothyroidism  Assessment and Plan of Treatment: Please continue home medications and follow up with your PCP    Diagnosis: Type 2 diabetes mellitus  Assessment and Plan of Treatment: Please continue home medications and follow up with your PCP    Diagnosis: Mediastinal mass  Assessment and Plan of Treatment: During your stay you had a cat scan of your chest which showed a mass, you were seen by the thoracic surgeon and need to follow up in 2-3 weeks with Dr. Barnhart, please call to make an appointment     PRINCIPAL DISCHARGE DIAGNOSIS  Diagnosis: Acute COVID-19  Assessment and Plan of Treatment: You were admitted with Covid-19, your breathig has improved and you no longer require oxygen, you are to go home and follow up with your PCP within 1-2 weeks.      SECONDARY DISCHARGE DIAGNOSES  Diagnosis: Multifocal pneumonia  Assessment and Plan of Treatment: You were found to have pneumonia due to Covid-19, your breathing has improved, you received antibitoics while in the hospital, please follow up with your PCP.    Diagnosis: HTN (hypertension)  Assessment and Plan of Treatment: Continue blood pressure medication regimen as directed. Monitor for any visual changes, headaches or dizziness.  Monitor blood pressure regularly.  Follow up with your primary care provider for further management for high blood pressure.    Diagnosis: HLD (hyperlipidemia)  Assessment and Plan of Treatment: Continue prescribed medications to control your cholesterol levels and a DASH (Low fat/salt) diet. Follow up with your primary care provider upon discharge for further management and monitoring of cholesterol levels.    Diagnosis: Hypothyroidism  Assessment and Plan of Treatment: Please continue home medications and follow up with your PCP    Diagnosis: Type 2 diabetes mellitus  Assessment and Plan of Treatment: Please continue home medications and follow up with the endocrinologist for your diabetes management as well as workup for cushings and your goiter.    Diagnosis: Mediastinal mass  Assessment and Plan of Treatment: During your stay you had a cat scan of your chest which showed a mass, you were seen by the thoracic surgeon and need to follow up in 2-3 weeks with Dr. Barnhart, please call to make an appointment

## 2022-12-30 DIAGNOSIS — R42 DIZZINESS AND GIDDINESS: ICD-10-CM

## 2022-12-30 LAB
ANION GAP SERPL CALC-SCNC: 7 MMOL/L — SIGNIFICANT CHANGE UP (ref 7–14)
BUN SERPL-MCNC: 16 MG/DL — SIGNIFICANT CHANGE UP (ref 7–23)
CALCIUM SERPL-MCNC: 8.7 MG/DL — SIGNIFICANT CHANGE UP (ref 8.4–10.5)
CHLORIDE SERPL-SCNC: 106 MMOL/L — SIGNIFICANT CHANGE UP (ref 98–107)
CO2 SERPL-SCNC: 28 MMOL/L — SIGNIFICANT CHANGE UP (ref 22–31)
CREAT SERPL-MCNC: 0.81 MG/DL — SIGNIFICANT CHANGE UP (ref 0.5–1.3)
EGFR: 75 ML/MIN/1.73M2 — SIGNIFICANT CHANGE UP
GLUCOSE BLDC GLUCOMTR-MCNC: 100 MG/DL — HIGH (ref 70–99)
GLUCOSE BLDC GLUCOMTR-MCNC: 168 MG/DL — HIGH (ref 70–99)
GLUCOSE BLDC GLUCOMTR-MCNC: 176 MG/DL — HIGH (ref 70–99)
GLUCOSE BLDC GLUCOMTR-MCNC: 239 MG/DL — HIGH (ref 70–99)
GLUCOSE SERPL-MCNC: 83 MG/DL — SIGNIFICANT CHANGE UP (ref 70–99)
HCT VFR BLD CALC: 31.6 % — LOW (ref 34.5–45)
HGB BLD-MCNC: 10.6 G/DL — LOW (ref 11.5–15.5)
MAGNESIUM SERPL-MCNC: 1.8 MG/DL — SIGNIFICANT CHANGE UP (ref 1.6–2.6)
MCHC RBC-ENTMCNC: 28.5 PG — SIGNIFICANT CHANGE UP (ref 27–34)
MCHC RBC-ENTMCNC: 33.5 GM/DL — SIGNIFICANT CHANGE UP (ref 32–36)
MCV RBC AUTO: 84.9 FL — SIGNIFICANT CHANGE UP (ref 80–100)
NRBC # BLD: 0 /100 WBCS — SIGNIFICANT CHANGE UP (ref 0–0)
NRBC # FLD: 0 K/UL — SIGNIFICANT CHANGE UP (ref 0–0)
PHOSPHATE SERPL-MCNC: 3.5 MG/DL — SIGNIFICANT CHANGE UP (ref 2.5–4.5)
PLATELET # BLD AUTO: 199 K/UL — SIGNIFICANT CHANGE UP (ref 150–400)
POTASSIUM SERPL-MCNC: 3.6 MMOL/L — SIGNIFICANT CHANGE UP (ref 3.5–5.3)
POTASSIUM SERPL-SCNC: 3.6 MMOL/L — SIGNIFICANT CHANGE UP (ref 3.5–5.3)
RBC # BLD: 3.72 M/UL — LOW (ref 3.8–5.2)
RBC # FLD: 12.7 % — SIGNIFICANT CHANGE UP (ref 10.3–14.5)
SODIUM SERPL-SCNC: 141 MMOL/L — SIGNIFICANT CHANGE UP (ref 135–145)
WBC # BLD: 6.1 K/UL — SIGNIFICANT CHANGE UP (ref 3.8–10.5)
WBC # FLD AUTO: 6.1 K/UL — SIGNIFICANT CHANGE UP (ref 3.8–10.5)

## 2022-12-30 PROCEDURE — 99232 SBSQ HOSP IP/OBS MODERATE 35: CPT

## 2022-12-30 RX ORDER — INSULIN LISPRO 100/ML
15 VIAL (ML) SUBCUTANEOUS
Refills: 0 | Status: DISCONTINUED | OUTPATIENT
Start: 2022-12-30 | End: 2022-12-31

## 2022-12-30 RX ORDER — INSULIN GLARGINE 100 [IU]/ML
50 INJECTION, SOLUTION SUBCUTANEOUS AT BEDTIME
Refills: 0 | Status: DISCONTINUED | OUTPATIENT
Start: 2022-12-30 | End: 2022-12-31

## 2022-12-30 RX ADMIN — CARVEDILOL PHOSPHATE 25 MILLIGRAM(S): 80 CAPSULE, EXTENDED RELEASE ORAL at 17:34

## 2022-12-30 RX ADMIN — Medication 22 UNIT(S): at 09:07

## 2022-12-30 RX ADMIN — LOSARTAN POTASSIUM 100 MILLIGRAM(S): 100 TABLET, FILM COATED ORAL at 05:31

## 2022-12-30 RX ADMIN — INSULIN GLARGINE 50 UNIT(S): 100 INJECTION, SOLUTION SUBCUTANEOUS at 22:33

## 2022-12-30 RX ADMIN — Medication 60 MILLIGRAM(S): at 22:06

## 2022-12-30 RX ADMIN — Medication 22 UNIT(S): at 12:49

## 2022-12-30 RX ADMIN — Medication 50 MICROGRAM(S): at 05:31

## 2022-12-30 RX ADMIN — ATORVASTATIN CALCIUM 40 MILLIGRAM(S): 80 TABLET, FILM COATED ORAL at 22:06

## 2022-12-30 RX ADMIN — Medication 2: at 17:41

## 2022-12-30 RX ADMIN — Medication 6 MILLIGRAM(S): at 05:29

## 2022-12-30 RX ADMIN — ENOXAPARIN SODIUM 40 MILLIGRAM(S): 100 INJECTION SUBCUTANEOUS at 12:50

## 2022-12-30 RX ADMIN — Medication 15 UNIT(S): at 17:41

## 2022-12-30 RX ADMIN — CARVEDILOL PHOSPHATE 25 MILLIGRAM(S): 80 CAPSULE, EXTENDED RELEASE ORAL at 05:30

## 2022-12-30 RX ADMIN — Medication 60 MILLIGRAM(S): at 12:49

## 2022-12-30 RX ADMIN — Medication 2: at 12:49

## 2022-12-30 RX ADMIN — LATANOPROST 1 DROP(S): 0.05 SOLUTION/ DROPS OPHTHALMIC; TOPICAL at 22:06

## 2022-12-30 RX ADMIN — PANTOPRAZOLE SODIUM 40 MILLIGRAM(S): 20 TABLET, DELAYED RELEASE ORAL at 05:31

## 2022-12-30 NOTE — PROGRESS NOTE ADULT - ASSESSMENT
Pt is a 76 yo F with PMH HTN, HLD, T2D, and partial thyroidectomy p/t OSH with worsening weakness, SOB, hypoxia, and known COVID+ (urgent care 12/18). Found to have COVID with superimposed multifocal PNA and mediastinal mass. Transferred to [facility] for CT Sx evaluation.

## 2022-12-30 NOTE — CHART NOTE - NSCHARTNOTESELECT_GEN_ALL_CORE
Endocrinology/Event Note
Event Note
Thoracic Surgery/Event Note
Thoracic surgery
endocrinology/Event Note

## 2022-12-30 NOTE — CHART NOTE - NSCHARTNOTEFT_GEN_A_CORE
76 y/o female with PMHx HTN, Hypothyroidism, DM initially presented to [facility] with complaints of SOB/ dyspnea starting since this morning. Pt reports that her sx started on sunday with rhinorrhea and intermittent cough found to have COVID-19 infection. Endocrinology consulted for management of diabetes and steroid hyperglycemia.    CAPILLARY BLOOD GLUCOSE  POCT Blood Glucose.: 176 mg/dL (30 Dec 2022 12:37)  POCT Blood Glucose.: 100 mg/dL (30 Dec 2022 08:49)  POCT Blood Glucose.: 302 mg/dL (29 Dec 2022 23:05)  POCT Blood Glucose.: 172 mg/dL (29 Dec 2022 17:18)    12-30    141  |  106  |  16  ----------------------------<  83  3.6   |  28  |  0.81    Ca    8.7      30 Dec 2022 06:16  Phos  3.5     12-30  Mg     1.80     12-30        Poorly controlled T2DM with hyperglycemia  Steroid Hyperglycemia  - HbA1c: 6.5  - Home Regimen: levemir 46 units bid, metformin 1000mg bid  - Endocrinologist: does not have  - currently on dexamethasone 6mg daily for COVID from 12/22 until 1/1 ; please notify Endocrine if any change in Steroid dosing as this may affect insulin dosing  PLAN  - Hold oral DM agents while inpatient  - can change IV medication to non dextrose containing formulation  - Continue Lantus 58 units at bedtime. DO NOT HOLD IF NPO.  - Continue Admelog 22 units TID pre-meal. HOLD IF NPO.  - Use moderate dose Admelog correction scale pre-meal  - Use moderate dose Admelog correction scale at bedtime  - Fingerstick BG before meals and bedtime  - Goal -180: above goal; suspect pt is eating in between meals. RN made aware. Educate pt. Pt verbalized understanding   - Carbohydrate consistent diet  Discharge plan:  - Discharge medications: Home regimen TBD based on whether pt is going home on steroids. If on steroids would need Basal/ bolus doses TBD and Metformin 1 g BID with meals. If off steroid can continue home regimen Levemir 46 units bid, metformin 1000mg bid with meals. Please clarify with Endocrine on day of discharge.   - Patient will need education on how to self-administer insulin, how to check FSBG, as well as hypoglycemia management. Patient to call doctor with persistent high or low BG at home.   - Ensure patient has glucometer, test strips and lancets on discharge.  - Recommend routine outpatient ophthalmology, podiatry and endocrinology f/u  -For now pt prefers to follow up with PCP for DM mgmt. If patient wishes she may follow up at [facility] [place] [address] [address]; [phone]    Hypothyroidism  Hx Goiter  Partial thyroidectomy  - patient reports hx of goiter with weight loss 25 years ago s/p partial thyroidectomy  - on levothyroxine 50mcg daily  - appears clinically euthyroid  PLAN  - continue levothyroxine 50mcg daily (maintain on empty stomach (at least 1 hour before meals), separate by 4 hours from PPI or calcium supplementation which can inhibit its absorption)  -  TSH 0.66 WNL and FT4 1.4 WNL     Left Adrenal Gland Nodule  - CT showed indeterminant left adrenal gland nodule  - patient is on high dose steroids with dexamethasone  - did not appear cushingoid on exam  - patient is hypertensive, K and Na within normal limits  PLAN  - can check am plasma metanephrines, plasma renin activity, serum aldosterone (4.2 WNL)  - defer cushings workup to outpatient given on dexamethasone      Hannah Bueno  Nurse Practitioner  Division of Endocrinology & Diabetes  Pager # [phone]      If after 6PM or before 9AM, or on weekends/holidays, please call endocrine answering service for assistance ([phone]).  For nonurgent matters email [email or web address] for assistance. 76 y/o female with PMHx HTN, Hypothyroidism, DM initially presented to [facility] with complaints of SOB/ dyspnea starting since this morning. Pt reports that her sx started on sunday with rhinorrhea and intermittent cough found to have COVID-19 infection. Endocrinology consulted for management of diabetes and steroid hyperglycemia.    CAPILLARY BLOOD GLUCOSE  POCT Blood Glucose.: 176 mg/dL (30 Dec 2022 12:37)  POCT Blood Glucose.: 100 mg/dL (30 Dec 2022 08:49)  POCT Blood Glucose.: 302 mg/dL (29 Dec 2022 23:05)  POCT Blood Glucose.: 172 mg/dL (29 Dec 2022 17:18)    12-30    141  |  106  |  16  ----------------------------<  83  3.6   |  28  |  0.81    Ca    8.7      30 Dec 2022 06:16  Phos  3.5     12-30  Mg     1.80     12-30        Poorly controlled T2DM with hyperglycemia  Steroid Hyperglycemia  - HbA1c: 6.5  - Home Regimen: levemir 46 units bid, metformin 1000mg bid  - Endocrinologist: does not have  - Dexamethasone dc'd today, expect insulin requirements to decrease  PLAN  - Hold oral DM agents while inpatient  - can change IV medication to non dextrose containing formulation  - Decrease Lantus to 50 units at bedtime. DO NOT HOLD IF NPO.  - Decrease Admelog to 15 units TID pre-meal. HOLD IF NPO.  - Use moderate dose Admelog correction scale pre-meal  - Use moderate dose Admelog correction scale at bedtime  - Fingerstick BG before meals and bedtime  - Goal -180: above goal; suspect pt is eating in between meals. RN made aware. Educate pt. Pt verbalized understanding   - Carbohydrate consistent diet  Discharge plan:  - Discharge medications: Home regimen TBD based on whether pt is going home on steroids. If on steroids would need Basal/ bolus doses TBD and Metformin 1 g BID with meals. If off steroid can continue home regimen Levemir 46 units bid, metformin 1000mg bid with meals. Please clarify with Endocrine on day of discharge.   - Patient will need education on how to self-administer insulin, how to check FSBG, as well as hypoglycemia management. Patient to call doctor with persistent high or low BG at home.   - Ensure patient has glucometer, test strips and lancets on discharge.  - Recommend routine outpatient ophthalmology, podiatry and endocrinology f/u  -For now pt prefers to follow up with PCP for DM mgmt. If patient wishes she may follow up at [facility] [place] [address] [address]; [phone]    Hypothyroidism  Hx Goiter  Partial thyroidectomy  - patient reports hx of goiter with weight loss 25 years ago s/p partial thyroidectomy  - on levothyroxine 50mcg daily  - appears clinically euthyroid  PLAN  - continue levothyroxine 50mcg daily (maintain on empty stomach (at least 1 hour before meals), separate by 4 hours from PPI or calcium supplementation which can inhibit its absorption)  -  TSH 0.66 WNL and FT4 1.4 WNL     Left Adrenal Gland Nodule  - CT showed indeterminant left adrenal gland nodule  - patient is on high dose steroids with dexamethasone  - did not appear cushingoid on exam  - patient is hypertensive, K and Na within normal limits  PLAN  - can check am plasma metanephrines, plasma renin activity, serum aldosterone (4.2 WNL)  - defer cushings workup to outpatient given on dexamethasone      Hannah Bueno  Nurse Practitioner  Division of Endocrinology & Diabetes  Pager # [phone]      If after 6PM or before 9AM, or on weekends/holidays, please call endocrine answering service for assistance ([phone]).  For nonurgent matters email [email or web address] for assistance.

## 2022-12-31 VITALS
RESPIRATION RATE: 17 BRPM | OXYGEN SATURATION: 99 % | DIASTOLIC BLOOD PRESSURE: 55 MMHG | TEMPERATURE: 98 F | HEART RATE: 61 BPM | SYSTOLIC BLOOD PRESSURE: 123 MMHG

## 2022-12-31 LAB
ANION GAP SERPL CALC-SCNC: 11 MMOL/L — SIGNIFICANT CHANGE UP (ref 7–14)
BUN SERPL-MCNC: 25 MG/DL — HIGH (ref 7–23)
CALCIUM SERPL-MCNC: 8.7 MG/DL — SIGNIFICANT CHANGE UP (ref 8.4–10.5)
CHLORIDE SERPL-SCNC: 105 MMOL/L — SIGNIFICANT CHANGE UP (ref 98–107)
CO2 SERPL-SCNC: 24 MMOL/L — SIGNIFICANT CHANGE UP (ref 22–31)
CREAT SERPL-MCNC: 0.96 MG/DL — SIGNIFICANT CHANGE UP (ref 0.5–1.3)
EGFR: 61 ML/MIN/1.73M2 — SIGNIFICANT CHANGE UP
GLUCOSE BLDC GLUCOMTR-MCNC: 178 MG/DL — HIGH (ref 70–99)
GLUCOSE BLDC GLUCOMTR-MCNC: 85 MG/DL — SIGNIFICANT CHANGE UP (ref 70–99)
GLUCOSE SERPL-MCNC: 122 MG/DL — HIGH (ref 70–99)
HCT VFR BLD CALC: 32.7 % — LOW (ref 34.5–45)
HGB BLD-MCNC: 11 G/DL — LOW (ref 11.5–15.5)
MAGNESIUM SERPL-MCNC: 1.9 MG/DL — SIGNIFICANT CHANGE UP (ref 1.6–2.6)
MCHC RBC-ENTMCNC: 29 PG — SIGNIFICANT CHANGE UP (ref 27–34)
MCHC RBC-ENTMCNC: 33.6 GM/DL — SIGNIFICANT CHANGE UP (ref 32–36)
MCV RBC AUTO: 86.3 FL — SIGNIFICANT CHANGE UP (ref 80–100)
METANEPHRINE, PL: 26.3 PG/ML — SIGNIFICANT CHANGE UP (ref 0–88)
NORMETANEPHRINE, PL: 48.9 PG/ML — SIGNIFICANT CHANGE UP (ref 0–285.2)
NRBC # BLD: 0 /100 WBCS — SIGNIFICANT CHANGE UP (ref 0–0)
NRBC # FLD: 0 K/UL — SIGNIFICANT CHANGE UP (ref 0–0)
PHOSPHATE SERPL-MCNC: 3.8 MG/DL — SIGNIFICANT CHANGE UP (ref 2.5–4.5)
PLATELET # BLD AUTO: 216 K/UL — SIGNIFICANT CHANGE UP (ref 150–400)
POTASSIUM SERPL-MCNC: 3.3 MMOL/L — LOW (ref 3.5–5.3)
POTASSIUM SERPL-SCNC: 3.3 MMOL/L — LOW (ref 3.5–5.3)
RBC # BLD: 3.79 M/UL — LOW (ref 3.8–5.2)
RBC # FLD: 12.8 % — SIGNIFICANT CHANGE UP (ref 10.3–14.5)
SODIUM SERPL-SCNC: 140 MMOL/L — SIGNIFICANT CHANGE UP (ref 135–145)
WBC # BLD: 8.24 K/UL — SIGNIFICANT CHANGE UP (ref 3.8–10.5)
WBC # FLD AUTO: 8.24 K/UL — SIGNIFICANT CHANGE UP (ref 3.8–10.5)

## 2022-12-31 PROCEDURE — 99239 HOSP IP/OBS DSCHRG MGMT >30: CPT

## 2022-12-31 RX ORDER — POTASSIUM CHLORIDE 20 MEQ
40 PACKET (EA) ORAL EVERY 4 HOURS
Refills: 0 | Status: COMPLETED | OUTPATIENT
Start: 2022-12-31 | End: 2022-12-31

## 2022-12-31 RX ORDER — INSULIN DETEMIR 100/ML (3)
44 INSULIN PEN (ML) SUBCUTANEOUS
Qty: 0 | Refills: 0 | DISCHARGE

## 2022-12-31 RX ORDER — INSULIN DETEMIR 100/ML (3)
46 INSULIN PEN (ML) SUBCUTANEOUS
Qty: 0 | Refills: 0 | DISCHARGE

## 2022-12-31 RX ADMIN — PANTOPRAZOLE SODIUM 40 MILLIGRAM(S): 20 TABLET, DELAYED RELEASE ORAL at 04:47

## 2022-12-31 RX ADMIN — ENOXAPARIN SODIUM 40 MILLIGRAM(S): 100 INJECTION SUBCUTANEOUS at 12:10

## 2022-12-31 RX ADMIN — Medication 60 MILLIGRAM(S): at 12:10

## 2022-12-31 RX ADMIN — Medication 2: at 13:07

## 2022-12-31 RX ADMIN — Medication 40 MILLIEQUIVALENT(S): at 09:17

## 2022-12-31 RX ADMIN — Medication 15 UNIT(S): at 13:07

## 2022-12-31 RX ADMIN — CARVEDILOL PHOSPHATE 25 MILLIGRAM(S): 80 CAPSULE, EXTENDED RELEASE ORAL at 04:46

## 2022-12-31 RX ADMIN — Medication 50 MICROGRAM(S): at 04:46

## 2022-12-31 RX ADMIN — LOSARTAN POTASSIUM 100 MILLIGRAM(S): 100 TABLET, FILM COATED ORAL at 04:47

## 2022-12-31 RX ADMIN — Medication 15 UNIT(S): at 09:17

## 2022-12-31 RX ADMIN — Medication 40 MILLIEQUIVALENT(S): at 14:43

## 2022-12-31 NOTE — PROGRESS NOTE ADULT - PROBLEM SELECTOR PROBLEM 1
Acute hypoxemic respiratory failure due to COVID-19
Acute hypoxemic respiratory failure due to COVID-19
Type 2 diabetes mellitus
Type 2 diabetes mellitus
Acute hypoxemic respiratory failure due to COVID-19
Type 2 diabetes mellitus
Acute hypoxemic respiratory failure due to COVID-19

## 2022-12-31 NOTE — PROGRESS NOTE ADULT - PROBLEM SELECTOR PLAN 7
- home med: metformin 1g BID, levemir 44U at bedtime  - A1C 6.5  - now w/ steroid induced hyperglycemia   - increased Lantus 56U bedtime  - increased Admelog to 22U TID pre-meals  - c/w mISS  - appreciate Endo f/up
- home med: atorvastatin 40mg daily   - c/w home med
- home med: metformin 1g BID, levemir 44U at bedtime  - A1C 6.5  - given steroids and anticipated hyperglycemia, will do weight based insulin and adjust as needed  - increase to lantus 48U bedtime  - increase to lispro 10 U TID pre-meals  - c/w mISS  - consider endo consult for steroid induced hyperglycemia
- home med: atorvastatin 40mg daily   - c/w home med
Pt with hx of hypothyroidism, c/w levothyroxine 50mcg
- home med: metformin 1g BID, levemir 44U at bedtime  - A1C 6.5  - given steroids and anticipated hyperglycemia, will do weight based insulin and adjust as needed  - c/w lantus 44U bedtime  - start lispro 7U TID pre-meals  - c/w mISS
- home med: metformin 1g BID, levemir 44U at bedtime  - A1C 6.5  - now w/ steroid induced hyperglycemia   - c/w Lantus 54U bedtime  - increased Admelog to 18 U TID pre-meals  - c/w mISS  - appreciate Endo f/up

## 2022-12-31 NOTE — PROGRESS NOTE ADULT - PROBLEM SELECTOR PROBLEM 3
HLD (hyperlipidemia)
Mediastinal mass
HLD (hyperlipidemia)
HLD (hyperlipidemia)
Mediastinal mass

## 2022-12-31 NOTE — PROGRESS NOTE ADULT - PROBLEM SELECTOR PROBLEM 2
HTN (hypertension)
Multifocal pneumonia
HTN (hypertension)
HTN (hypertension)
Multifocal pneumonia

## 2022-12-31 NOTE — PROGRESS NOTE ADULT - REASON FOR ADMISSION
SOB/ Large mediastinal mass

## 2022-12-31 NOTE — PROGRESS NOTE ADULT - ASSESSMENT
Pt is a 78 yo F with PMH HTN, HLD, T2D, and partial thyroidectomy p/t OSH with worsening weakness, SOB, hypoxia, and known COVID+ (urgent care 12/18). Found to have COVID with superimposed multifocal PNA and mediastinal mass. Transferred to [facility] for CT Sx evaluation.

## 2022-12-31 NOTE — PROGRESS NOTE ADULT - PROBLEM SELECTOR PLAN 5
- home meds: coreg 25mg BID, nifedipine 60mg BID, and telmisartan-HCTZ 80-12.5mg daily  - c/w coreg, losartan, and nifedipine 60mg BID  - monitor BP
Hx of HTN, on carvedilol 25mg BID, nifedipine 50mg BID, and telmisartan/hctz combi  - c/w carvedilol 25mg BID, will slowly titrate other medications given sepsis
- CTA chest with indeterminate 2.6cm L adrenal nodule  - outpt f/u
- CTA chest with indeterminate 2.6cm L adrenal nodule  - outpt f/u
- home meds: coreg 25mg BID, nifedipine 60mg BID, and telmisartan-HCTZ 80-12.5mg daily  - c/w coreg, losartan, and nifedipine 60mg BID  - monitor BP
- CTA chest with indeterminate 2.6cm L adrenal nodule  - outpt f/u
- home meds: coreg 25mg BID, nifedipine 60mg BID, and telmisartan-HCTZ 80-12.5mg daily  - c/w coreg   - c/w losartan   - restart remaining home meds PRN
- home meds: coreg 25mg BID, nifedipine 60mg BID, and telmisartan-HCTZ 80-12.5mg daily  - c/w coreg   - restart telmisartan today (therapeutic exchange with losartan)  - restart remaining home meds PRN

## 2022-12-31 NOTE — PROGRESS NOTE ADULT - PROBLEM SELECTOR PLAN 6
- home med: atorvastatin 40mg daily   - c/w home med
- home med: atorvastatin 40mg daily   - c/w home med
- home meds: coreg 25mg BID, nifedipine 60mg BID, and telmisartan-HCTZ 80-12.5mg daily  - c/w coreg and losartan   - restarted nifedipine 60mg BID  - monitor BP
- home meds: coreg 25mg BID, nifedipine 60mg BID, and telmisartan-HCTZ 80-12.5mg daily  - c/w coreg, losartan, and nifedipine 60mg BID  - monitor BP
- home meds: coreg 25mg BID, nifedipine 60mg BID, and telmisartan-HCTZ 80-12.5mg daily  - c/w coreg   - c/w losartan   - restarted nifedipine 60mg BID
- home meds: coreg 25mg BID, nifedipine 60mg BID, and telmisartan-HCTZ 80-12.5mg daily  - c/w coreg   - c/w losartan   - restarted nifedipine 60mg BID
- home meds: coreg 25mg BID, nifedipine 60mg BID, and telmisartan-HCTZ 80-12.5mg daily  - c/w coreg, losartan, and nifedipine 60mg BID  - monitor BP
Hx of DM, on metformin, levemir 44u at night  - Start Lantus 26u at bedtime with ISS
- home med: atorvastatin 40mg daily   - c/w home med
- home med: atorvastatin 40mg daily   - c/w home med

## 2022-12-31 NOTE — PROGRESS NOTE ADULT - PROBLEM SELECTOR PLAN 4
- CTA chest with indeterminate 2.6cm L adrenal nodule  - outpt f/u
Pt meeting sepsis criteria likely 2/2 COVID  - treatment as above
- CTA chest with indeterminate 2.6cm L adrenal nodule  - outpt f/u
Possibly COVID related   - check orthostatics
loose bowel movement   Last senna 2 days prior   Possibly due to abx or Covid infection  Solid BMs low suspicion for Cdiff  If persistent, would rule out C.diff
- CTA chest with indeterminate 2.6cm L adrenal nodule  - outpt f/u
Possibly COVID related. Resolved
- CTA chest with indeterminate 2.6cm L adrenal nodule  - outpt f/u
loose bowel movement   Last senna 2 days prior   Possibly due to abx or Covid infection  Solid BMs low suspicion for Cdiff
loose bowel movement   Last senna 2 days prior   Possibly due to abx or Covid infection  Solid BMs low suspicion for Cdiff  If persistent, would rule out C.diff

## 2022-12-31 NOTE — PROGRESS NOTE ADULT - PROBLEM SELECTOR PLAN 3
- CTA chest with large 7.8cm heterogenous structure with internal and peripheral calcification within the middle mediastinum splaying the trachea and esophagus, exerting mass effect on both; c/f mediastinal mass vs. vascular structure or aneurysm   - CT Sx consulted  - CT chest showed stable middle mediastinal mass, ectopic thyroid is a consideration  - Patient to follow up with CT surgery as outpatient to discuss further w/up and possible surgical plans
- CTA chest with large 7.8cm heterogenous structure with internal and peripheral calcification within the middle mediastinum splaying the trachea and esophagus, exerting mass effect on both; c/f mediastinal mass vs. vascular structure or aneurysm   - CT Sx consulted  - f/u CT chest with arterial and delayed phase imaging  - aspiration precautions
CT chest with large 7.8 cm heterogeneous structure with internal and peripheral calcification within the middle  mediastinum which splays the trachea and esophagus, exerting mass effect on both. Differential includes   mediastinal mass, however vascular structure/aneurysm cannot be excluded.   - Thoracic surgery following, appreciate recs  - CT chest without contrast with arterial and delayed phase imaging ordered for better evaluation  - Keep head of bed elevated 60 to 90 degrees  - Low threshold for ICU eval if pt with worsening respiratory status given mass
- CTA chest with large 7.8cm heterogenous structure with internal and peripheral calcification within the middle mediastinum splaying the trachea and esophagus, exerting mass effect on both; c/f mediastinal mass vs. vascular structure or aneurysm   - CT Sx consulted, f/u recs  - f/u CT non-con with arterial and delayed phase imaging  - aspiration precautions
- CTA chest with large 7.8cm heterogenous structure with internal and peripheral calcification within the middle mediastinum splaying the trachea and esophagus, exerting mass effect on both; c/f mediastinal mass vs. vascular structure or aneurysm   - CT Sx consulted  - CT chest showed stable middle mediastinal mass, ectopic thyroid is a consideration  - Patient to follow up with CT surgery as outpatient to discuss further w/up and possible surgical plans
- CTA chest with large 7.8cm heterogenous structure with internal and peripheral calcification within the middle mediastinum splaying the trachea and esophagus, exerting mass effect on both; c/f mediastinal mass vs. vascular structure or aneurysm   - CT Sx consulted, f/u recs  - f/u CT non-con with arterial and delayed phase imaging  - aspiration precautions

## 2022-12-31 NOTE — PROGRESS NOTE ADULT - PROBLEM SELECTOR PROBLEM 9
Thyroid, ectopic
Thyroid, ectopic
Nutrition, metabolism, and development symptoms
Thyroid, ectopic
Thyroid, ectopic
Nutrition, metabolism, and development symptoms
Thyroid, ectopic

## 2022-12-31 NOTE — PROGRESS NOTE ADULT - PROBLEM SELECTOR PLAN 8
- pt with hx ectopic thyroid s/p sternotomy with partial thyroidectomy; on synthroid 50mcg daily to "suppress" additional thyroid growth  - c/w synthroid
- home med: metformin 1g BID, levemir 44U at bedtime  - A1C 6.5  - course c/b steroid induced hyperglycemia   - c/w Lantus 58U bedtime  - c/w Admelog 22U TID pre-meals  - c/w mISS  - plan to discontinue Dexamethasone today and will notify Endo to adjust insulin dose
- pt with hx ectopic thyroid s/p sternotomy with partial thyroidectomy; on synthroid 50mcg daily to "suppress" additional thyroid growth  - c/w synthroid
- home med: metformin 1g BID, levemir 44U at bedtime  - A1C 6.5  - now w/ steroid induced hyperglycemia   - increased Lantus to 54U bedtime  - increased Admelog to 16 U TID pre-meals  - c/w mISS
- home med: metformin 1g BID, levemir 44U at bedtime  - A1C 6.5  - given steroids and anticipated hyperglycemia, will do weight based insulin and adjust as needed  - increase to lantus 48U bedtime  - increase to lispro 10 U TID pre-meals  - c/w mISS  - consider endo consult for steroid induced hyperglycemia
DVT: LMWH  Diet: DASH/carb consistent  Dispo: pending clinical improvement
- home med: metformin 1g BID, levemir 44U at bedtime  - A1C 6.5  - course c/b steroid induced hyperglycemia, Decadron discontinued   - Lantus decreased to 50U bedtime  - Admelog decreased to 15U TID pre-meals  - c/w mISS  - discharge regimen as per Endo
- pt with hx ectopic thyroid s/p sternotomy with partial thyroidectomy; on synthroid 50mcg daily to "suppress" additional thyroid growth  - c/w home med  - mediastinal findings as above, wonder if related to residual thyroid tissue / growing thyroid tissue apprecite CTX recs
- home med: metformin 1g BID, levemir 44U at bedtime  - A1C 6.5  - given steroids and anticipated hyperglycemia, will do weight based insulin and adjust as needed  - increase to lantus 54U bedtime  - increase to lispro 16 U TID pre-meals  - c/w mISS  - endo consult appreciated
- pt with hx ectopic thyroid s/p sternotomy with partial thyroidectomy; on synthroid 50mcg daily to "suppress" additional thyroid growth  - c/w home med  - mediastinal findings as above, wonder if related to residual thyroid tissue / growing thyroid tissue

## 2022-12-31 NOTE — PROGRESS NOTE ADULT - PROVIDER SPECIALTY LIST ADULT
Hospitalist
Hospitalist
Thoracic Surgery
Endocrinology
Hospitalist
Endocrinology
Hospitalist
Endocrinology
Hospitalist

## 2022-12-31 NOTE — PROGRESS NOTE ADULT - PROBLEM SELECTOR PLAN 10
- F: none  - E: replete K<4, Mg<2  - N: DASH/TLC, carb consistent  - D: lovenox 40mg q24h    code: full    DC planning in AM
- F: none  - E: replete K<4, Mg<2  - N: DASH/TLC, carb consistent  - D: lovenox 40mg q24h    code: full  dispo: pending medical optimization, anticipate return to home
- F: none  - E: replete K<4, Mg<2  - N: DASH/TLC, carb consistent  - D: lovenox 40mg q24h    code: full
- F: none  - E: replete K<4, Mg<2  - N: DASH/TLC, carb consistent  - D: lovenox 40mg q24h    code: full  dispo: pending medical optimization, anticipate return to home
- F: none  - E: replete K<4, Mg<2  - N: DASH/TLC, carb consistent  - D: lovenox 40mg q24h    code: full    DC home, d/c time 34 minutes

## 2022-12-31 NOTE — PROGRESS NOTE ADULT - PROBLEM SELECTOR PLAN 1
Dx with COVID 12/18, SOB started 12/20, presented on 12/21 with hypoxemia requiring uptitration to 6L NC and then conversion to BiPAP for increased work of breathing/dyspnea.  - Continue remdesivir x5 day course  - Continue dexamethasone 6 mg daily x 10 days  - Trial HFNC this morning  - Daily CMP while on remdesivir to monitor LFTs  - Ppx lovenox
Weakness, SOB, and hypoxia with known COVID+ 12/18 at [facility]; transferred from OSH for management of mediastinal mass  - acute hypoxic respiratory failure d/t COVID pneumonia, had required HFNC but now back on nasal cannula   - OSH CTA chest neg PE but with BL GGO and MF PNA as below  - COVID+ with procal 1.49  - s/p Remdesivir, can discontinue decadron today   - PRN duonebs  - weaned off O2 as tolerated, currently on RA. Will check ambulatory O2 sat
Weakness, SOB, and hypoxia with known COVID+ 12/18 at [facility]gent [facility]; transferred from OSH for management of mediastinal mass  - acute hypoxic respiratory failure d/t COVID pneumonia, had required HFNC but now back on nasal cannula   - OSH CTA chest neg PE but with BL GGO and MF PNA as below  - COVID+ with procal 1.49  - s/p Remdesivir, c/w decadron 6mg x10d  - PRN duonebs  - wean down O2 as tolerated
Weakness, SOB, and hypoxia with known COVID+ 12/18 at [facility]; transferred from OSH for management of mediastinal mass  - acute hypoxic respiratory failure d/t COVID pneumonia, had required HFNC but now back on 6L nc   - OSH CTA chest neg PE but with BL GGO and MF PNA as below  - COVID+ with procal 1.49  - s/p remdesivir, c/w decadron 6mg x10d  - PRN duonebs  - wean down O2 as tolerated
Weakness, SOB, and hypoxia with known COVID+ 12/18 at [facility]; transferred from OSH for management of mediastinal mass  - hypoxemic respiratory failure requiring 4-6L NC but has now progressed back to HFNC  - OSH CTA chest neg PE but with BL GGO and MF PNA as below  - COVID+ with procal 1.49  - c/w remdesivir x5d and decadron 6mg x10d  - protonix while on steroids  - PRN duonebs  - pt now on HFNC 60% 20L -- wean as tolerated   - consider pulm consult if unable to wean HiFlo
Weakness, SOB, and hypoxia with known COVID+ 12/18 at [facility]; transferred from OSH for management of mediastinal mass  - hypoxemix respiratory failure requiring 4-6L NC but with respiratory distress requiring intermittent BIPAP  - OSH CTA chest neg PE but with BL GGO and MF PNA as below  - COVID+ with procal 1.49  - c/w remdesivir x5d and decadron 6mg x10d  - protonix while on steroids  - PRN duonebs  - pt now on HFNC 60% 20L -- wean as tolerated   - TTE WNL  - continue to monitor
Weakness, SOB, and hypoxia with known COVID+ 12/18 at [facility]; transferred from OSH for management of mediastinal mass  - acute hypoxic respiratory failure d/t COVID pneumonia, had required HFNC but now back on nasal cannula   - OSH CTA chest neg PE but with BL GGO and MF PNA as below  - COVID+ with procal 1.49  - s/p Remdesivir, c/w decadron 6mg x10d  - PRN duonebs  - wean down O2 as tolerated, currently down to 3L
Weakness, SOB, and hypoxia with known COVID+ 12/18 at [facility]; transferred from OSH for management of mediastinal mass  - acute hypoxic respiratory failure d/t COVID pneumonia, had required HFNC but now back on nasal cannula   - OSH CTA chest neg PE but with BL GGO and MF PNA as below  - COVID+ with procal 1.49  - s/p Remdesivir, discontinued decadron   - PRN duonebs  - weaned off O2, currently on RA
Weakness, SOB, and hypoxia with known COVID+ 12/18 at [facility]; transferred from OSH for management of mediastinal mass  - hypoxemic respiratory failure requiring 4-6L NC but has now progressed back to HFNC  - OSH CTA chest neg PE but with BL GGO and MF PNA as below  - COVID+ with procal 1.49  - c/w remdesivir x5d and decadron 6mg x10d  - protonix while on steroids  - PRN duonebs  - pt now on HFNC 60% 20L -- wean as tolerated   - consider pulm consult if unable to wean HiFlo
- pt p/w worsening weakness, SOB, and hypoxia with known COVID+ 12/18 at [facility]; transferred from OSH for mediastinal mass, as below  - hypoxic at OSH and requiring BIPAP; improved hypoxia on arrival here on 4-6L NC but with respiratory distress requiring intermittent BIPAP  - OSH CTA chest neg PE but with BL GGO and MF PNA as below  - COVID+ with procal 1.49  - c/w remdesivir x5d and decadron 6mg x10d  - protonix while on steroids  - PRN duonebs  - pt now on HFNC 60% 20L -- wean as tolerated   - TTE WNL  - continue to monitor

## 2022-12-31 NOTE — PROGRESS NOTE ADULT - PROBLEM SELECTOR PLAN 9
- F: none  - E: replete K<4, Mg<2  - N: DASH/TLC, carb consistent  - D: lovenox 40mg q24h    code: full
- pt with hx ectopic thyroid s/p sternotomy with partial thyroidectomy; on synthroid 50mcg daily to "suppress" additional thyroid growth  - c/w home med  - mediastinal findings as above, wonder if related to residual thyroid tissue / growing thyroid tissue appreciate CTX recs
- pt with hx ectopic thyroid s/p sternotomy with partial thyroidectomy; on synthroid 50mcg daily to "suppress" additional thyroid growth  - c/w synthroid
- F: none  - E: replete K<4, Mg<2  - N: DASH/TLC, carb consistent  - D: lovenox 40mg q24h    code: full
- pt with hx ectopic thyroid s/p sternotomy with partial thyroidectomy; on synthroid 50mcg daily to "suppress" additional thyroid growth  - c/w home med  - mediastinal findings as above, wonder if related to residual thyroid tissue / growing thyroid tissue appreciate CTX recs
- pt with hx ectopic thyroid s/p sternotomy with partial thyroidectomy; on synthroid 50mcg daily to "suppress" additional thyroid growth  - c/w synthroid
- F: none  - E: replete K<4, Mg<2  - N: DASH/TLC, carb consistent  - D: lovenox 40mg q24h    code: full  dispo: pending medical optimization, anticipate return to home
- pt with hx ectopic thyroid s/p sternotomy with partial thyroidectomy; on synthroid 50mcg daily to "suppress" additional thyroid growth  - c/w synthroid
- F: none  - E: replete K<4, Mg<2  - N: DASH/TLC, carb consistent  - D: lovenox 40mg q24h    code: full  dispo: pending medical optimization, anticipate return to home

## 2022-12-31 NOTE — PROGRESS NOTE ADULT - SUBJECTIVE AND OBJECTIVE BOX
[facility] of Hospital Medicine  Alesia Zapata DO  Available on MS Teams  Pager: [phone]    Patient is a 77y old  Female who presents with a chief complaint of SOB/ Large mediastinal mass (22 Dec 2022 12:32)    Subjective:  Pt seen and examined at bedside in no acute distress, resting comfortably. Feels better since admission. Now on HFNC and finally feels like she can breathe well. Felt that some of the respiratory distress was due to the stretcher she was on earlier and frequent moving for room changes etc. 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 Understands that she has COVID and likely bacterial pneumonia. Is wondering if the mass we see on imaging is related to the thyroid tissue that was removed in the past (cause of sternotomy). Is agreeable with current plan. No other concerns or complaints. Understands that she will likely be in the hospital through the weekend.    REVIEW OF SYSTEMS:    CONSTITUTIONAL: No weakness, fevers or chills.   EYES/ENT: No visual changes;  No vertigo or throat pain   NECK: No pain or stiffness  RESPIRATORY: +intermittent SOB (improving), mild cough  CARDIOVASCULAR: No chest pain or palpitations  GASTROINTESTINAL: No abdominal or epigastric pain. No nausea, vomiting, or hematemesis; No diarrhea or constipation. No melena or hematochezia.  GENITOURINARY: No dysuria, frequency or hematuria  NEUROLOGICAL: No numbness or weakness  SKIN: No itching, burning, rashes, or lesions   All other review of systems is negative unless indicated above.    VITAL SIGNS:  T(C): 37.9 (12-22-22 @ 22:00), Max: 37.9 (12-22-22 @ 22:00)  T(F): 100.3 (12-22-22 @ 22:00), Max: 100.3 (12-22-22 @ 22:00)  HR: 90 (12-23-22 @ 07:33) (85 - 98)  BP: 148/65 (12-23-22 @ 06:00) (116/46 - 148/65)  BP(mean): --  RR: 17 (12-23-22 @ 07:33) (17 - 20)  SpO2: 97% (12-23-22 @ 07:33) (86% - 99%)  Wt(kg): --    PHYSICAL EXAM:  Constitutional: WDWN resting comfortably in bed; NAD; obese  Head: NC/AT  Eyes: PERRL, EOMI, anicteric sclera  ENT: no nasal discharge; MMM  Neck: supple; no JVD  Respiratory: decreased breath sounds throughout; decreased inspiratory effort (coughs with deep breaths), on HFNC 20L 60% without acc mm use, no conversational dyspnea, no distress  Cardiac: +S1/S2; RRR; no M/R/G  Gastrointestinal: soft, overly nourished; NT/ND; no rebound or guarding; +BSx4  Extremities: WWP, no clubbing or cyanosis; no peripheral edema  Musculoskeletal: NROM x4; no joint swelling, tenderness or erythema  Vascular: 2+ radial, DP/PT pulses B/L  Dermatologic: skin warm, dry and intact; no rashes, wounds, or scars  Neurologic: AAOx3; CNII-XII grossly intact; no focal deficits  Psychiatric: affect and characteristics of appearance, verbalizations, behaviors are appropriate    MEDICATIONS  (STANDING):  albuterol    90 MICROgram(s) HFA Inhaler 2 Puff(s) Inhalation every 3 hours  atorvastatin 40 milliGRAM(s) Oral at bedtime  azithromycin  IVPB      azithromycin  IVPB 500 milliGRAM(s) IV Intermittent every 24 hours  carvedilol 25 milliGRAM(s) Oral every 12 hours  cefTRIAXone   IVPB 1000 milliGRAM(s) IV Intermittent every 24 hours  dexAMETHasone  Injectable 6 milliGRAM(s) IV Push daily  dextrose 5%. 1000 milliLiter(s) (100 mL/Hr) IV Continuous <Continuous>  dextrose 5%. 1000 milliLiter(s) (50 mL/Hr) IV Continuous <Continuous>  dextrose 50% Injectable 25 Gram(s) IV Push once  dextrose 50% Injectable 12.5 Gram(s) IV Push once  dextrose 50% Injectable 25 Gram(s) IV Push once  enoxaparin Injectable 40 milliGRAM(s) SubCutaneous every 24 hours  glucagon  Injectable 1 milliGRAM(s) IntraMuscular once  insulin glargine Injectable (LANTUS) 44 Unit(s) SubCutaneous at bedtime  insulin lispro (ADMELOG) corrective regimen sliding scale   SubCutaneous three times a day before meals  insulin lispro (ADMELOG) corrective regimen sliding scale   SubCutaneous at bedtime  insulin lispro Injectable (ADMELOG) 7 Unit(s) SubCutaneous three times a day before meals  latanoprost 0.005% Ophthalmic Solution 1 Drop(s) Both EYES at bedtime  levothyroxine 50 MICROGram(s) Oral daily  remdesivir  IVPB 100 milliGRAM(s) IV Intermittent every 24 hours  remdesivir  IVPB   IV Intermittent     MEDICATIONS  (PRN):  acetaminophen     Tablet .. 650 milliGRAM(s) Oral every 6 hours PRN Temp greater or equal to 38C (100.4F), Mild Pain (1 - 3)  albuterol/ipratropium for Nebulization 3 milliLiter(s) Nebulizer every 6 hours PRN Shortness of Breath and/or Wheezing  dextrose Oral Gel 15 Gram(s) Oral once PRN Blood Glucose LESS THAN 70 milliGRAM(s)/deciliter  melatonin 3 milliGRAM(s) Oral at bedtime PRN Insomnia    LABS:                        10.9   8.44  )-----------( 181      ( 23 Dec 2022 06:41 )             31.5     12-23    133<L>  |  99  |  23  ----------------------------<  283<H>  4.5   |  27  |  1.03    Ca    8.9      23 Dec 2022 06:41  Phos  3.3     12-23  Mg     2.10     12-23    TPro  6.7  /  Alb  3.5  /  TBili  0.8  /  DBili  x   /  AST  13  /  ALT  8   /  AlkPhos  83  12-22    PT/INR - ( 22 Dec 2022 06:16 )   PT: 13.7 sec;   INR: 1.18 ratio         PTT - ( 22 Dec 2022 06:16 )  PTT:28.2 sec    CAPILLARY BLOOD GLUCOSE      POCT Blood Glucose.: 432 mg/dL (23 Dec 2022 12:38)      RADIOLOGY & ADDITIONAL TESTS: Reviewed.  
PROGRESS NOTE:     Patient is a 77y old  Female who presents with a chief complaint of Infection due to severe acute respiratory syndrome coronavirus 2 (SARS-CoV-2)  Pt is a 78 yo F with PMH HTN, HLD, T2D, and partial thyroidectomy p/t OSH with worsening weakness, SOB, hypoxia, and known COVID+ (urgent care 12/18). Found to have COVID with superimposed multifocal PNA and mediastinal mass. Transferred to [facility] for CT Sx evaluation.       (28 Dec 2022 11:32)      SUBJECTIVE / OVERNIGHT EVENTS: No new complaints.     ADDITIONAL REVIEW OF SYSTEMS:    MEDICATIONS  (STANDING):  albuterol    90 MICROgram(s) HFA Inhaler 2 Puff(s) Inhalation every 3 hours  atorvastatin 40 milliGRAM(s) Oral at bedtime  carvedilol 25 milliGRAM(s) Oral every 12 hours  dexAMETHasone  Injectable 6 milliGRAM(s) IV Push daily  dextrose 5%. 1000 milliLiter(s) (100 mL/Hr) IV Continuous <Continuous>  dextrose 5%. 1000 milliLiter(s) (50 mL/Hr) IV Continuous <Continuous>  dextrose 50% Injectable 25 Gram(s) IV Push once  dextrose 50% Injectable 12.5 Gram(s) IV Push once  dextrose 50% Injectable 25 Gram(s) IV Push once  enoxaparin Injectable 40 milliGRAM(s) SubCutaneous every 24 hours  glucagon  Injectable 1 milliGRAM(s) IntraMuscular once  insulin glargine Injectable (LANTUS) 54 Unit(s) SubCutaneous at bedtime  insulin lispro (ADMELOG) corrective regimen sliding scale   SubCutaneous three times a day before meals  insulin lispro (ADMELOG) corrective regimen sliding scale   SubCutaneous at bedtime  insulin lispro Injectable (ADMELOG) 18 Unit(s) SubCutaneous three times a day before meals  latanoprost 0.005% Ophthalmic Solution 1 Drop(s) Both EYES at bedtime  levothyroxine 50 MICROGram(s) Oral daily  losartan 100 milliGRAM(s) Oral daily  NIFEdipine XL 60 milliGRAM(s) Oral two times a day  pantoprazole    Tablet 40 milliGRAM(s) Oral before breakfast  potassium chloride    Tablet ER 40 milliEquivalent(s) Oral once    MEDICATIONS  (PRN):  acetaminophen     Tablet .. 650 milliGRAM(s) Oral every 6 hours PRN Temp greater or equal to 38C (100.4F), Mild Pain (1 - 3)  albuterol/ipratropium for Nebulization 3 milliLiter(s) Nebulizer every 6 hours PRN Shortness of Breath and/or Wheezing  dextrose Oral Gel 15 Gram(s) Oral once PRN Blood Glucose LESS THAN 70 milliGRAM(s)/deciliter  melatonin 3 milliGRAM(s) Oral at bedtime PRN Insomnia      CAPILLARY BLOOD GLUCOSE      POCT Blood Glucose.: 206 mg/dL (28 Dec 2022 08:27)  POCT Blood Glucose.: 186 mg/dL (27 Dec 2022 22:09)  POCT Blood Glucose.: 158 mg/dL (27 Dec 2022 18:17)  POCT Blood Glucose.: 205 mg/dL (27 Dec 2022 13:06)    I&O's Summary    27 Dec 2022 07:01  -  28 Dec 2022 07:00  --------------------------------------------------------  IN: 390 mL / OUT: 1600 mL / NET: -1210 mL        PHYSICAL EXAM:  Vital Signs Last 24 Hrs  T(C): 36.8 (28 Dec 2022 09:00), Max: 37.1 (27 Dec 2022 17:28)  T(F): 98.3 (28 Dec 2022 09:00), Max: 98.7 (27 Dec 2022 17:28)  HR: 68 (28 Dec 2022 09:00) (52 - 97)  BP: 138/70 (28 Dec 2022 09:00) (138/70 - 180/85)  BP(mean): --  RR: 18 (28 Dec 2022 09:00) (17 - 20)  SpO2: 100% (28 Dec 2022 09:00) (97% - 100%)    Parameters below as of 28 Dec 2022 09:00  Patient On (Oxygen Delivery Method): nasal cannula  O2 Flow (L/min): 5      CONSTITUTIONAL: NAD, well-developed  RESPIRATORY: Normal respiratory effort; lungs are clear to auscultation bilaterally  CARDIOVASCULAR: Regular rate and rhythm, normal S1 and S2, no murmur/rub/gallop; No lower extremity edema; Peripheral pulses are 2+ bilaterally  ABDOMEN: Nontender to palpation, normoactive bowel sounds, no rebound/guarding; No hepatosplenomegaly  MUSCLOSKELETAL: no clubbing or cyanosis of digits; no joint swelling or tenderness to palpation  PSYCH: A+O to person, place, and time; affect appropriate  NEURO: CN's intact, motor and sensation grossly intact in b/l UE and b/l LE    LABS:                        11.1   6.06  )-----------( 195      ( 28 Dec 2022 06:40 )             33.3     12-28    140  |  104  |  18  ----------------------------<  162<H>  3.4<L>   |  26  |  0.85    Ca    8.9      28 Dec 2022 06:40  Phos  4.0     12-28  Mg     1.80     12-28    TPro  6.0  /  Alb  3.0<L>  /  TBili  0.2  /  DBili  x   /  AST  13  /  ALT  14  /  AlkPhos  97  12-27                RADIOLOGY & ADDITIONAL TESTS:  Results Reviewed:   Imaging Personally Reviewed:  CT Chest:   No aortic aneurysm. Stable middle mediastinal mass discussed above.   Ectopic thyroid is a consideration. Consider nuclear medicine iodine scan   to further evaluate.    Electrocardiogram Personally Reviewed:    COORDINATION OF CARE:  Care Discussed with Consultants/Other Providers [Y/N]:  Prior or Outpatient Records Reviewed [Y/N]:  
PROGRESS NOTE:     Patient is a 77y old  Female who presents with a chief complaint of SOB/ Large mediastinal mass (29 Dec 2022 15:40)      SUBJECTIVE / OVERNIGHT EVENTS: pt off oxygen. She is c/o dizziness.     ADDITIONAL REVIEW OF SYSTEMS:    MEDICATIONS  (STANDING):  albuterol    90 MICROgram(s) HFA Inhaler 2 Puff(s) Inhalation every 3 hours  atorvastatin 40 milliGRAM(s) Oral at bedtime  carvedilol 25 milliGRAM(s) Oral every 12 hours  dexAMETHasone  Injectable 6 milliGRAM(s) IV Push daily  dextrose 5%. 1000 milliLiter(s) (50 mL/Hr) IV Continuous <Continuous>  dextrose 5%. 1000 milliLiter(s) (100 mL/Hr) IV Continuous <Continuous>  dextrose 50% Injectable 25 Gram(s) IV Push once  dextrose 50% Injectable 25 Gram(s) IV Push once  dextrose 50% Injectable 12.5 Gram(s) IV Push once  enoxaparin Injectable 40 milliGRAM(s) SubCutaneous every 24 hours  glucagon  Injectable 1 milliGRAM(s) IntraMuscular once  insulin glargine Injectable (LANTUS) 58 Unit(s) SubCutaneous at bedtime  insulin lispro (ADMELOG) corrective regimen sliding scale   SubCutaneous three times a day before meals  insulin lispro (ADMELOG) corrective regimen sliding scale   SubCutaneous at bedtime  insulin lispro Injectable (ADMELOG) 22 Unit(s) SubCutaneous three times a day before meals  latanoprost 0.005% Ophthalmic Solution 1 Drop(s) Both EYES at bedtime  levothyroxine 50 MICROGram(s) Oral daily  losartan 100 milliGRAM(s) Oral daily  NIFEdipine XL 60 milliGRAM(s) Oral two times a day  pantoprazole    Tablet 40 milliGRAM(s) Oral before breakfast    MEDICATIONS  (PRN):  acetaminophen     Tablet .. 650 milliGRAM(s) Oral every 6 hours PRN Temp greater or equal to 38C (100.4F), Mild Pain (1 - 3)  albuterol/ipratropium for Nebulization 3 milliLiter(s) Nebulizer every 6 hours PRN Shortness of Breath and/or Wheezing  dextrose Oral Gel 15 Gram(s) Oral once PRN Blood Glucose LESS THAN 70 milliGRAM(s)/deciliter  melatonin 3 milliGRAM(s) Oral at bedtime PRN Insomnia      CAPILLARY BLOOD GLUCOSE      POCT Blood Glucose.: 176 mg/dL (30 Dec 2022 12:37)  POCT Blood Glucose.: 100 mg/dL (30 Dec 2022 08:49)  POCT Blood Glucose.: 302 mg/dL (29 Dec 2022 23:05)  POCT Blood Glucose.: 172 mg/dL (29 Dec 2022 17:18)    I&O's Summary    29 Dec 2022 07:01  -  30 Dec 2022 07:00  --------------------------------------------------------  IN: 440 mL / OUT: 2000 mL / NET: -1560 mL        PHYSICAL EXAM:  Vital Signs Last 24 Hrs  T(C): 36.6 (30 Dec 2022 11:10), Max: 36.9 (29 Dec 2022 17:10)  T(F): 97.9 (30 Dec 2022 11:10), Max: 98.5 (29 Dec 2022 17:10)  HR: 58 (30 Dec 2022 11:10) (51 - 58)  BP: 136/57 (30 Dec 2022 11:10) (127/50 - 151/68)  BP(mean): --  RR: 18 (30 Dec 2022 11:10) (18 - 18)  SpO2: 97% (30 Dec 2022 11:10) (97% - 100%)    Parameters below as of 30 Dec 2022 11:10  Patient On (Oxygen Delivery Method): room air      CONSTITUTIONAL: NAD, well-developed  RESPIRATORY: Normal respiratory effort; lungs are clear to auscultation bilaterally  CARDIOVASCULAR: Regular rate and rhythm, normal S1 and S2, no murmur/rub/gallop; No lower extremity edema; Peripheral pulses are 2+ bilaterally  ABDOMEN: Nontender to palpation, normoactive bowel sounds, no rebound/guarding; No hepatosplenomegaly  MUSCLOSKELETAL: no clubbing or cyanosis of digits; no joint swelling or tenderness to palpation  PSYCH: A+O to person, place, and time; affect appropriate  NEURO: CN's intact, motor and sensation grossly intact in b/l UE and b/l LE    LABS:                        10.6   6.10  )-----------( 199      ( 30 Dec 2022 06:16 )             31.6     12-30    141  |  106  |  16  ----------------------------<  83  3.6   |  28  |  0.81    Ca    8.7      30 Dec 2022 06:16  Phos  3.5     12-30  Mg     1.80     12-30                  RADIOLOGY & ADDITIONAL TESTS:  Results Reviewed:   Imaging Personally Reviewed:  Electrocardiogram Personally Reviewed:    COORDINATION OF CARE:  Care Discussed with Consultants/Other Providers [Y/N]:  Prior or Outpatient Records Reviewed [Y/N]:  
Pt resting in bed, on hiflow      Vital Signs:  Vital Signs Last 24 Hrs  T(C): 36.7 (12-25-22 @ 07:31), Max: 37.3 (12-24-22 @ 22:15)  T(F): 98.1 (12-25-22 @ 07:31), Max: 99.1 (12-24-22 @ 22:15)  HR: 63 (12-25-22 @ 07:38) (63 - 80)  BP: 189/68 (12-25-22 @ 07:31) (150/70 - 190/80)  RR: 23 (12-25-22 @ 07:38) (18 - 23)  SpO2: 100% (12-25-22 @ 07:38) (100% - 100%) on (O2)    Telemetry/Alarms:    Relevant labs, radiology and Medications reviewed                        11.4   8.23  )-----------( 204      ( 25 Dec 2022 07:00 )             34.1     12-25    138  |  102  |  21  ----------------------------<  252<H>  3.7   |  28  |  0.79    Ca    9.0      25 Dec 2022 07:00  Phos  3.1     12-25  Mg     2.00     12-25    TPro  6.5  /  Alb  3.0<L>  /  TBili  0.3  /  DBili  x   /  AST  14  /  ALT  16  /  AlkPhos  103  12-25      MEDICATIONS  (STANDING):  albuterol    90 MICROgram(s) HFA Inhaler 2 Puff(s) Inhalation every 3 hours  atorvastatin 40 milliGRAM(s) Oral at bedtime  azithromycin  IVPB      azithromycin  IVPB 500 milliGRAM(s) IV Intermittent every 24 hours  carvedilol 25 milliGRAM(s) Oral every 12 hours  cefTRIAXone   IVPB 1000 milliGRAM(s) IV Intermittent every 24 hours  dexAMETHasone  Injectable 6 milliGRAM(s) IV Push daily  dextrose 5%. 1000 milliLiter(s) (100 mL/Hr) IV Continuous <Continuous>  dextrose 5%. 1000 milliLiter(s) (50 mL/Hr) IV Continuous <Continuous>  dextrose 50% Injectable 25 Gram(s) IV Push once  dextrose 50% Injectable 12.5 Gram(s) IV Push once  dextrose 50% Injectable 25 Gram(s) IV Push once  enoxaparin Injectable 40 milliGRAM(s) SubCutaneous every 24 hours  glucagon  Injectable 1 milliGRAM(s) IntraMuscular once  insulin glargine Injectable (LANTUS) 48 Unit(s) SubCutaneous at bedtime  insulin lispro (ADMELOG) corrective regimen sliding scale   SubCutaneous three times a day before meals  insulin lispro (ADMELOG) corrective regimen sliding scale   SubCutaneous at bedtime  insulin lispro Injectable (ADMELOG) 7 Unit(s) SubCutaneous three times a day before meals  latanoprost 0.005% Ophthalmic Solution 1 Drop(s) Both EYES at bedtime  levothyroxine 50 MICROGram(s) Oral daily  losartan 100 milliGRAM(s) Oral daily  NIFEdipine XL 60 milliGRAM(s) Oral two times a day  pantoprazole    Tablet 40 milliGRAM(s) Oral before breakfast  remdesivir  IVPB 100 milliGRAM(s) IV Intermittent every 24 hours  remdesivir  IVPB   IV Intermittent     MEDICATIONS  (PRN):  acetaminophen     Tablet .. 650 milliGRAM(s) Oral every 6 hours PRN Temp greater or equal to 38C (100.4F), Mild Pain (1 - 3)  albuterol/ipratropium for Nebulization 3 milliLiter(s) Nebulizer every 6 hours PRN Shortness of Breath and/or Wheezing  dextrose Oral Gel 15 Gram(s) Oral once PRN Blood Glucose LESS THAN 70 milliGRAM(s)/deciliter  melatonin 3 milliGRAM(s) Oral at bedtime PRN Insomnia      Social:  Denies Smoking, Drinking illict drug use    I&O's Summary    24 Dec 2022 07:01  -  25 Dec 2022 07:00  --------------------------------------------------------  IN: 0 mL / OUT: 600 mL / NET: -600 mL        Assessment  Pt with COVID, rx's w/ paxlovid, now on Remdesivir, on BIPAP found to have 7.8 cm heterogeneous structure w/i middle mediastinum which splats trachea and esophagus.  Pt is s/p sternotomy and partial thyroidectomy 30 yrs ago. Repeat CT Chest w/ contrast pending. Will follow. Cont care per primary team  
Chief Complaint: Poorly controlled T2DM with hyperglycemia    History: Pt seen at bedside. Pt tolerating oral diet. Pt denies nausea and vomiting/any signs of hypoglycemia. Pt reports an adequate appetite.     MEDICATIONS  (STANDING):  albuterol    90 MICROgram(s) HFA Inhaler 2 Puff(s) Inhalation every 3 hours  atorvastatin 40 milliGRAM(s) Oral at bedtime  carvedilol 25 milliGRAM(s) Oral every 12 hours  dexAMETHasone  Injectable 6 milliGRAM(s) IV Push daily  dextrose 5%. 1000 milliLiter(s) (50 mL/Hr) IV Continuous <Continuous>  dextrose 5%. 1000 milliLiter(s) (100 mL/Hr) IV Continuous <Continuous>  dextrose 50% Injectable 25 Gram(s) IV Push once  dextrose 50% Injectable 12.5 Gram(s) IV Push once  dextrose 50% Injectable 25 Gram(s) IV Push once  enoxaparin Injectable 40 milliGRAM(s) SubCutaneous every 24 hours  glucagon  Injectable 1 milliGRAM(s) IntraMuscular once  insulin glargine Injectable (LANTUS) 54 Unit(s) SubCutaneous at bedtime  insulin lispro (ADMELOG) corrective regimen sliding scale   SubCutaneous three times a day before meals  insulin lispro (ADMELOG) corrective regimen sliding scale   SubCutaneous at bedtime  insulin lispro Injectable (ADMELOG) 16 Unit(s) SubCutaneous three times a day before meals  latanoprost 0.005% Ophthalmic Solution 1 Drop(s) Both EYES at bedtime  levothyroxine 50 MICROGram(s) Oral daily  losartan 100 milliGRAM(s) Oral daily  NIFEdipine XL 60 milliGRAM(s) Oral two times a day  pantoprazole    Tablet 40 milliGRAM(s) Oral before breakfast    MEDICATIONS  (PRN):  acetaminophen     Tablet .. 650 milliGRAM(s) Oral every 6 hours PRN Temp greater or equal to 38C (100.4F), Mild Pain (1 - 3)  albuterol/ipratropium for Nebulization 3 milliLiter(s) Nebulizer every 6 hours PRN Shortness of Breath and/or Wheezing  dextrose Oral Gel 15 Gram(s) Oral once PRN Blood Glucose LESS THAN 70 milliGRAM(s)/deciliter  melatonin 3 milliGRAM(s) Oral at bedtime PRN Insomnia      Allergies: No Known Allergies      Review of Systems:  Respiratory: No SOB, no cough  GI: No nausea, vomiting, abdominal pain  Endocrine: no polyuria, polydipsia    PHYSICAL EXAM:  VITALS: T(C): 36.6 (12-27-22 @ 12:36)  T(F): 97.8 (12-27-22 @ 12:36), Max: 98.5 (12-27-22 @ 02:55)  HR: 97 (12-27-22 @ 12:36) (55 - 97)  BP: 149/87 (12-27-22 @ 12:36) (132/63 - 155/67)  RR:  (18 - 20)  SpO2:  (97% - 100%)  Wt(kg): --  GENERAL: NAD, well-groomed, well-developed  RESPIRATORY: No labored breathing   GI: Soft, nontender, non distended  PSYCH: Alert and oriented x 3, normal affect, normal mood      CAPILLARY BLOOD GLUCOSE  POCT Blood Glucose.: 205 mg/dL (27 Dec 2022 13:06)  POCT Blood Glucose.: 184 mg/dL (27 Dec 2022 08:40)  POCT Blood Glucose.: 241 mg/dL (26 Dec 2022 22:09)  POCT Blood Glucose.: 214 mg/dL (26 Dec 2022 17:06)    A1C with Estimated Average Glucose (12.22.22 @ 06:16)    A1C with Estimated Average Glucose Result: 6.5    Estimated Average Glucose: 140      12-27    141  |  105  |  18  ----------------------------<  162<H>  3.3<L>   |  25  |  0.87    eGFR: 69    Ca    8.8      12-27  Mg     1.80     12-27  Phos  2.9     12-27    TPro  6.0  /  Alb  3.0<L>  /  TBili  0.2  /  DBili  x   /  AST  13  /  ALT  14  /  AlkPhos  97  12-27      Thyroid Function Tests:  12-26 @ 07:02 TSH 0.66 FreeT4 1.4 T3 -- Anti TPO -- Anti Thyroglobulin Ab -- TSI --      Diet, DASH/TLC:   Sodium & Cholesterol Restricted  Consistent Carbohydrate No Snacks (CSTCHO) (12-21-22 @ 23:51) [Active]                      
Evaluating pt with mediastinal mass  pt remains on high flow nasal cannula  CT chest with contrast ordered    Vital Signs Last 24 Hrs  T(C): 36.7 (26 Dec 2022 11:01), Max: 36.8 (25 Dec 2022 21:03)  T(F): 98.1 (26 Dec 2022 11:01), Max: 98.3 (25 Dec 2022 21:03)  HR: 69 (26 Dec 2022 11:01) (58 - 70)  BP: 145/63 (26 Dec 2022 11:01) (141/60 - 173/76)  BP(mean): --  RR: 18 (26 Dec 2022 11:01) (18 - 20)  SpO2: 96% (26 Dec 2022 11:01) (95% - 100%)  Parameters below as of 26 Dec 2022 11:01  Patient On (Oxygen Delivery Method): nasal cannula, high flow  O2 Flow (L/min): 20  O2 Concentration (%): 60      General: WN/WD NAD  Neurology: A&Ox3, nonfocal, MICHEL x 4  Respiratory: CTA B/L  CV: RRR, S1S2, no murmurs, rubs or gallops  Abdominal: Soft, NT, ND +BS, Last BM  Extremities: No edema, + peripheral pulses                                     11.4   7.50  )-----------( 198      ( 26 Dec 2022 07:02 )             34.2     12-26    141  |  104  |  18  ----------------------------<  251<H>  3.2<L>   |  27  |  0.84    Ca    8.5      26 Dec 2022 07:02  Phos  2.9     12-26  Mg     1.90     12-26    TPro  6.2  /  Alb  2.9<L>  /  TBili  0.2  /  DBili  x   /  AST  9   /  ALT  16  /  AlkPhos  99  12-26      MEDICATIONS  (STANDING):  albuterol    90 MICROgram(s) HFA Inhaler 2 Puff(s) Inhalation every 3 hours  atorvastatin 40 milliGRAM(s) Oral at bedtime  azithromycin  IVPB      azithromycin  IVPB 500 milliGRAM(s) IV Intermittent every 24 hours  carvedilol 25 milliGRAM(s) Oral every 12 hours  dexAMETHasone  Injectable 6 milliGRAM(s) IV Push daily  dextrose 5%. 1000 milliLiter(s) (100 mL/Hr) IV Continuous <Continuous>  dextrose 5%. 1000 milliLiter(s) (50 mL/Hr) IV Continuous <Continuous>  dextrose 50% Injectable 25 Gram(s) IV Push once  dextrose 50% Injectable 12.5 Gram(s) IV Push once  dextrose 50% Injectable 25 Gram(s) IV Push once  enoxaparin Injectable 40 milliGRAM(s) SubCutaneous every 24 hours  glucagon  Injectable 1 milliGRAM(s) IntraMuscular once  insulin glargine Injectable (LANTUS) 54 Unit(s) SubCutaneous at bedtime  insulin lispro (ADMELOG) corrective regimen sliding scale   SubCutaneous three times a day before meals  insulin lispro (ADMELOG) corrective regimen sliding scale   SubCutaneous at bedtime  insulin lispro Injectable (ADMELOG) 16 Unit(s) SubCutaneous three times a day before meals  latanoprost 0.005% Ophthalmic Solution 1 Drop(s) Both EYES at bedtime  levothyroxine 50 MICROGram(s) Oral daily  losartan 100 milliGRAM(s) Oral daily  NIFEdipine XL 60 milliGRAM(s) Oral two times a day  pantoprazole    Tablet 40 milliGRAM(s) Oral before breakfast    MEDICATIONS  (PRN):  acetaminophen     Tablet .. 650 milliGRAM(s) Oral every 6 hours PRN Temp greater or equal to 38C (100.4F), Mild Pain (1 - 3)  albuterol/ipratropium for Nebulization 3 milliLiter(s) Nebulizer every 6 hours PRN Shortness of Breath and/or Wheezing  dextrose Oral Gel 15 Gram(s) Oral once PRN Blood Glucose LESS THAN 70 milliGRAM(s)/deciliter  melatonin 3 milliGRAM(s) Oral at bedtime PRN Insomnia    Pertinent Physical Exam  I&O's Summary    25 Dec 2022 07:01  -  26 Dec 2022 07:00  --------------------------------------------------------  IN: 240 mL / OUT: 900 mL / NET: -660 mL      ASSESSMENT  Pt is s/p sternotomy and partial thyroidectomy 30 yrs ago and is admitted with COVID+ now on Remdesivir, transferred from [facility] pt with worsening dyspnea requiring BiPAP. CTA PE done which was negative for PE but showed large mediastinal mass with mass effect on trachea and esophagus.     PLAN  Repeat CT Chest w/ contrast pending. Will follow.   Cont care per primary team
PROGRESS NOTE:   Authoted by Dr. Luis F Metz MD, DEVIN  Pager [phone] [facility]     Patient is a 77y old  Female who presents with a chief complaint of SOB/ Large mediastinal mass (25 Dec 2022 12:30)    SUBJECTIVE / OVERNIGHT EVENTS:  Hypoxic overnight requiring resumption of HFNC, attributes this to excessive bowel movements and transferring from the bed to commode that made her increasingly short of breath.   Elevated BP overnight and this morning.   The patient has no other subjective complaints.   ADDITIONAL REVIEW OF SYSTEMS:    MEDICATIONS  (STANDING):  albuterol    90 MICROgram(s) HFA Inhaler 2 Puff(s) Inhalation every 3 hours  atorvastatin 40 milliGRAM(s) Oral at bedtime  azithromycin  IVPB      azithromycin  IVPB 500 milliGRAM(s) IV Intermittent every 24 hours  carvedilol 25 milliGRAM(s) Oral every 12 hours  cefTRIAXone   IVPB 1000 milliGRAM(s) IV Intermittent every 24 hours  dexAMETHasone  Injectable 6 milliGRAM(s) IV Push daily  dextrose 5%. 1000 milliLiter(s) (100 mL/Hr) IV Continuous <Continuous>  dextrose 5%. 1000 milliLiter(s) (50 mL/Hr) IV Continuous <Continuous>  dextrose 50% Injectable 25 Gram(s) IV Push once  dextrose 50% Injectable 12.5 Gram(s) IV Push once  dextrose 50% Injectable 25 Gram(s) IV Push once  enoxaparin Injectable 40 milliGRAM(s) SubCutaneous every 24 hours  glucagon  Injectable 1 milliGRAM(s) IntraMuscular once  insulin glargine Injectable (LANTUS) 48 Unit(s) SubCutaneous at bedtime  insulin lispro (ADMELOG) corrective regimen sliding scale   SubCutaneous three times a day before meals  insulin lispro (ADMELOG) corrective regimen sliding scale   SubCutaneous at bedtime  insulin lispro Injectable (ADMELOG) 7 Unit(s) SubCutaneous three times a day before meals  latanoprost 0.005% Ophthalmic Solution 1 Drop(s) Both EYES at bedtime  levothyroxine 50 MICROGram(s) Oral daily  losartan 100 milliGRAM(s) Oral daily  NIFEdipine XL 60 milliGRAM(s) Oral two times a day  pantoprazole    Tablet 40 milliGRAM(s) Oral before breakfast  remdesivir  IVPB 100 milliGRAM(s) IV Intermittent every 24 hours  remdesivir  IVPB   IV Intermittent     MEDICATIONS  (PRN):  acetaminophen     Tablet .. 650 milliGRAM(s) Oral every 6 hours PRN Temp greater or equal to 38C (100.4F), Mild Pain (1 - 3)  albuterol/ipratropium for Nebulization 3 milliLiter(s) Nebulizer every 6 hours PRN Shortness of Breath and/or Wheezing  dextrose Oral Gel 15 Gram(s) Oral once PRN Blood Glucose LESS THAN 70 milliGRAM(s)/deciliter  melatonin 3 milliGRAM(s) Oral at bedtime PRN Insomnia    OBJECTIVE:  Vital Signs Last 24 Hrs  T(C): 36.8 (25 Dec 2022 13:36), Max: 37.3 (24 Dec 2022 22:15)  T(F): 98.2 (25 Dec 2022 13:36), Max: 99.1 (24 Dec 2022 22:15)  HR: 64 (25 Dec 2022 13:36) (63 - 80)  BP: 188/75 (25 Dec 2022 13:36) (150/70 - 190/80)  BP(mean): 106 (25 Dec 2022 05:15) (106 - 106)  RR: 20 (25 Dec 2022 13:36) (18 - 23)  SpO2: 100% (25 Dec 2022 13:36) (100% - 100%)    Parameters below as of 25 Dec 2022 13:36  Patient On (Oxygen Delivery Method): nasal cannula, high flow  O2 Flow (L/min): 20  O2 Concentration (%): 45  I&O's Summary    24 Dec 2022 07:01  -  25 Dec 2022 07:00  --------------------------------------------------------  IN: 0 mL / OUT: 600 mL / NET: -600 mL    CONSTITUTIONAL: NAD, well-developed  HEAD:  Atraumatic, Normocephalic  EYES: EOMI, PERRLA, conjunctiva and sclera clear  ENMT: No tonsillar erythema, exudates, or enlargement; Moist mucous membranes, Good dentition  NECK: Supple, No JVD  NERVOUS SYSTEM: AOX3, motor and sensation grossly intact in b/l UE and b/l LE  PSYCHIATRIC: Appropriate affect and mood  CHEST/LUNG: Clear to auscultation bilaterally; No rales, rhonchi, wheezing, or rubs  HEART: Regular rate and rhythm; No murmurs, rubs, or gallops. No LE edema  ABDOMEN: Soft, Nontender, Nondistended; Bowel sounds present  EXTREMITIES:  2+ Peripheral Pulses, No clubbing, cyanosis  SKIN: No rashes or lesions    LABS:                        11.4   8.23  )-----------( 204      ( 25 Dec 2022 07:00 )             34.1     12-25    138  |  102  |  21  ----------------------------<  252<H>  3.7   |  28  |  0.79    Ca    9.0      25 Dec 2022 07:00  Phos  3.1     12-25  Mg     2.00     12-25    TPro  6.5  /  Alb  3.0<L>  /  TBili  0.3  /  DBili  x   /  AST  14  /  ALT  16  /  AlkPhos  103  12-25    CAPILLARY BLOOD GLUCOSE  POCT Blood Glucose.: 273 mg/dL (25 Dec 2022 12:46)  POCT Blood Glucose.: 262 mg/dL (25 Dec 2022 09:04)  POCT Blood Glucose.: 241 mg/dL (25 Dec 2022 08:25)  POCT Blood Glucose.: 202 mg/dL (24 Dec 2022 21:31)  POCT Blood Glucose.: 265 mg/dL (24 Dec 2022 17:18)    RADIOLOGY & ADDITIONAL TESTS:  Results Reviewed:   Imaging Personally Reviewed:  Electrocardiogram Personally Reviewed:    COORDINATION OF CARE:  Care Discussed with Consultants/Other Providers [Y/N]:  Prior or Outpatient Records Reviewed [Y/N]:  
Pt seen at bedside breathing comfortsbly on BIPAP, but admits to CARVAJAL.    Pt with COVID, found to have 7.8 cm heterogeneous structure w/i middle mediastinum which splats trachea and esophagus.  Pt is s/p sternotomy and partial thyroidectomy 30 yrs ago.    ICU Vital Signs Last 24 Hrs  T(C): 37.1 (24 Dec 2022 06:20), Max: 37.2 (23 Dec 2022 14:17)  T(F): 98.7 (24 Dec 2022 06:20), Max: 98.9 (23 Dec 2022 14:17)  HR: 90 (24 Dec 2022 08:00) (76 - 95)  BP: 147/68 (24 Dec 2022 06:20) (142/57 - 148/66)  BP(mean): --  ABP: --  ABP(mean): --  RR: 18 (24 Dec 2022 08:00) (16 - 18)  SpO2: 99% (24 Dec 2022 08:00) (97% - 100%)    O2 Parameters below as of 24 Dec 2022 08:00  Patient On (Oxygen Delivery Method): nasal cannula, high flow  O2 Flow (L/min): 20  O2 Concentration (%): 60    MEDICATIONS  (STANDING):  albuterol    90 MICROgram(s) HFA Inhaler 2 Puff(s) Inhalation every 3 hours  atorvastatin 40 milliGRAM(s) Oral at bedtime  azithromycin  IVPB      azithromycin  IVPB 500 milliGRAM(s) IV Intermittent every 24 hours  carvedilol 25 milliGRAM(s) Oral every 12 hours  cefTRIAXone   IVPB 1000 milliGRAM(s) IV Intermittent every 24 hours  dexAMETHasone  Injectable 6 milliGRAM(s) IV Push daily  dextrose 5%. 1000 milliLiter(s) (100 mL/Hr) IV Continuous <Continuous>  dextrose 5%. 1000 milliLiter(s) (50 mL/Hr) IV Continuous <Continuous>  dextrose 50% Injectable 25 Gram(s) IV Push once  dextrose 50% Injectable 12.5 Gram(s) IV Push once  dextrose 50% Injectable 25 Gram(s) IV Push once  enoxaparin Injectable 40 milliGRAM(s) SubCutaneous every 24 hours  glucagon  Injectable 1 milliGRAM(s) IntraMuscular once  insulin glargine Injectable (LANTUS) 44 Unit(s) SubCutaneous at bedtime  insulin lispro (ADMELOG) corrective regimen sliding scale   SubCutaneous three times a day before meals  insulin lispro (ADMELOG) corrective regimen sliding scale   SubCutaneous at bedtime  insulin lispro Injectable (ADMELOG) 7 Unit(s) SubCutaneous three times a day before meals  latanoprost 0.005% Ophthalmic Solution 1 Drop(s) Both EYES at bedtime  levothyroxine 50 MICROGram(s) Oral daily  losartan 100 milliGRAM(s) Oral daily  pantoprazole    Tablet 40 milliGRAM(s) Oral before breakfast  remdesivir  IVPB 100 milliGRAM(s) IV Intermittent every 24 hours  remdesivir  IVPB   IV Intermittent     MEDICATIONS  (PRN):  acetaminophen     Tablet .. 650 milliGRAM(s) Oral every 6 hours PRN Temp greater or equal to 38C (100.4F), Mild Pain (1 - 3)  albuterol/ipratropium for Nebulization 3 milliLiter(s) Nebulizer every 6 hours PRN Shortness of Breath and/or Wheezing  dextrose Oral Gel 15 Gram(s) Oral once PRN Blood Glucose LESS THAN 70 milliGRAM(s)/deciliter  melatonin 3 milliGRAM(s) Oral at bedtime PRN Insomnia                            12.0   10.80 )-----------( 206      ( 24 Dec 2022 06:44 )             35.5   12-24    140  |  103  |  23  ----------------------------<  218<H>  4.2   |  27  |  0.88    Ca    9.5      24 Dec 2022 06:44  Phos  3.6     12-24  Mg     2.20     12-24    TPro  7.3  /  Alb  3.6  /  TBili  0.4  /  DBili  x   /  AST  13  /  ALT  14  /  AlkPhos  116  12-24  
Chief Complaint: Poorly controlled T2DM with hyperglycemia    History: Pt seen at bedside. Pt tolerating oral diet. Pt denies nausea and vomiting/any signs of hypoglycemia. Pt reports an adequate appetite     MEDICATIONS  (STANDING):  albuterol    90 MICROgram(s) HFA Inhaler 2 Puff(s) Inhalation every 3 hours  atorvastatin 40 milliGRAM(s) Oral at bedtime  carvedilol 25 milliGRAM(s) Oral every 12 hours  dexAMETHasone  Injectable 6 milliGRAM(s) IV Push daily  dextrose 5%. 1000 milliLiter(s) (50 mL/Hr) IV Continuous <Continuous>  dextrose 5%. 1000 milliLiter(s) (100 mL/Hr) IV Continuous <Continuous>  dextrose 50% Injectable 25 Gram(s) IV Push once  dextrose 50% Injectable 12.5 Gram(s) IV Push once  dextrose 50% Injectable 25 Gram(s) IV Push once  enoxaparin Injectable 40 milliGRAM(s) SubCutaneous every 24 hours  glucagon  Injectable 1 milliGRAM(s) IntraMuscular once  insulin glargine Injectable (LANTUS) 56 Unit(s) SubCutaneous at bedtime  insulin lispro (ADMELOG) corrective regimen sliding scale   SubCutaneous three times a day before meals  insulin lispro (ADMELOG) corrective regimen sliding scale   SubCutaneous at bedtime  insulin lispro Injectable (ADMELOG) 22 Unit(s) SubCutaneous three times a day before meals  latanoprost 0.005% Ophthalmic Solution 1 Drop(s) Both EYES at bedtime  levothyroxine 50 MICROGram(s) Oral daily  losartan 100 milliGRAM(s) Oral daily  NIFEdipine XL 60 milliGRAM(s) Oral two times a day  pantoprazole    Tablet 40 milliGRAM(s) Oral before breakfast  potassium chloride    Tablet ER 20 milliEquivalent(s) Oral once    MEDICATIONS  (PRN):  acetaminophen     Tablet .. 650 milliGRAM(s) Oral every 6 hours PRN Temp greater or equal to 38C (100.4F), Mild Pain (1 - 3)  albuterol/ipratropium for Nebulization 3 milliLiter(s) Nebulizer every 6 hours PRN Shortness of Breath and/or Wheezing  dextrose Oral Gel 15 Gram(s) Oral once PRN Blood Glucose LESS THAN 70 milliGRAM(s)/deciliter  melatonin 3 milliGRAM(s) Oral at bedtime PRN Insomnia      Allergies: No Known Allergies      Review of Systems:  Respiratory: No SOB, no cough  GI: No nausea, vomiting, abdominal pain  Endocrine: no polyuria, polydipsia    PHYSICAL EXAM:  VITALS: T(C): 36.9 (12-29-22 @ 12:31)  T(F): 98.4 (12-29-22 @ 12:31), Max: 98.4 (12-29-22 @ 12:31)  HR: 57 (12-29-22 @ 12:31) (52 - 76)  BP: 125/58 (12-29-22 @ 12:31) (125/58 - 154/54)  RR:  (17 - 18)  SpO2:  (98% - 100%)  Wt(kg): --  GENERAL: NAD, well-groomed, well-developed  RESPIRATORY: No labored breathing   GI: Soft, nontender, non distended  PSYCH: Alert and oriented x 3, normal affect, normal mood      CAPILLARY BLOOD GLUCOSE  POCT Blood Glucose.: 298 mg/dL (29 Dec 2022 12:13)  POCT Blood Glucose.: 179 mg/dL (29 Dec 2022 08:35)  POCT Blood Glucose.: 208 mg/dL (28 Dec 2022 21:58)  POCT Blood Glucose.: 248 mg/dL (28 Dec 2022 17:27)    A1C with Estimated Average Glucose (12.22.22 @ 06:16)    A1C with Estimated Average Glucose Result: 6.5    Estimated Average Glucose: 140      12-29    141  |  104  |  19  ----------------------------<  176<H>  3.4<L>   |  25  |  0.89    eGFR: 67    Ca    8.5      12-29  Mg     1.90     12-29  Phos  3.5     12-29    TPro  6.0  /  Alb  3.0<L>  /  TBili  0.2  /  DBili  x   /  AST  13  /  ALT  14  /  AlkPhos  97  12-27      Thyroid Function Tests:  12-26 @ 07:02 TSH 0.66 FreeT4 1.4 T3 -- Anti TPO -- Anti Thyroglobulin Ab -- TSI --      Diet, DASH/TLC:   Sodium & Cholesterol Restricted  Consistent Carbohydrate Evening Snack (CSTCHOSN)  Supplement Feeding Modality:  Oral  Ensure Max Cans or Servings Per Day:  1       Frequency:  Daily (12-28-22 @ 12:08) [Active]                      
PROGRESS NOTE:   Authoted by Dr. Luis F Metz MD, DEVIN  Pager [phone] [facility]     Patient is a 77y old  Female who presents with a chief complaint of SOB/ Large mediastinal mass (24 Dec 2022 09:43)    SUBJECTIVE / OVERNIGHT EVENTS:  No acute events overnight   Doing well off HFNC, now on 6L NC breathing comfortably   No chest pain, nor abdominal pain    ADDITIONAL REVIEW OF SYSTEMS:    MEDICATIONS  (STANDING):  albuterol    90 MICROgram(s) HFA Inhaler 2 Puff(s) Inhalation every 3 hours  atorvastatin 40 milliGRAM(s) Oral at bedtime  azithromycin  IVPB      azithromycin  IVPB 500 milliGRAM(s) IV Intermittent every 24 hours  carvedilol 25 milliGRAM(s) Oral every 12 hours  cefTRIAXone   IVPB 1000 milliGRAM(s) IV Intermittent every 24 hours  dexAMETHasone  Injectable 6 milliGRAM(s) IV Push daily  dextrose 5%. 1000 milliLiter(s) (100 mL/Hr) IV Continuous <Continuous>  dextrose 5%. 1000 milliLiter(s) (50 mL/Hr) IV Continuous <Continuous>  dextrose 50% Injectable 25 Gram(s) IV Push once  dextrose 50% Injectable 12.5 Gram(s) IV Push once  dextrose 50% Injectable 25 Gram(s) IV Push once  enoxaparin Injectable 40 milliGRAM(s) SubCutaneous every 24 hours  glucagon  Injectable 1 milliGRAM(s) IntraMuscular once  insulin glargine Injectable (LANTUS) 44 Unit(s) SubCutaneous at bedtime  insulin lispro (ADMELOG) corrective regimen sliding scale   SubCutaneous three times a day before meals  insulin lispro (ADMELOG) corrective regimen sliding scale   SubCutaneous at bedtime  insulin lispro Injectable (ADMELOG) 7 Unit(s) SubCutaneous three times a day before meals  latanoprost 0.005% Ophthalmic Solution 1 Drop(s) Both EYES at bedtime  levothyroxine 50 MICROGram(s) Oral daily  losartan 100 milliGRAM(s) Oral daily  pantoprazole    Tablet 40 milliGRAM(s) Oral before breakfast  remdesivir  IVPB 100 milliGRAM(s) IV Intermittent every 24 hours  remdesivir  IVPB   IV Intermittent     MEDICATIONS  (PRN):  acetaminophen     Tablet .. 650 milliGRAM(s) Oral every 6 hours PRN Temp greater or equal to 38C (100.4F), Mild Pain (1 - 3)  albuterol/ipratropium for Nebulization 3 milliLiter(s) Nebulizer every 6 hours PRN Shortness of Breath and/or Wheezing  dextrose Oral Gel 15 Gram(s) Oral once PRN Blood Glucose LESS THAN 70 milliGRAM(s)/deciliter  melatonin 3 milliGRAM(s) Oral at bedtime PRN Insomnia    OBJECTIVE:  Vital Signs Last 24 Hrs  T(C): 37.1 (24 Dec 2022 06:20), Max: 37.1 (24 Dec 2022 06:20)  T(F): 98.7 (24 Dec 2022 06:20), Max: 98.7 (24 Dec 2022 06:20)  HR: 75 (24 Dec 2022 11:45) (75 - 90)  BP: 147/68 (24 Dec 2022 06:20) (142/57 - 147/68)  RR: 18 (24 Dec 2022 11:45) (16 - 18)  SpO2: 98% (24 Dec 2022 11:45) (97% - 100%)    Parameters below as of 24 Dec 2022 11:45  Patient On (Oxygen Delivery Method): nasal cannula  O2 Flow (L/min): 6    I&O's Summary    23 Dec 2022 07:01  -  24 Dec 2022 07:00  --------------------------------------------------------  IN: 220 mL / OUT: 0 mL / NET: 220 mL    CONSTITUTIONAL: NA  HEAD:  Atraumatic, Normocephalic  EYES: EOMI, PERRLA, conjunctiva and sclera clear  ENMT: No tonsillar erythema, exudates, or enlargement; Moist mucous membranes  NECK: Supple, No JVD  NERVOUS SYSTEM: AOX3, motor and sensation grossly intact in b/l UE and b/l LE  PSYCHIATRIC: Appropriate affect and mood  CHEST/LUNG: +Inspiratory crackles predominantly in the RUL   HEART: Regular rate and rhythm; No murmurs. No LE edema  ABDOMEN: Soft, Nontender, Nondistended; Bowel sounds present  EXTREMITIES:  2+ Peripheral Pulses, No clubbing, cyanosis  SKIN: No rashes or lesions    LABS:                        12.0   10.80 )-----------( 206      ( 24 Dec 2022 06:44 )             35.5     12-24    140  |  103  |  23  ----------------------------<  218<H>  4.2   |  27  |  0.88    Ca    9.5      24 Dec 2022 06:44  Phos  3.6     12-24  Mg     2.20     12-24    TPro  7.3  /  Alb  3.6  /  TBili  0.4  /  DBili  x   /  AST  13  /  ALT  14  /  AlkPhos  116  12-24    CAPILLARY BLOOD GLUCOSE  POCT Blood Glucose.: 284 mg/dL (24 Dec 2022 12:32)  POCT Blood Glucose.: 245 mg/dL (24 Dec 2022 08:58)  POCT Blood Glucose.: 254 mg/dL (23 Dec 2022 21:29)  POCT Blood Glucose.: 309 mg/dL (23 Dec 2022 17:01)    RADIOLOGY & ADDITIONAL TESTS:  Results Reviewed:   Imaging Personally Reviewed:  Electrocardiogram Personally Reviewed:    COORDINATION OF CARE:  Care Discussed with Consultants/Other Providers [Y/N]:  Prior or Outpatient Records Reviewed [Y/N]:  
    Chief Complaint: Poorly controlled T2DM with hyperglycemia    History: Pt seen at bedside. Pt tolerating oral diet. Pt denies nausea and vomiting/any signs of hypoglycemia. Pt reports an adequate appetite.     MEDICATIONS  (STANDING):  albuterol    90 MICROgram(s) HFA Inhaler 2 Puff(s) Inhalation every 3 hours  atorvastatin 40 milliGRAM(s) Oral at bedtime  carvedilol 25 milliGRAM(s) Oral every 12 hours  dexAMETHasone  Injectable 6 milliGRAM(s) IV Push daily  dextrose 5%. 1000 milliLiter(s) (100 mL/Hr) IV Continuous <Continuous>  dextrose 5%. 1000 milliLiter(s) (50 mL/Hr) IV Continuous <Continuous>  dextrose 50% Injectable 25 Gram(s) IV Push once  dextrose 50% Injectable 12.5 Gram(s) IV Push once  dextrose 50% Injectable 25 Gram(s) IV Push once  enoxaparin Injectable 40 milliGRAM(s) SubCutaneous every 24 hours  glucagon  Injectable 1 milliGRAM(s) IntraMuscular once  insulin glargine Injectable (LANTUS) 56 Unit(s) SubCutaneous at bedtime  insulin lispro (ADMELOG) corrective regimen sliding scale   SubCutaneous three times a day before meals  insulin lispro (ADMELOG) corrective regimen sliding scale   SubCutaneous at bedtime  insulin lispro Injectable (ADMELOG) 20 Unit(s) SubCutaneous three times a day before meals  latanoprost 0.005% Ophthalmic Solution 1 Drop(s) Both EYES at bedtime  levothyroxine 50 MICROGram(s) Oral daily  losartan 100 milliGRAM(s) Oral daily  NIFEdipine XL 60 milliGRAM(s) Oral two times a day  pantoprazole    Tablet 40 milliGRAM(s) Oral before breakfast    MEDICATIONS  (PRN):  acetaminophen     Tablet .. 650 milliGRAM(s) Oral every 6 hours PRN Temp greater or equal to 38C (100.4F), Mild Pain (1 - 3)  albuterol/ipratropium for Nebulization 3 milliLiter(s) Nebulizer every 6 hours PRN Shortness of Breath and/or Wheezing  dextrose Oral Gel 15 Gram(s) Oral once PRN Blood Glucose LESS THAN 70 milliGRAM(s)/deciliter  melatonin 3 milliGRAM(s) Oral at bedtime PRN Insomnia      Allergies: No Known Allergies    Review of Systems:  Respiratory: No SOB, no cough  GI: No nausea, vomiting, abdominal pain  Endocrine: no polyuria, polydipsia      PHYSICAL EXAM:  VITALS: T(C): 36.8 (12-28-22 @ 17:30)  T(F): 98.3 (12-28-22 @ 17:30), Max: 98.6 (12-27-22 @ 21:00)  HR: 76 (12-28-22 @ 17:30) (52 - 76)  BP: 152/63 (12-28-22 @ 17:30) (138/70 - 156/66)  RR:  (18 - 20)  SpO2:  (97% - 100%)  Wt(kg): --  GENERAL: NAD, well-groomed, well-developed  RESPIRATORY: No labored breathing   GI: Soft, nontender, non distended  PSYCH: Alert and oriented x 3, normal affect, normal mood      CAPILLARY BLOOD GLUCOSE  POCT Blood Glucose.: 248 mg/dL (28 Dec 2022 17:27)  POCT Blood Glucose.: 205 mg/dL (28 Dec 2022 12:16)  POCT Blood Glucose.: 206 mg/dL (28 Dec 2022 08:27)  POCT Blood Glucose.: 186 mg/dL (27 Dec 2022 22:09)  POCT Blood Glucose.: 158 mg/dL (27 Dec 2022 18:17)    A1C with Estimated Average Glucose (12.22.22 @ 06:16)    A1C with Estimated Average Glucose Result: 6.5    Estimated Average Glucose: 140      12-28    140  |  104  |  18  ----------------------------<  162<H>  3.4<L>   |  26  |  0.85    eGFR: 71    Ca    8.9      12-28  Mg     1.80     12-28  Phos  4.0     12-28    TPro  6.0  /  Alb  3.0<L>  /  TBili  0.2  /  DBili  x   /  AST  13  /  ALT  14  /  AlkPhos  97  12-27        Thyroid Function Tests:  12-26 @ 07:02 TSH 0.66 FreeT4 1.4 T3 -- Anti TPO -- Anti Thyroglobulin Ab -- TSI --        Diet, DASH/TLC:   Sodium & Cholesterol Restricted  Consistent Carbohydrate Evening Snack (CSTCHOSN)  Supplement Feeding Modality:  Oral  Ensure Max Cans or Servings Per Day:  1       Frequency:  Daily (12-28-22 @ 12:08) [Active]                    
[facility] Division of Hospital Medicine  Estrellita Issa MD EdM  Pager [phone]  Also available on MS Teams    SUBJECTIVE / OVERNIGHT EVENTS:  Admitted overnight  Developed worsening dyspnea with movement while on nasal cannula and so placed on BiPAP around 6am, breathing has been feeling better since. No documented desaturations.    COVID dx on , reports shortness of breath started on ~Tuesday.    MEDICATIONS  (STANDING):  albuterol    90 MICROgram(s) HFA Inhaler 2 Puff(s) Inhalation every 3 hours  atorvastatin 40 milliGRAM(s) Oral at bedtime  azithromycin  IVPB      azithromycin  IVPB 500 milliGRAM(s) IV Intermittent once  carvedilol 25 milliGRAM(s) Oral every 12 hours  cefTRIAXone   IVPB 1000 milliGRAM(s) IV Intermittent every 24 hours  dexAMETHasone  Injectable 6 milliGRAM(s) IV Push daily  dextrose 5%. 1000 milliLiter(s) (100 mL/Hr) IV Continuous <Continuous>  dextrose 5%. 1000 milliLiter(s) (50 mL/Hr) IV Continuous <Continuous>  dextrose 50% Injectable 25 Gram(s) IV Push once  dextrose 50% Injectable 12.5 Gram(s) IV Push once  dextrose 50% Injectable 25 Gram(s) IV Push once  enoxaparin Injectable 40 milliGRAM(s) SubCutaneous every 24 hours  glucagon  Injectable 1 milliGRAM(s) IntraMuscular once  insulin glargine Injectable (LANTUS) 26 Unit(s) SubCutaneous at bedtime  insulin lispro (ADMELOG) corrective regimen sliding scale   SubCutaneous three times a day before meals  insulin lispro (ADMELOG) corrective regimen sliding scale   SubCutaneous at bedtime  lactated ringers. 1000 milliLiter(s) (75 mL/Hr) IV Continuous <Continuous>  latanoprost 0.005% Ophthalmic Solution 1 Drop(s) Both EYES at bedtime  levothyroxine 50 MICROGram(s) Oral daily  remdesivir  IVPB   IV Intermittent     MEDICATIONS  (PRN):  acetaminophen     Tablet .. 650 milliGRAM(s) Oral every 6 hours PRN Temp greater or equal to 38C (100.4F), Mild Pain (1 - 3)  albuterol/ipratropium for Nebulization 3 milliLiter(s) Nebulizer every 6 hours PRN Shortness of Breath and/or Wheezing  aluminum hydroxide/magnesium hydroxide/simethicone Suspension 30 milliLiter(s) Oral every 4 hours PRN Dyspepsia  dextrose Oral Gel 15 Gram(s) Oral once PRN Blood Glucose LESS THAN 70 milliGRAM(s)/deciliter  melatonin 3 milliGRAM(s) Oral at bedtime PRN Insomnia  ondansetron Injectable 4 milliGRAM(s) IV Push every 8 hours PRN Nausea and/or Vomiting      PHYSICAL EXAM:  Vital Signs Last 24 Hrs  T(C): 37.3 (22 Dec 2022 12:16), Max: 37.4 (21 Dec 2022 15:46)  T(F): 99.1 (22 Dec 2022 12:16), Max: 99.4 (21 Dec 2022 15:46)  HR: 99 (22 Dec 2022 12:16) (91 - 119)  BP: 145/68 (22 Dec 2022 12:16) (126/56 - 156/85)  BP(mean): --  RR: 22 (22 Dec 2022 12:16) (20 - 28)  SpO2: 100% (22 Dec 2022 12:16) (95% - 100%)    Parameters below as of 22 Dec 2022 12:16  Patient On (Oxygen Delivery Method): nasal cannula, high flow      CONSTITUTIONAL: no acute distress, conversation limited due to BiPAP in place  EYES: PERRLA; conjunctiva and sclera clear  ENMT: unable to examine OP given BiPAP in place  NECK: Normal ROM, no palpable masses  RESPIRATORY: Normal respiratory effort; course breath sounds anteriorly bilaterally, exam limited by external noise from BiPAP machine  CARDIOVASCULAR: Regular rate and rhythm, normal S1 and S2, no murmur/rub/gallop appreciated though difficult to hear due to BiPAP; No lower extremity edema  ABDOMEN: no tenderness to palpation, normoactive bowel sounds, no rebound/guarding  MUSCULOSKELETAL: no joint swelling or tenderness to palpation  PSYCH: A+O to person, place, and time; affect appropriate  NEUROLOGY: CN 2-12 are intact and symmetric; no gross sensory deficits   SKIN: No rashes; no palpable lesions    LABS:                        11.6   10.08 )-----------( 177      ( 22 Dec 2022 06:16 )             34.0         137  |  101  |  18  ----------------------------<  286<H>  4.4   |  26  |  1.05    Ca    8.8      22 Dec 2022 06:16  Phos  3.2       Mg     1.70         TPro  6.7  /  Alb  3.5  /  TBili  0.8  /  DBili  x   /  AST  13  /  ALT  8   /  AlkPhos  83      PT/INR - ( 22 Dec 2022 06:16 )   PT: 13.7 sec;   INR: 1.18 ratio         PTT - ( 22 Dec 2022 06:16 )  PTT:28.2 sec      Urinalysis Basic - ( 21 Dec 2022 09:05 )    Color: Yellow / Appearance: Clear / S.020 / pH: x  Gluc: x / Ketone: Negative  / Bili: Negative / Urobili: Negative mg/dL   Blood: x / Protein: 500 mg/dL / Nitrite: Negative   Leuk Esterase: Trace / RBC: 0-2 /HPF / WBC 3-5   Sq Epi: x / Non Sq Epi: Few / Bacteria: Occasional        SARS-CoV-2: Detected (21 Dec 2022 08:40)  
PROGRESS NOTE:     Patient is a 77y old  Female who presents with a chief complaint of SOB/ Large mediastinal mass (30 Dec 2022 13:15)      SUBJECTIVE / OVERNIGHT EVENTS: No complaints.     ADDITIONAL REVIEW OF SYSTEMS:    MEDICATIONS  (STANDING):  albuterol    90 MICROgram(s) HFA Inhaler 2 Puff(s) Inhalation every 3 hours  atorvastatin 40 milliGRAM(s) Oral at bedtime  carvedilol 25 milliGRAM(s) Oral every 12 hours  dextrose 5%. 1000 milliLiter(s) (100 mL/Hr) IV Continuous <Continuous>  dextrose 5%. 1000 milliLiter(s) (50 mL/Hr) IV Continuous <Continuous>  dextrose 50% Injectable 25 Gram(s) IV Push once  dextrose 50% Injectable 12.5 Gram(s) IV Push once  dextrose 50% Injectable 25 Gram(s) IV Push once  enoxaparin Injectable 40 milliGRAM(s) SubCutaneous every 24 hours  glucagon  Injectable 1 milliGRAM(s) IntraMuscular once  insulin glargine Injectable (LANTUS) 50 Unit(s) SubCutaneous at bedtime  insulin lispro (ADMELOG) corrective regimen sliding scale   SubCutaneous three times a day before meals  insulin lispro (ADMELOG) corrective regimen sliding scale   SubCutaneous at bedtime  insulin lispro Injectable (ADMELOG) 15 Unit(s) SubCutaneous three times a day before meals  latanoprost 0.005% Ophthalmic Solution 1 Drop(s) Both EYES at bedtime  levothyroxine 50 MICROGram(s) Oral daily  losartan 100 milliGRAM(s) Oral daily  NIFEdipine XL 60 milliGRAM(s) Oral two times a day  pantoprazole    Tablet 40 milliGRAM(s) Oral before breakfast  potassium chloride    Tablet ER 40 milliEquivalent(s) Oral every 4 hours    MEDICATIONS  (PRN):  acetaminophen     Tablet .. 650 milliGRAM(s) Oral every 6 hours PRN Temp greater or equal to 38C (100.4F), Mild Pain (1 - 3)  albuterol/ipratropium for Nebulization 3 milliLiter(s) Nebulizer every 6 hours PRN Shortness of Breath and/or Wheezing  dextrose Oral Gel 15 Gram(s) Oral once PRN Blood Glucose LESS THAN 70 milliGRAM(s)/deciliter  melatonin 3 milliGRAM(s) Oral at bedtime PRN Insomnia      CAPILLARY BLOOD GLUCOSE      POCT Blood Glucose.: 178 mg/dL (31 Dec 2022 12:53)  POCT Blood Glucose.: 85 mg/dL (31 Dec 2022 08:46)  POCT Blood Glucose.: 239 mg/dL (30 Dec 2022 22:11)  POCT Blood Glucose.: 168 mg/dL (30 Dec 2022 17:29)    I&O's Summary      PHYSICAL EXAM:  Vital Signs Last 24 Hrs  T(C): 36.7 (31 Dec 2022 11:45), Max: 37.2 (31 Dec 2022 01:00)  T(F): 98.1 (31 Dec 2022 11:45), Max: 99 (31 Dec 2022 01:00)  HR: 58 (31 Dec 2022 11:45) (56 - 65)  BP: 128/57 (31 Dec 2022 11:45) (124/53 - 141/68)  BP(mean): --  RR: 18 (31 Dec 2022 11:45) (17 - 18)  SpO2: 99% (31 Dec 2022 11:45) (97% - 99%)    Parameters below as of 31 Dec 2022 11:45  Patient On (Oxygen Delivery Method): room air        CONSTITUTIONAL: NAD, well-developed  RESPIRATORY: Normal respiratory effort; lungs are clear to auscultation bilaterally  CARDIOVASCULAR: Regular rate and rhythm, normal S1 and S2, no murmur/rub/gallop; No lower extremity edema; Peripheral pulses are 2+ bilaterally  ABDOMEN: Nontender to palpation, normoactive bowel sounds, no rebound/guarding; No hepatosplenomegaly  MUSCLOSKELETAL: no clubbing or cyanosis of digits; no joint swelling or tenderness to palpation  PSYCH: A+O to person, place, and time; affect appropriate  NEURO: CN's intact, motor and sensation grossly intact in b/l UE and b/l LE    LABS:                        11.0   8.24  )-----------( 216      ( 31 Dec 2022 06:27 )             32.7     12-31    140  |  105  |  25<H>  ----------------------------<  122<H>  3.3<L>   |  24  |  0.96    Ca    8.7      31 Dec 2022 06:27  Phos  3.8     12-31  Mg     1.90     12-31                  RADIOLOGY & ADDITIONAL TESTS:  Results Reviewed:   Imaging Personally Reviewed:  Electrocardiogram Personally Reviewed:    COORDINATION OF CARE:  Care Discussed with Consultants/Other Providers [Y/N]:  Prior or Outpatient Records Reviewed [Y/N]:  
PROGRESS NOTE:     Patient is a 77y old  Female who presents with a chief complaint of SOB/ Large mediastinal mass (29 Dec 2022 10:04)      SUBJECTIVE / OVERNIGHT EVENTS: Pt feels a bit lightheaded.     ADDITIONAL REVIEW OF SYSTEMS:    MEDICATIONS  (STANDING):  albuterol    90 MICROgram(s) HFA Inhaler 2 Puff(s) Inhalation every 3 hours  atorvastatin 40 milliGRAM(s) Oral at bedtime  carvedilol 25 milliGRAM(s) Oral every 12 hours  dexAMETHasone  Injectable 6 milliGRAM(s) IV Push daily  dextrose 5%. 1000 milliLiter(s) (50 mL/Hr) IV Continuous <Continuous>  dextrose 5%. 1000 milliLiter(s) (100 mL/Hr) IV Continuous <Continuous>  dextrose 50% Injectable 25 Gram(s) IV Push once  dextrose 50% Injectable 12.5 Gram(s) IV Push once  dextrose 50% Injectable 25 Gram(s) IV Push once  enoxaparin Injectable 40 milliGRAM(s) SubCutaneous every 24 hours  glucagon  Injectable 1 milliGRAM(s) IntraMuscular once  insulin glargine Injectable (LANTUS) 56 Unit(s) SubCutaneous at bedtime  insulin lispro (ADMELOG) corrective regimen sliding scale   SubCutaneous three times a day before meals  insulin lispro (ADMELOG) corrective regimen sliding scale   SubCutaneous at bedtime  insulin lispro Injectable (ADMELOG) 22 Unit(s) SubCutaneous three times a day before meals  latanoprost 0.005% Ophthalmic Solution 1 Drop(s) Both EYES at bedtime  levothyroxine 50 MICROGram(s) Oral daily  losartan 100 milliGRAM(s) Oral daily  NIFEdipine XL 60 milliGRAM(s) Oral two times a day  pantoprazole    Tablet 40 milliGRAM(s) Oral before breakfast  potassium chloride    Tablet ER 20 milliEquivalent(s) Oral once    MEDICATIONS  (PRN):  acetaminophen     Tablet .. 650 milliGRAM(s) Oral every 6 hours PRN Temp greater or equal to 38C (100.4F), Mild Pain (1 - 3)  albuterol/ipratropium for Nebulization 3 milliLiter(s) Nebulizer every 6 hours PRN Shortness of Breath and/or Wheezing  dextrose Oral Gel 15 Gram(s) Oral once PRN Blood Glucose LESS THAN 70 milliGRAM(s)/deciliter  melatonin 3 milliGRAM(s) Oral at bedtime PRN Insomnia      CAPILLARY BLOOD GLUCOSE      POCT Blood Glucose.: 298 mg/dL (29 Dec 2022 12:13)  POCT Blood Glucose.: 179 mg/dL (29 Dec 2022 08:35)  POCT Blood Glucose.: 208 mg/dL (28 Dec 2022 21:58)  POCT Blood Glucose.: 248 mg/dL (28 Dec 2022 17:27)    I&O's Summary      PHYSICAL EXAM:  Vital Signs Last 24 Hrs  T(C): 36.9 (29 Dec 2022 12:31), Max: 36.9 (29 Dec 2022 12:31)  T(F): 98.4 (29 Dec 2022 12:31), Max: 98.4 (29 Dec 2022 12:31)  HR: 57 (29 Dec 2022 12:31) (52 - 76)  BP: 125/58 (29 Dec 2022 12:31) (125/58 - 154/54)  BP(mean): --  RR: 18 (29 Dec 2022 12:31) (17 - 18)  SpO2: 100% (29 Dec 2022 12:31) (98% - 100%)    Parameters below as of 29 Dec 2022 12:31  Patient On (Oxygen Delivery Method): nasal cannula  O2 Flow (L/min): 2      CONSTITUTIONAL: NAD, well-developed  RESPIRATORY: Normal respiratory effort; lungs are clear to auscultation bilaterally  CARDIOVASCULAR: Regular rate and rhythm, normal S1 and S2, no murmur/rub/gallop; No lower extremity edema; Peripheral pulses are 2+ bilaterally  ABDOMEN: Nontender to palpation, normoactive bowel sounds, no rebound/guarding; No hepatosplenomegaly  MUSCLOSKELETAL: no clubbing or cyanosis of digits; no joint swelling or tenderness to palpation  PSYCH: A+O to person, place, and time; affect appropriate  NEURO: CN's intact, motor and sensation grossly intact in b/l UE and b/l LE    LABS:                        10.6   7.72  )-----------( 187      ( 29 Dec 2022 05:52 )             31.7     12-29    141  |  104  |  19  ----------------------------<  176<H>  3.4<L>   |  25  |  0.89    Ca    8.5      29 Dec 2022 05:52  Phos  3.5     12-29  Mg     1.90     12-29                  RADIOLOGY & ADDITIONAL TESTS:  Results Reviewed:   Imaging Personally Reviewed:  Electrocardiogram Personally Reviewed:    COORDINATION OF CARE:  Care Discussed with Consultants/Other Providers [Y/N]:  Prior or Outpatient Records Reviewed [Y/N]:  
Manju Harris MD  [facility] Division of Hospital Medicine  Pager: [phone]  Available via MS Teams    SUBJECTIVE / OVERNIGHT EVENTS:    No new complaints     MEDICATIONS  (STANDING):  albuterol    90 MICROgram(s) HFA Inhaler 2 Puff(s) Inhalation every 3 hours  atorvastatin 40 milliGRAM(s) Oral at bedtime  azithromycin  IVPB      azithromycin  IVPB 500 milliGRAM(s) IV Intermittent every 24 hours  carvedilol 25 milliGRAM(s) Oral every 12 hours  dexAMETHasone  Injectable 6 milliGRAM(s) IV Push daily  dextrose 5%. 1000 milliLiter(s) (100 mL/Hr) IV Continuous <Continuous>  dextrose 5%. 1000 milliLiter(s) (50 mL/Hr) IV Continuous <Continuous>  dextrose 50% Injectable 25 Gram(s) IV Push once  dextrose 50% Injectable 12.5 Gram(s) IV Push once  dextrose 50% Injectable 25 Gram(s) IV Push once  enoxaparin Injectable 40 milliGRAM(s) SubCutaneous every 24 hours  glucagon  Injectable 1 milliGRAM(s) IntraMuscular once  insulin glargine Injectable (LANTUS) 54 Unit(s) SubCutaneous at bedtime  insulin lispro (ADMELOG) corrective regimen sliding scale   SubCutaneous three times a day before meals  insulin lispro (ADMELOG) corrective regimen sliding scale   SubCutaneous at bedtime  insulin lispro Injectable (ADMELOG) 16 Unit(s) SubCutaneous three times a day before meals  latanoprost 0.005% Ophthalmic Solution 1 Drop(s) Both EYES at bedtime  levothyroxine 50 MICROGram(s) Oral daily  losartan 100 milliGRAM(s) Oral daily  NIFEdipine XL 60 milliGRAM(s) Oral two times a day  pantoprazole    Tablet 40 milliGRAM(s) Oral before breakfast    MEDICATIONS  (PRN):  acetaminophen     Tablet .. 650 milliGRAM(s) Oral every 6 hours PRN Temp greater or equal to 38C (100.4F), Mild Pain (1 - 3)  albuterol/ipratropium for Nebulization 3 milliLiter(s) Nebulizer every 6 hours PRN Shortness of Breath and/or Wheezing  dextrose Oral Gel 15 Gram(s) Oral once PRN Blood Glucose LESS THAN 70 milliGRAM(s)/deciliter  melatonin 3 milliGRAM(s) Oral at bedtime PRN Insomnia      I&O's Summary    25 Dec 2022 07:01  -  26 Dec 2022 07:00  --------------------------------------------------------  IN: 240 mL / OUT: 900 mL / NET: -660 mL        PHYSICAL EXAM:  Vital Signs Last 24 Hrs  T(C): 36.7 (26 Dec 2022 11:01), Max: 36.8 (25 Dec 2022 21:03)  T(F): 98.1 (26 Dec 2022 11:01), Max: 98.3 (25 Dec 2022 21:03)  HR: 64 (26 Dec 2022 16:07) (58 - 70)  BP: 145/63 (26 Dec 2022 11:01) (141/60 - 162/70)  BP(mean): --  RR: 18 (26 Dec 2022 16:07) (18 - 20)  SpO2: 100% (26 Dec 2022 16:07) (95% - 100%)    Parameters below as of 26 Dec 2022 16:07  Patient On (Oxygen Delivery Method): nasal cannula  O2 Flow (L/min): 6    CONSTITUTIONAL: NAD, well-developed. On high flow 20L at 55%   EYES: conjunctiva and sclera clear  ENMT: Moist oral mucosa   NECK: Supple   RESPIRATORY: decreased resp sounds   CARDIOVASCULAR: Regular rate and rhythm, normal S1 and S2, no murmur/rub/gallop; Peripheral pulses are 2+ bilaterally  ABDOMEN: Nontender to palpation, normoactive bowel sounds, no rebound/guarding   MUSCULOSKELETAL: No lower extremity edema   PSYCH: A+O to person, place, and time; affect appropriate  NEUROLOGY: no gross sensory or motor deficits   SKIN: No rashes    LABS:                        11.4   7.50  )-----------( 198      ( 26 Dec 2022 07:02 )             34.2     12-26    141  |  104  |  18  ----------------------------<  251<H>  3.2<L>   |  27  |  0.84    Ca    8.5      26 Dec 2022 07:02  Phos  2.9     12-26  Mg     1.90     12-26    TPro  6.2  /  Alb  2.9<L>  /  TBili  0.2  /  DBili  x   /  AST  9   /  ALT  16  /  AlkPhos  99  12-26              SARS-CoV-2: Detected (21 Dec 2022 08:40)      RADIOLOGY & ADDITIONAL TESTS:  Other Results Reviewed Today: BMP with stable Cr, CBC with stable Hg     COORDINATION OF CARE:  Communication: discussed plan of care with ACP 
PROGRESS NOTE:     Patient is a 77y old  Female who presents with a chief complaint of SOB/ Large mediastinal mass (26 Dec 2022 17:26)      SUBJECTIVE / OVERNIGHT EVENTS: Feels better, requiring less oxygen.     ADDITIONAL REVIEW OF SYSTEMS:    MEDICATIONS  (STANDING):  albuterol    90 MICROgram(s) HFA Inhaler 2 Puff(s) Inhalation every 3 hours  atorvastatin 40 milliGRAM(s) Oral at bedtime  carvedilol 25 milliGRAM(s) Oral every 12 hours  dexAMETHasone  Injectable 6 milliGRAM(s) IV Push daily  dextrose 5%. 1000 milliLiter(s) (50 mL/Hr) IV Continuous <Continuous>  dextrose 5%. 1000 milliLiter(s) (100 mL/Hr) IV Continuous <Continuous>  dextrose 50% Injectable 25 Gram(s) IV Push once  dextrose 50% Injectable 12.5 Gram(s) IV Push once  dextrose 50% Injectable 25 Gram(s) IV Push once  enoxaparin Injectable 40 milliGRAM(s) SubCutaneous every 24 hours  glucagon  Injectable 1 milliGRAM(s) IntraMuscular once  insulin glargine Injectable (LANTUS) 54 Unit(s) SubCutaneous at bedtime  insulin lispro (ADMELOG) corrective regimen sliding scale   SubCutaneous three times a day before meals  insulin lispro (ADMELOG) corrective regimen sliding scale   SubCutaneous at bedtime  insulin lispro Injectable (ADMELOG) 16 Unit(s) SubCutaneous three times a day before meals  latanoprost 0.005% Ophthalmic Solution 1 Drop(s) Both EYES at bedtime  levothyroxine 50 MICROGram(s) Oral daily  losartan 100 milliGRAM(s) Oral daily  NIFEdipine XL 60 milliGRAM(s) Oral two times a day  pantoprazole    Tablet 40 milliGRAM(s) Oral before breakfast    MEDICATIONS  (PRN):  acetaminophen     Tablet .. 650 milliGRAM(s) Oral every 6 hours PRN Temp greater or equal to 38C (100.4F), Mild Pain (1 - 3)  albuterol/ipratropium for Nebulization 3 milliLiter(s) Nebulizer every 6 hours PRN Shortness of Breath and/or Wheezing  dextrose Oral Gel 15 Gram(s) Oral once PRN Blood Glucose LESS THAN 70 milliGRAM(s)/deciliter  melatonin 3 milliGRAM(s) Oral at bedtime PRN Insomnia      CAPILLARY BLOOD GLUCOSE      POCT Blood Glucose.: 205 mg/dL (27 Dec 2022 13:06)  POCT Blood Glucose.: 184 mg/dL (27 Dec 2022 08:40)  POCT Blood Glucose.: 241 mg/dL (26 Dec 2022 22:09)  POCT Blood Glucose.: 214 mg/dL (26 Dec 2022 17:06)    I&O's Summary    26 Dec 2022 07:01  -  27 Dec 2022 07:00  --------------------------------------------------------  IN: 0 mL / OUT: 1200 mL / NET: -1200 mL        PHYSICAL EXAM:  Vital Signs Last 24 Hrs  T(C): 36.6 (27 Dec 2022 12:36), Max: 36.9 (27 Dec 2022 02:55)  T(F): 97.8 (27 Dec 2022 12:36), Max: 98.5 (27 Dec 2022 02:55)  HR: 97 (27 Dec 2022 12:36) (55 - 97)  BP: 149/87 (27 Dec 2022 12:36) (132/63 - 155/67)  BP(mean): --  RR: 20 (27 Dec 2022 08:10) (18 - 20)  SpO2: 100% (27 Dec 2022 12:36) (97% - 100%)    Parameters below as of 27 Dec 2022 08:10  Patient On (Oxygen Delivery Method): nasal cannula  O2 Flow (L/min): 6      CONSTITUTIONAL: NAD, well-developed  RESPIRATORY: Normal respiratory effort; lungs are clear to auscultation bilaterally  CARDIOVASCULAR: Regular rate and rhythm, normal S1 and S2, no murmur/rub/gallop; No lower extremity edema; Peripheral pulses are 2+ bilaterally  ABDOMEN: Nontender to palpation, normoactive bowel sounds, no rebound/guarding; No hepatosplenomegaly  MUSCLOSKELETAL: no clubbing or cyanosis of digits; no joint swelling or tenderness to palpation  PSYCH: A+O to person, place, and time; affect appropriate  NEURO: CN's intact, motor and sensation grossly intact in b/l UE and b/l LE    LABS:                        11.4   8.17  )-----------( 191      ( 27 Dec 2022 04:51 )             34.6     12-27    141  |  105  |  18  ----------------------------<  162<H>  3.3<L>   |  25  |  0.87    Ca    8.8      27 Dec 2022 04:51  Phos  2.9     12-27  Mg     1.80     12-27    TPro  6.0  /  Alb  3.0<L>  /  TBili  0.2  /  DBili  x   /  AST  13  /  ALT  14  /  AlkPhos  97  12-27                RADIOLOGY & ADDITIONAL TESTS:  Results Reviewed:   Imaging Personally Reviewed:  Electrocardiogram Personally Reviewed:    COORDINATION OF CARE:  Care Discussed with Consultants/Other Providers [Y/N]:  Prior or Outpatient Records Reviewed [Y/N]:

## 2023-01-05 LAB — RENIN PLAS-CCNC: 1.07 NG/ML/HR — SIGNIFICANT CHANGE UP (ref 0.17–5.38)

## 2023-01-09 ENCOUNTER — APPOINTMENT (OUTPATIENT)
Dept: THORACIC SURGERY | Facility: CLINIC | Age: 78
End: 2023-01-09
Payer: MEDICARE

## 2023-01-09 VITALS
OXYGEN SATURATION: 94 % | BODY MASS INDEX: 29.88 KG/M2 | SYSTOLIC BLOOD PRESSURE: 132 MMHG | DIASTOLIC BLOOD PRESSURE: 66 MMHG | HEART RATE: 66 BPM | HEIGHT: 64 IN | WEIGHT: 175 LBS | RESPIRATION RATE: 16 BRPM

## 2023-01-09 DIAGNOSIS — Z87.891 PERSONAL HISTORY OF NICOTINE DEPENDENCE: ICD-10-CM

## 2023-01-09 DIAGNOSIS — Z82.49 FAMILY HISTORY OF ISCHEMIC HEART DISEASE AND OTHER DISEASES OF THE CIRCULATORY SYSTEM: ICD-10-CM

## 2023-01-09 DIAGNOSIS — Z83.3 FAMILY HISTORY OF DIABETES MELLITUS: ICD-10-CM

## 2023-01-09 DIAGNOSIS — Z86.79 PERSONAL HISTORY OF OTHER DISEASES OF THE CIRCULATORY SYSTEM: ICD-10-CM

## 2023-01-09 DIAGNOSIS — Z86.39 PERSONAL HISTORY OF OTHER ENDOCRINE, NUTRITIONAL AND METABOLIC DISEASE: ICD-10-CM

## 2023-01-09 PROCEDURE — 99215 OFFICE O/P EST HI 40 MIN: CPT

## 2023-01-09 RX ORDER — ATORVASTATIN CALCIUM 40 MG/1
40 TABLET, FILM COATED ORAL
Refills: 0 | Status: ACTIVE | COMMUNITY

## 2023-01-09 RX ORDER — LEVOTHYROXINE SODIUM 0.05 MG/1
50 TABLET ORAL
Refills: 0 | Status: ACTIVE | COMMUNITY

## 2023-01-09 RX ORDER — TELMISARTAN AND HYDROCHLOROTHIAZIDE 80; 12.5 MG/1; MG/1
80-12.5 TABLET ORAL
Refills: 0 | Status: ACTIVE | COMMUNITY

## 2023-01-09 RX ORDER — INSULIN DETEMIR 100 [IU]/ML
100 INJECTION, SOLUTION SUBCUTANEOUS
Refills: 0 | Status: ACTIVE | COMMUNITY

## 2023-01-09 RX ORDER — LATANOPROST/PF 0.005 %
0.01 DROPS OPHTHALMIC (EYE)
Refills: 0 | Status: ACTIVE | COMMUNITY

## 2023-01-09 RX ORDER — CARVEDILOL 25 MG/1
25 TABLET, FILM COATED ORAL
Refills: 0 | Status: ACTIVE | COMMUNITY

## 2023-01-09 RX ORDER — NIFEDIPINE 60 MG
60 TABLET, EXTENDED RELEASE ORAL
Refills: 0 | Status: ACTIVE | COMMUNITY

## 2023-01-09 RX ORDER — METFORMIN HYDROCHLORIDE 1000 MG/1
1000 TABLET, COATED ORAL
Refills: 0 | Status: ACTIVE | COMMUNITY

## 2023-01-11 ENCOUNTER — APPOINTMENT (OUTPATIENT)
Dept: CARE COORDINATION | Facility: HOME HEALTH | Age: 78
End: 2023-01-11
Payer: MEDICARE

## 2023-01-11 ENCOUNTER — NON-APPOINTMENT (OUTPATIENT)
Age: 78
End: 2023-01-11

## 2023-01-11 DIAGNOSIS — Z87.01 PERSONAL HISTORY OF PNEUMONIA (RECURRENT): ICD-10-CM

## 2023-01-11 DIAGNOSIS — U07.1 COVID-19: ICD-10-CM

## 2023-01-11 PROCEDURE — 99348 HOME/RES VST EST LOW MDM 30: CPT | Mod: 95

## 2023-01-11 NOTE — PHYSICAL EXAM
[Ambulatory and capable of all self care but unable to carry out any work activities] : Status 2- Ambulatory and capable of all self care but unable to carry out any work activities. Up and about more than 50% of waking hours [Sclera] : the sclera and conjunctiva were normal [PERRL With Normal Accommodation] : pupils were equal in size, round, and reactive to light [Extraocular Movements] : extraocular movements were intact [Neck Appearance] : the appearance of the neck was normal [Neck Cervical Mass (___cm)] : no neck mass was observed [Jugular Venous Distention Increased] : there was no jugular-venous distention [Thyroid Diffuse Enlargement] : the thyroid was not enlarged [Thyroid Nodule] : there were no palpable thyroid nodules [Auscultation Breath Sounds / Voice Sounds] : lungs were clear to auscultation bilaterally [Heart Rate And Rhythm] : heart rate was normal and rhythm regular [Heart Sounds] : normal S1 and S2 [Heart Sounds Gallop] : no gallops [Murmurs] : no murmurs [Heart Sounds Pericardial Friction Rub] : no pericardial rub [Examination Of The Chest] : the chest was normal in appearance [Chest Visual Inspection Thoracic Asymmetry] : no chest asymmetry [Diminished Respiratory Excursion] : normal chest expansion [2+] : left 2+ [Breast Appearance] : normal in appearance [Breast Palpation Mass] : no palpable masses [Bowel Sounds] : normal bowel sounds [Abdomen Soft] : soft [Abdomen Tenderness] : non-tender [] : no hepato-splenomegaly [Abdomen Mass (___ Cm)] : no abdominal mass palpated [Deep Tendon Reflexes (DTR)] : deep tendon reflexes were 2+ and symmetric [Sensation] : the sensory exam was normal to light touch and pinprick [No Focal Deficits] : no focal deficits [Oriented To Time, Place, And Person] : oriented to person, place, and time [Impaired Insight] : insight and judgment were intact [Affect] : the affect was normal [FreeTextEntry1] : sternotomy

## 2023-01-11 NOTE — CONSULT LETTER
[Consult Letter:] : I had the pleasure of evaluating your patient, [unfilled]. [Please see my note below.] : Please see my note below. [Consult Closing:] : Thank you very much for allowing me to participate in the care of this patient.  If you have any questions, please do not hesitate to contact me. [Sincerely,] : Sincerely, [FreeTextEntry2] : discharged from [facility]\par Dr. Strickland (Cardiology) [FreeTextEntry3] : Heraclio Barnhart MD \par Attending Surgeon \par Division of Thoracic Surgery \par , Cardiovascular and Thoracic Surgery \par [facility] [employer] [facility]\par

## 2023-01-11 NOTE — ASSESSMENT
[FreeTextEntry1] : Ms. 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 Patient p/w mediastinal mass.\par \par \par I have reviewed the patient's medical records and diagnostic images at time of this office consultation and have made the following recommendation:\par 1. CT scan reviewed, I recommended patient to consult with ENT Dr. Jorge Escalona\par 2. PFTs\par 3. Thyroid CT\par 4. RTC after all of the above to discuss results and next step.\par \par \par I, ELAINE Harris, personally performed the evaluation and management (E/M) services for this established patient who presents today with (a) new problem(s)/exacerbation of (an) existing condition(s). That E/M includes conducting the examination, assessing all new/exacerbated conditions, and establishing a new plan of care. Today, my ACP, Eamon Lewis NP was here to observe my evaluation and management services for this new problem/exacerbated condition to be followed going forward.\par \par

## 2023-01-11 NOTE — HISTORY OF PRESENT ILLNESS
[FreeTextEntry1] : Ms. 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 Found to have COVID with superimposed multifocal PNA s/p CTX/azithromycin x7d total course and mediastinal mass. Transferred to [facility] for CT Sx evaluation. \par \par CTA on 12/21/2022:\par - Large 7.8 cm heterogeneous structure with internal and peripheral calcification within the middle mediastinum which splays the trachea and esophagus, exerting mass effect on both. Differential includes mediastinal mass, however vascular structure/aneurysm cannot be excluded. CT with noncontrast, arterial and delayed phase imaging is recommended.\par - No pulmonary embolism identified.\par - Bilateral patchy airspace opacities, predominantly in the lower lobes, right greater than left, compatible with multifocal pneumonia. Small left pleural effusion.\par - Indeterminant 2.6 cm left adrenal nodule.\par \par CTA on 12/27/2022:\par - There is again a 6.6 x 6.9 x 6 cm middle mediastinal mass which contains coarse calcifications and there is hyperattenuating and low-attenuation components on the precontrast images. There is robust enhancement of the hyperattenuating components and areas of linear enhancement within the hypoattenuating which is more conspicuous on the delayed images. The mass is located between the trachea and esophagus and displaces and compresses the trachea anteriorly and compresses and obliterates the esophageal lumen posteriorly and does not appear to arise from either. There is no associated lymphadenopathy. The mass is separate from the thyroid gland although this does not exclude ectopic thyroid, particularly given imaging features. The right thyroid lobe is enlarged containing a coarse calcification and areas of ill-defined low-attenuation.\par - There is a stable 5 mm right upper lobe groundglass nodule image 200 series 6. In There is compression into the medial right upper lobe by the mediastinal mass as well as compression and anterior displacement of trachea although it remains widely patent. There are stable small pleural effusions.\par \par Patient is here today for a follow up. Admits to SOB  on exertion, BLE weakness (worsened since COVID), 3+ edema to BLE.

## 2023-01-23 ENCOUNTER — APPOINTMENT (OUTPATIENT)
Dept: PULMONOLOGY | Facility: CLINIC | Age: 78
End: 2023-01-23
Payer: MEDICARE

## 2023-01-23 DIAGNOSIS — Z00.00 ENCOUNTER FOR GENERAL ADULT MEDICAL EXAMINATION W/OUT ABNORMAL FINDINGS: ICD-10-CM

## 2023-01-23 PROCEDURE — 94726 PLETHYSMOGRAPHY LUNG VOLUMES: CPT

## 2023-01-23 PROCEDURE — 94729 DIFFUSING CAPACITY: CPT

## 2023-01-23 PROCEDURE — 94010 BREATHING CAPACITY TEST: CPT

## 2023-01-24 PROBLEM — U07.1 COVID-19: Status: RESOLVED | Noted: 2023-01-24 | Resolved: 2023-01-24

## 2023-01-24 PROBLEM — Z87.01 HISTORY OF PNEUMONIA: Status: RESOLVED | Noted: 2023-01-24 | Resolved: 2023-01-24

## 2023-01-24 NOTE — ASSESSMENT
[FreeTextEntry1] : "77 year old Female with PMH HTN, HLD, T2D, and partial thyroidectomy p/t OSH with worsening weakness, SOB, hypoxia, and known COVID+ (urgent care 12/18). Found to have COVID with superimposed multifocal PNA and mediastinal mass. Transferred to [facility] for CT Sx evaluation. Acute hypoxemic respiratory failure due to COVID-19. Acute hypoxic respiratory failure d/t COVID pneumonia, had required HFNC but then transitioned back to nasal cannula. OSH CTA chest neg for PE but with BL GGO and MF PNA as below COVID+ with procal 1.49 s/p Remdesivir and Decadron,  PRN duonebs.  Multifocal pneumonia. CTA chest with BL patchy airspace opacities, predominantly in the lower lobes, R>L c/w multifocal PNA and small L pleural effusion likely in setting of known COVID with superimposed bacterial PNA procal 1.49 s/p CTX/azithromycin x7d total course  BCx NGTD. Mediastinal mass. CTA chest with large 7.8cm heterogenous structure with internal and peripheral calcification within the middle mediastinum splaying the trachea and esophagus, exerting mass effect on both; c/f mediastinal mass vs. vascular structure or aneurysm. CT Sx consulted. CT chest showed stable middle mediastinal mass, ectopic thyroid is a consideration. Patient to follow up with CT surgery as outpatient to discuss further w/up and possible surgical plans. Adrenal nodule. CTA chest with indeterminate 2.6cm L adrenal nodule outpt f/u. HTN (hypertension). home meds: coreg 25mg BID, nifedipine 60mg BID, and telmisartan-HCTZ 80-12.5mg daily  c/w coreg, losartan, and nifedipine 60mg BID monitor BP. HLD (hyperlipidemia). home med: atorvastatin 40mg daily  c/w home med. Type 2 diabetes mellitus. home med: metformin 1g BID, levemir 44U at bedtime. A1C 6.5  course c/b steroid induced hyperglycemia, Decadron discontinued c/w Lantus 50U bedtime c/w Admelog 15U TID pre-meals c/w mISS  discharge meds as per Endo. Thyroid, ectopic. pt with hx ectopic thyroid s/p sternotomy with partial thyroidectomy; on synthroid 50mcg daily to "suppress" additional thyroid growth c/w synthroid." The patient was discharged home in stable condition with home care services and follow up care.\par \par PNA, COVID: stable\par Continue established treatment plan with follow up\par Continue turn, cough and deep breathing exercises\par Montior for signs and symptoms of infection \par Reviewed when to call medical provider if symptoms worsen \par Follow up with PCP\par \par Mediastinal Mass: stable\par Continue established treatment plan with follow up\par Monitor for worsening  signs and symptoms  and reviewed when to call medical provider\par Pt verbalized good understanding\par Follow up with Medical providers: PCP, Thoracic Surgery\par \par DM: stable\par Continue established treatment plan with follow up\par Continue Insulin and Metformin\par Monitor for signs and symptoms of hypo/hyperglycemia\par Monitor FS\par Follow up with PCP, Endocrinology\par \par HTN: stable\par Continue established treatment plan with follow up\par Continue BP meds: Nifedipine, CV meds\par Monitor for worsening  signs and symptoms  and reviewed when to call medical provider\par Pt verbalized good understanding\par Follow up with PCP, Cardiology\par \par HLD: stable\par Continue established treatment plan with follow up\par Continue Liptior\par Lipid panel and bloodwork follow up as per PCP\par Pt verbalized good understanding\par Follow up with Medical providers: PCP, Cardiology\par \par Thyroid disease: stable\par Continue established treatment plan with follow up\par Continue Levothyroxine\par Monitor for worsening  signs and symptoms  and reviewed when to call medical provider\par Pt verbalized good understanding\par Follow up with Medical providers: PCP, Endocrinology\par \par Pt will need continued Home Care Services RN\par This patient is Enrolled in the [facility] Follow Up Program through [employer]\par Recommended  referral to assist with PCP follow up appointment\par CN reviewed with the pt that pt is under the [facility] program for home care until pt is seen by the PCP.    Reviewed with pt and Home Care RN if symptoms worsen pt will need to call 911 or EMS to be evaluated at local ED.  Pt and Home Care RN verbalized good understanding.\par CN will be assigned to sign Home Care orders post-discharge.\par \par \par

## 2023-01-24 NOTE — PHYSICAL EXAM
[No Acute Distress] : no acute distress [Well Nourished] : well nourished [Normal Sclera/Conjunctiva] : normal sclera/conjunctiva [Normal Outer Ear/Nose] : the outer ears and nose were normal in appearance [No JVD] : no jugular venous distention [No Respiratory Distress] : no respiratory distress  [No Accessory Muscle Use] : no accessory muscle use [Non Tender] : non-tender [de-identified] : Limited Visual Physical Exam during TeleHealth Visit [de-identified] : Awake and alert [de-identified] : Calm

## 2023-01-24 NOTE — HISTORY OF PRESENT ILLNESS
[Home] : at home, [unfilled] , at the time of the visit. [Other Location: e.g. Home (Enter Location, City,State)___] : at [unfilled] [Other:____] : [unfilled] [Verbal consent obtained from patient] : the patient, [unfilled] [FreeTextEntry1] : Follow up post discharge s/p recent hospitalization\par  [de-identified] : This patient is Enrolled in the Post-Discharge [facility] Follow Up Program through [facility] Copied Copied As per [facility] Discharge Summary\par \par "77 year old Female with PMH HTN, HLD, T2D, and partial thyroidectomy p/t OSH with worsening weakness, SOB, hypoxia, and known COVID+ (urgent care 12/18). Found to have COVID with superimposed multifocal PNA and mediastinal mass. Transferred to [facility] for CT Sx evaluation. Acute hypoxemic respiratory failure due to COVID-19. Acute hypoxic respiratory failure d/t COVID pneumonia, had required HFNC but then transitioned back to nasal cannula. OSH CTA chest neg for PE but with BL GGO and MF PNA as below COVID+ with procal 1.49 s/p Remdesivir and Decadron,  PRN duonebs.  Multifocal pneumonia. CTA chest with BL patchy airspace opacities, predominantly in the lower lobes, R>L c/w multifocal PNA and small L pleural effusion likely in setting of known COVID with superimposed bacterial PNA \par procal 1.49 s/p CTX/azithromycin x7d total course  BCx NGTD. Mediastinal mass. CTA chest with large 7.8cm heterogenous structure with internal and peripheral calcification within the middle mediastinum splaying the trachea and esophagus, exerting mass effect on both; c/f mediastinal mass vs. vascular structure or aneurysm. CT Sx consulted. CT chest showed stable middle mediastinal mass, ectopic thyroid is a consideration. Patient to follow up with CT surgery as outpatient to discuss further w/up and possible surgical plans. Adrenal nodule. CTA chest with indeterminate 2.6cm L adrenal nodule outpt f/u. HTN (hypertension). home meds: coreg 25mg BID, nifedipine 60mg BID, and telmisartan-HCTZ 80-12.5mg daily  c/w coreg, losartan, and nifedipine 60mg BID monitor BP. HLD (hyperlipidemia). home med: atorvastatin 40mg daily  c/w home med. Type 2 diabetes mellitus. home med: metformin 1g BID, levemir 44U at bedtime. A1C 6.5  course c/b steroid induced hyperglycemia, Decadron discontinued c/w Lantus 50U bedtime c/w Admelog 15U TID pre-meals c/w mISS  discharge meds as per Endo. Thyroid, ectopic. pt with hx ectopic thyroid s/p sternotomy with partial thyroidectomy; on synthroid 50mcg daily to "suppress" additional thyroid growth c/w synthroid." The patient was discharged home in stable condition with home care services and follow up care.\par \par \par During the TeleHealth the patient was in no acute distress.  Able to speak with complete sentences and no audible wheeze noted.\par The patient denies chest pain, shortness of breath, cough, hemoptysis, fever, palpitations, syncope\par \par CN reviewed that pt  is under the [facility] program for home care until pt is seen by  PCP.   CN will be assigned to sign Home Care orders post-discharge.\par

## 2023-01-28 ENCOUNTER — OUTPATIENT (OUTPATIENT)
Dept: OUTPATIENT SERVICES | Facility: HOSPITAL | Age: 78
LOS: 1 days | End: 2023-01-28
Payer: MEDICARE

## 2023-01-28 ENCOUNTER — APPOINTMENT (OUTPATIENT)
Dept: CT IMAGING | Facility: IMAGING CENTER | Age: 78
End: 2023-01-28
Payer: MEDICARE

## 2023-01-28 DIAGNOSIS — E89.0 POSTPROCEDURAL HYPOTHYROIDISM: Chronic | ICD-10-CM

## 2023-01-28 DIAGNOSIS — J98.59 OTHER DISEASES OF MEDIASTINUM, NOT ELSEWHERE CLASSIFIED: ICD-10-CM

## 2023-01-28 PROCEDURE — 70491 CT SOFT TISSUE NECK W/DYE: CPT | Mod: 26

## 2023-01-28 PROCEDURE — 70492 CT SFT TSUE NCK W/O & W/DYE: CPT

## 2023-02-06 ENCOUNTER — APPOINTMENT (OUTPATIENT)
Dept: THORACIC SURGERY | Facility: CLINIC | Age: 78
End: 2023-02-06
Payer: MEDICARE

## 2023-02-06 VITALS
DIASTOLIC BLOOD PRESSURE: 64 MMHG | BODY MASS INDEX: 30.73 KG/M2 | OXYGEN SATURATION: 97 % | SYSTOLIC BLOOD PRESSURE: 142 MMHG | WEIGHT: 180 LBS | HEART RATE: 67 BPM | RESPIRATION RATE: 16 BRPM | HEIGHT: 64 IN

## 2023-02-06 PROCEDURE — 99214 OFFICE O/P EST MOD 30 MIN: CPT

## 2023-02-09 NOTE — CONSULT LETTER
[FreeTextEntry2] : discharged from [facility]\par Dr. Strickland (Cardiology) [FreeTextEntry3] : Heraclio Barnhart MD \par Attending Surgeon \par Division of Thoracic Surgery \par , Cardiovascular and Thoracic Surgery \par [facility] [employer] [facility]\par

## 2023-02-09 NOTE — ASSESSMENT
[FreeTextEntry1] : Ms. 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 Patient p/w mediastinal mass.\par \par I have reviewed the patient's medical records and diagnostic images at time of this office consultation and have made the following recommendation:\par 1. CT parathyroid reviewed with patient. I think the findings are residual of thyroid tissue post the partial thyroidectomy. I recommend she obtains NM Thyroid scan for further evaluation and return to office after to discuss result. \par 2. I advised patient to consult with ENT Dr. Jorge Escalona. She will be making an appointment with his office.\par \par \par I, ELAINE Harris, personally performed the evaluation and management (E/M) services for this established patient. That E/M includes conducting the examination, assessing all new/exacerbated conditions, and establishing a new plan of care. Today, my ACP, William Blank NP, was here to observe my evaluation and management services for this new problem/exacerbated condition to be followed going forward.\par \par  \par

## 2023-02-09 NOTE — HISTORY OF PRESENT ILLNESS
[FreeTextEntry1] : Ms. JIMMIE WELCH, 77 year old female, former smoker (1/4 ppd x 10 yrs, quit 1982), w/ hx of HTN, HLD, T2D, and ectopic thyroid s/p sternotomy with partial thyroidectomy (~25 years ago) with worsening weakness, SOB, hypoxia, and known COVID+ (urgent care 12/18) s/p Remdesivir and Decadron. Found to have COVID with superimposed multifocal PNA s/p CTX/azithromycin x7d total course and mediastinal mass. Transferred to [facility] for CT Sx evaluation. \par \par CTA on 12/21/2022:\par - Large 7.8 cm heterogeneous structure with internal and peripheral calcification within the middle mediastinum which splays the trachea and esophagus, exerting mass effect on both. Differential includes mediastinal mass, however vascular structure/aneurysm cannot be excluded. CT with noncontrast, arterial and delayed phase imaging is recommended.\par - No pulmonary embolism identified.\par - Bilateral patchy airspace opacities, predominantly in the lower lobes, right greater than left, compatible with multifocal pneumonia. Small left pleural effusion.\par - Indeterminant 2.6 cm left adrenal nodule.\par \par CTA on 12/27/2022:\par - There is again a 6.6 x 6.9 x 6 cm middle mediastinal mass which contains coarse calcifications and there is hyperattenuating and low-attenuation components on the precontrast images. There is robust enhancement of the hyperattenuating components and areas of linear enhancement within the hypoattenuating which is more conspicuous on the delayed images. The mass is located between the trachea and esophagus and displaces and compresses the trachea anteriorly and compresses and obliterates the esophageal lumen posteriorly and does not appear to arise from either. There is no associated lymphadenopathy. The mass is separate from the thyroid gland although this does not exclude ectopic thyroid, particularly given imaging features. The right thyroid lobe is enlarged containing a coarse calcification and areas of ill-defined low-attenuation.\par - There is a stable 5 mm right upper lobe groundglass nodule image 200 series 6. In There is compression into the medial right upper lobe by the mediastinal mass as well as compression and anterior displacement of trachea although it remains widely patent. There are stable small pleural effusions.\par \par PFTs on 01/23/2023: FVC 1.70 (79%), FEV1 1.24 (75%), DLCO 67%. \par \par CT 4D parathyroid on 01/28/2023:\par -  Enhancing nodule within the hemithyroidectomy bed to the left of the trachea suspicious for a parathyroid adenoma although this may represent a focus of residual thyroid tissue.\par - Large partially calcified mediastinal mass similar in appearance to the prior exam likely representing ectopic thyroid tissue with associated tracheal and esophageal compression.\par \par Consult with ENT Dr. Jorge Escalona???\par \par Patient is here today for a follow up. Overall, she reports to be feeling well. Denies any chest pain, shortness of breath, cough, or hemoptysis.

## 2023-02-11 ENCOUNTER — LABORATORY RESULT (OUTPATIENT)
Age: 78
End: 2023-02-11

## 2023-02-11 LAB
T3FREE SERPL-MCNC: 3.36 PG/ML
T3RU NFR SERPL: 1 TBI
T4 FREE SERPL-MCNC: 1.6 NG/DL
T4 SERPL-MCNC: 10.4 UG/DL
TSH SERPL-ACNC: 0.15 UIU/ML

## 2023-03-07 ENCOUNTER — APPOINTMENT (OUTPATIENT)
Dept: OTOLARYNGOLOGY | Facility: CLINIC | Age: 78
End: 2023-03-07
Payer: MEDICARE

## 2023-03-07 VITALS
OXYGEN SATURATION: 97 % | WEIGHT: 180 LBS | HEIGHT: 64 IN | SYSTOLIC BLOOD PRESSURE: 157 MMHG | BODY MASS INDEX: 30.73 KG/M2 | DIASTOLIC BLOOD PRESSURE: 70 MMHG | HEART RATE: 86 BPM

## 2023-03-07 DIAGNOSIS — J98.59 OTHER DISEASES OF MEDIASTINUM, NOT ELSEWHERE CLASSIFIED: ICD-10-CM

## 2023-03-07 PROCEDURE — 99203 OFFICE O/P NEW LOW 30 MIN: CPT

## 2023-03-07 NOTE — HISTORY OF PRESENT ILLNESS
[None] : No associated symptoms are reported. [de-identified] : Ms. Herndon is a 78 yo female being referred by Dr. Barnhart for parathyroid evaluation. Mediastinum mass noted on a CXR on 12/2022 when she had breathing difficulties and was diagnosed with COVID. She was then referred to Dr. Barnhart.  \par hx ectopic thyroid s/p sternotomy with partial thyroidectomy about in 1996. \par On Levothyroxine 50 mcg daily,\par 1/28/2023 CT4D scan showing enhancing nodule within the hemithyroidectomy bed to the left of the trachea suspicious for a parathyroid adenoma although this may represent a focus of residual thyroid tissue.\par Large partially calcified mediastinal mass similar in appearance to the prior exam likely representing ectopic thyroid tissue with associated tracheal and esophageal compression.\par appt. 3/20 NM thyroid scan scheduled\par 12/2022 calcium 8.7\par Denies kidney stone, no hx of bone fracture, or trouble with concentration. Occasional constipation. \par Dneies family hisotry of parathyroid issues . \par Smoked cigarettes (not heavy smoker) for about 10 years, quit  45 years ago. oalcohol intake\par

## 2023-03-07 NOTE — CONSULT LETTER
[Dear  ___] : Dear  [unfilled], [Consult Letter:] : I had the pleasure of evaluating your patient, [unfilled]. [Please see my note below.] : Please see my note below. [Consult Closing:] : Thank you very much for allowing me to participate in the care of this patient.  If you have any questions, please do not hesitate to contact me. [Sincerely,] : Sincerely, [FreeTextEntry2] : Heraclio Barnhart MD [FreeTextEntry3] : Jorge Escalona MD, FACS\par \par    [facility] [place]\par Associate Chair\par    Department of Otolaryngology\par \par Professor\par Otolaryngology & Molecular Medicine\par [facility] [employer]\par

## 2023-03-20 ENCOUNTER — APPOINTMENT (OUTPATIENT)
Dept: NUCLEAR MEDICINE | Facility: HOSPITAL | Age: 78
End: 2023-03-20

## 2023-03-21 ENCOUNTER — APPOINTMENT (OUTPATIENT)
Dept: NUCLEAR MEDICINE | Facility: HOSPITAL | Age: 78
End: 2023-03-21

## 2024-07-29 ENCOUNTER — INPATIENT (INPATIENT)
Facility: HOSPITAL | Age: 79
LOS: 3 days | Discharge: HOME HEALTH | End: 2024-08-02
Attending: INTERNAL MEDICINE | Admitting: INTERNAL MEDICINE
Payer: MEDICARE

## 2024-07-29 VITALS
SYSTOLIC BLOOD PRESSURE: 148 MMHG | OXYGEN SATURATION: 91 % | DIASTOLIC BLOOD PRESSURE: 68 MMHG | RESPIRATION RATE: 22 BRPM | TEMPERATURE: 100 F | HEIGHT: 64 IN | WEIGHT: 190.04 LBS | HEART RATE: 97 BPM

## 2024-07-29 DIAGNOSIS — U07.1 COVID-19: ICD-10-CM

## 2024-07-29 DIAGNOSIS — I10 ESSENTIAL (PRIMARY) HYPERTENSION: ICD-10-CM

## 2024-07-29 DIAGNOSIS — E89.0 POSTPROCEDURAL HYPOTHYROIDISM: Chronic | ICD-10-CM

## 2024-07-29 DIAGNOSIS — E78.5 HYPERLIPIDEMIA, UNSPECIFIED: ICD-10-CM

## 2024-07-29 DIAGNOSIS — E11.9 TYPE 2 DIABETES MELLITUS WITHOUT COMPLICATIONS: ICD-10-CM

## 2024-07-29 DIAGNOSIS — J18.9 PNEUMONIA, UNSPECIFIED ORGANISM: ICD-10-CM

## 2024-07-29 DIAGNOSIS — J96.01 ACUTE RESPIRATORY FAILURE WITH HYPOXIA: ICD-10-CM

## 2024-07-29 LAB
ALBUMIN SERPL ELPH-MCNC: 3.3 G/DL — SIGNIFICANT CHANGE UP (ref 3.3–5)
ALP SERPL-CCNC: 86 U/L — SIGNIFICANT CHANGE UP (ref 40–120)
ALT FLD-CCNC: 19 U/L — SIGNIFICANT CHANGE UP (ref 12–78)
ANION GAP SERPL CALC-SCNC: 7 MMOL/L — SIGNIFICANT CHANGE UP (ref 5–17)
AST SERPL-CCNC: 12 U/L — LOW (ref 15–37)
BASOPHILS # BLD AUTO: 0.01 K/UL — SIGNIFICANT CHANGE UP (ref 0–0.2)
BASOPHILS NFR BLD AUTO: 0.1 % — SIGNIFICANT CHANGE UP (ref 0–2)
BILIRUB SERPL-MCNC: 0.9 MG/DL — SIGNIFICANT CHANGE UP (ref 0.2–1.2)
BUN SERPL-MCNC: 20 MG/DL — SIGNIFICANT CHANGE UP (ref 7–23)
CALCIUM SERPL-MCNC: 9.1 MG/DL — SIGNIFICANT CHANGE UP (ref 8.5–10.1)
CHLORIDE SERPL-SCNC: 102 MMOL/L — SIGNIFICANT CHANGE UP (ref 96–108)
CO2 SERPL-SCNC: 24 MMOL/L — SIGNIFICANT CHANGE UP (ref 22–31)
CREAT SERPL-MCNC: 1.18 MG/DL — SIGNIFICANT CHANGE UP (ref 0.5–1.3)
EGFR: 47 ML/MIN/1.73M2 — LOW
EOSINOPHIL # BLD AUTO: 0.01 K/UL — SIGNIFICANT CHANGE UP (ref 0–0.5)
EOSINOPHIL NFR BLD AUTO: 0.1 % — SIGNIFICANT CHANGE UP (ref 0–6)
FLUAV AG NPH QL: SIGNIFICANT CHANGE UP
FLUBV AG NPH QL: SIGNIFICANT CHANGE UP
GLUCOSE BLDC GLUCOMTR-MCNC: 172 MG/DL — HIGH (ref 70–99)
GLUCOSE BLDC GLUCOMTR-MCNC: 216 MG/DL — HIGH (ref 70–99)
GLUCOSE SERPL-MCNC: 137 MG/DL — HIGH (ref 70–99)
HCT VFR BLD CALC: 32.3 % — LOW (ref 34.5–45)
HGB BLD-MCNC: 11.4 G/DL — LOW (ref 11.5–15.5)
IMM GRANULOCYTES NFR BLD AUTO: 0.1 % — SIGNIFICANT CHANGE UP (ref 0–0.9)
LACTATE SERPL-SCNC: 1.5 MMOL/L — SIGNIFICANT CHANGE UP (ref 0.7–2)
LYMPHOCYTES # BLD AUTO: 0.77 K/UL — LOW (ref 1–3.3)
LYMPHOCYTES # BLD AUTO: 9.4 % — LOW (ref 13–44)
MCHC RBC-ENTMCNC: 29.7 PG — SIGNIFICANT CHANGE UP (ref 27–34)
MCHC RBC-ENTMCNC: 35.3 G/DL — SIGNIFICANT CHANGE UP (ref 32–36)
MCV RBC AUTO: 84.1 FL — SIGNIFICANT CHANGE UP (ref 80–100)
MONOCYTES # BLD AUTO: 0.52 K/UL — SIGNIFICANT CHANGE UP (ref 0–0.9)
MONOCYTES NFR BLD AUTO: 6.3 % — SIGNIFICANT CHANGE UP (ref 2–14)
NEUTROPHILS # BLD AUTO: 6.9 K/UL — SIGNIFICANT CHANGE UP (ref 1.8–7.4)
NEUTROPHILS NFR BLD AUTO: 84 % — HIGH (ref 43–77)
NRBC # BLD: 0 /100 WBCS — SIGNIFICANT CHANGE UP (ref 0–0)
PLATELET # BLD AUTO: 152 K/UL — SIGNIFICANT CHANGE UP (ref 150–400)
POTASSIUM SERPL-MCNC: 4.7 MMOL/L — SIGNIFICANT CHANGE UP (ref 3.5–5.3)
POTASSIUM SERPL-SCNC: 4.7 MMOL/L — SIGNIFICANT CHANGE UP (ref 3.5–5.3)
PROT SERPL-MCNC: 7.4 GM/DL — SIGNIFICANT CHANGE UP (ref 6–8.3)
RBC # BLD: 3.84 M/UL — SIGNIFICANT CHANGE UP (ref 3.8–5.2)
RBC # FLD: 12.6 % — SIGNIFICANT CHANGE UP (ref 10.3–14.5)
SARS-COV-2 RNA SPEC QL NAA+PROBE: DETECTED
SODIUM SERPL-SCNC: 133 MMOL/L — LOW (ref 135–145)
WBC # BLD: 8.22 K/UL — SIGNIFICANT CHANGE UP (ref 3.8–10.5)
WBC # FLD AUTO: 8.22 K/UL — SIGNIFICANT CHANGE UP (ref 3.8–10.5)

## 2024-07-29 PROCEDURE — 99285 EMERGENCY DEPT VISIT HI MDM: CPT

## 2024-07-29 PROCEDURE — 71045 X-RAY EXAM CHEST 1 VIEW: CPT | Mod: 26

## 2024-07-29 PROCEDURE — 99497 ADVNCD CARE PLAN 30 MIN: CPT | Mod: 25

## 2024-07-29 PROCEDURE — 99223 1ST HOSP IP/OBS HIGH 75: CPT

## 2024-07-29 PROCEDURE — 93010 ELECTROCARDIOGRAM REPORT: CPT

## 2024-07-29 RX ORDER — GLUCAGON INJECTION, SOLUTION 0.5 MG/.1ML
1 INJECTION, SOLUTION SUBCUTANEOUS ONCE
Refills: 0 | Status: DISCONTINUED | OUTPATIENT
Start: 2024-07-29 | End: 2024-08-02

## 2024-07-29 RX ORDER — DORZOLAMIDE HYDROCHLORIDE TIMOLOL MALEATE 20; 5 MG/ML; MG/ML
1 SOLUTION/ DROPS OPHTHALMIC
Refills: 0 | Status: DISCONTINUED | OUTPATIENT
Start: 2024-07-29 | End: 2024-08-02

## 2024-07-29 RX ORDER — LATANOPROST 0.005 %
1 DROPS OPHTHALMIC (EYE) AT BEDTIME
Refills: 0 | Status: DISCONTINUED | OUTPATIENT
Start: 2024-07-29 | End: 2024-08-02

## 2024-07-29 RX ORDER — LEVOTHYROXINE SODIUM 175 MCG
50 TABLET ORAL DAILY
Refills: 0 | Status: DISCONTINUED | OUTPATIENT
Start: 2024-07-29 | End: 2024-08-02

## 2024-07-29 RX ORDER — MAGNESIUM, ALUMINUM HYDROXIDE 200-225/5
30 SUSPENSION, ORAL (FINAL DOSE FORM) ORAL EVERY 4 HOURS
Refills: 0 | Status: DISCONTINUED | OUTPATIENT
Start: 2024-07-29 | End: 2024-08-02

## 2024-07-29 RX ORDER — DEXTROSE MONOHYDRATE, SODIUM CHLORIDE, SODIUM LACTATE, CALCIUM CHLORIDE, MAGNESIUM CHLORIDE 1.5; 538; 448; 18.4; 5.08 G/100ML; MG/100ML; MG/100ML; MG/100ML; MG/100ML
2600 SOLUTION INTRAPERITONEAL ONCE
Refills: 0 | Status: COMPLETED | OUTPATIENT
Start: 2024-07-29 | End: 2024-07-29

## 2024-07-29 RX ORDER — ACETAMINOPHEN 500 MG
975 TABLET ORAL ONCE
Refills: 0 | Status: DISCONTINUED | OUTPATIENT
Start: 2024-07-29 | End: 2024-08-02

## 2024-07-29 RX ORDER — INSULIN LISPRO 100/ML
VIAL (ML) SUBCUTANEOUS AT BEDTIME
Refills: 0 | Status: DISCONTINUED | OUTPATIENT
Start: 2024-07-29 | End: 2024-08-02

## 2024-07-29 RX ORDER — DEXAMETHASONE 1.5 MG/1
6 TABLET ORAL ONCE
Refills: 0 | Status: COMPLETED | OUTPATIENT
Start: 2024-07-29 | End: 2024-07-29

## 2024-07-29 RX ORDER — DEXTROSE 4 G
12.5 TABLET,CHEWABLE ORAL ONCE
Refills: 0 | Status: DISCONTINUED | OUTPATIENT
Start: 2024-07-29 | End: 2024-08-02

## 2024-07-29 RX ORDER — INSULIN LISPRO 100/ML
VIAL (ML) SUBCUTANEOUS
Refills: 0 | Status: DISCONTINUED | OUTPATIENT
Start: 2024-07-29 | End: 2024-08-01

## 2024-07-29 RX ORDER — DEXTROSE 4 G
25 TABLET,CHEWABLE ORAL ONCE
Refills: 0 | Status: DISCONTINUED | OUTPATIENT
Start: 2024-07-29 | End: 2024-08-02

## 2024-07-29 RX ORDER — DEXTROSE MONOHYDRATE, SODIUM CHLORIDE, SODIUM LACTATE, CALCIUM CHLORIDE, MAGNESIUM CHLORIDE 1.5; 538; 448; 18.4; 5.08 G/100ML; MG/100ML; MG/100ML; MG/100ML; MG/100ML
1000 SOLUTION INTRAPERITONEAL
Refills: 0 | Status: DISCONTINUED | OUTPATIENT
Start: 2024-07-29 | End: 2024-08-02

## 2024-07-29 RX ORDER — SPIRONOLACTONE 50 MG/1
1 TABLET, FILM COATED ORAL
Refills: 0 | DISCHARGE

## 2024-07-29 RX ORDER — REMDESIVIR 100 MG/1
200 INJECTION, POWDER, LYOPHILIZED, FOR SOLUTION INTRAVENOUS EVERY 24 HOURS
Refills: 0 | Status: COMPLETED | OUTPATIENT
Start: 2024-07-29 | End: 2024-07-29

## 2024-07-29 RX ORDER — ONDANSETRON HCL/PF 4 MG/2 ML
4 VIAL (ML) INJECTION EVERY 8 HOURS
Refills: 0 | Status: DISCONTINUED | OUTPATIENT
Start: 2024-07-29 | End: 2024-08-02

## 2024-07-29 RX ORDER — FUROSEMIDE 10 MG/ML
40 INJECTION, SOLUTION INTRAVENOUS ONCE
Refills: 0 | Status: COMPLETED | OUTPATIENT
Start: 2024-07-29 | End: 2024-07-30

## 2024-07-29 RX ORDER — ENOXAPARIN SODIUM 120 MG/.8ML
40 INJECTION SUBCUTANEOUS EVERY 24 HOURS
Refills: 0 | Status: DISCONTINUED | OUTPATIENT
Start: 2024-07-29 | End: 2024-07-29

## 2024-07-29 RX ORDER — ATORVASTATIN CALCIUM 40 MG/1
40 TABLET, FILM COATED ORAL AT BEDTIME
Refills: 0 | Status: DISCONTINUED | OUTPATIENT
Start: 2024-07-29 | End: 2024-08-02

## 2024-07-29 RX ORDER — CARVEDILOL PHOSPHATE 80 MG
25 CAPSULE,EXTENDED RELEASE MULTIPHASE 24HR ORAL EVERY 12 HOURS
Refills: 0 | Status: DISCONTINUED | OUTPATIENT
Start: 2024-07-29 | End: 2024-08-02

## 2024-07-29 RX ORDER — REMDESIVIR 100 MG/1
100 INJECTION, POWDER, LYOPHILIZED, FOR SOLUTION INTRAVENOUS EVERY 24 HOURS
Refills: 0 | Status: DISCONTINUED | OUTPATIENT
Start: 2024-07-30 | End: 2024-08-02

## 2024-07-29 RX ORDER — AZITHROMYCIN 250 MG
500 TABLET ORAL ONCE
Refills: 0 | Status: COMPLETED | OUTPATIENT
Start: 2024-07-29 | End: 2024-07-29

## 2024-07-29 RX ORDER — DORZOLAMIDE HYDROCHLORIDE TIMOLOL MALEATE 20; 5 MG/ML; MG/ML
1 SOLUTION/ DROPS OPHTHALMIC
Refills: 0 | DISCHARGE

## 2024-07-29 RX ORDER — ACETAMINOPHEN 500 MG
650 TABLET ORAL EVERY 6 HOURS
Refills: 0 | Status: DISCONTINUED | OUTPATIENT
Start: 2024-07-29 | End: 2024-08-02

## 2024-07-29 RX ORDER — DEXAMETHASONE 1.5 MG/1
6 TABLET ORAL DAILY
Refills: 0 | Status: DISCONTINUED | OUTPATIENT
Start: 2024-07-30 | End: 2024-07-30

## 2024-07-29 RX ORDER — ENOXAPARIN SODIUM 120 MG/.8ML
40 INJECTION SUBCUTANEOUS EVERY 12 HOURS
Refills: 0 | Status: DISCONTINUED | OUTPATIENT
Start: 2024-07-29 | End: 2024-08-02

## 2024-07-29 RX ORDER — INSULIN GLARGINE-YFGN 100 [IU]/ML
39 INJECTION, SOLUTION SUBCUTANEOUS EVERY MORNING
Refills: 0 | Status: DISCONTINUED | OUTPATIENT
Start: 2024-07-30 | End: 2024-08-02

## 2024-07-29 RX ORDER — AZITHROMYCIN 250 MG
500 TABLET ORAL EVERY 24 HOURS
Refills: 0 | Status: DISCONTINUED | OUTPATIENT
Start: 2024-07-30 | End: 2024-07-31

## 2024-07-29 RX ORDER — REMDESIVIR 100 MG/1
INJECTION, POWDER, LYOPHILIZED, FOR SOLUTION INTRAVENOUS
Refills: 0 | Status: DISCONTINUED | OUTPATIENT
Start: 2024-07-29 | End: 2024-08-02

## 2024-07-29 RX ORDER — DEXTROSE 4 G
15 TABLET,CHEWABLE ORAL ONCE
Refills: 0 | Status: DISCONTINUED | OUTPATIENT
Start: 2024-07-29 | End: 2024-08-02

## 2024-07-29 RX ORDER — MELATONIN 3 MG
3 TABLET ORAL AT BEDTIME
Refills: 0 | Status: DISCONTINUED | OUTPATIENT
Start: 2024-07-29 | End: 2024-08-02

## 2024-07-29 RX ORDER — LOSARTAN POTASSIUM 50 MG/1
100 TABLET, FILM COATED ORAL
Refills: 0 | Status: DISCONTINUED | OUTPATIENT
Start: 2024-07-29 | End: 2024-08-02

## 2024-07-29 RX ADMIN — Medication 100 MILLIGRAM(S): at 22:20

## 2024-07-29 RX ADMIN — DEXAMETHASONE 6 MILLIGRAM(S): 1.5 TABLET ORAL at 22:16

## 2024-07-29 RX ADMIN — Medication 255 MILLIGRAM(S): at 23:02

## 2024-07-29 RX ADMIN — DEXTROSE MONOHYDRATE, SODIUM CHLORIDE, SODIUM LACTATE, CALCIUM CHLORIDE, MAGNESIUM CHLORIDE 2600 MILLILITER(S): 1.5; 538; 448; 18.4; 5.08 SOLUTION INTRAPERITONEAL at 22:21

## 2024-07-29 NOTE — ED PROVIDER NOTE - PROGRESS NOTE DETAILS
MD Yana (PGY-3) Patient requesting to be transferred to [facility]. Patient follows with Reg Hernandez, [facility] physician. Reaching out to inpatient [facility] to assess whether they would like to transfer to [facility].

## 2024-07-29 NOTE — ED ADULT TRIAGE NOTE - HEIGHT IN CM
Pt has been scheduled for a one yr f/u with Dr. Brian in May 2021. Writer did verify he does have [employer] insurance. Writer informed pt to bring insurance card to be scanned into the chart. Pt stated he understood.  
[phone]

## 2024-07-29 NOTE — H&P ADULT - NSHPPHYSICALEXAM_GEN_ALL_CORE
PHYSICAL EXAM:  GENERAL: NAD, comfortable at bedside   HEAD:  Atraumatic, Normocephalic  EYES: EOMI, PERRL, conjunctiva and sclera clear  NECK: Supple, No JVD  CHEST/LUNG: Clear to auscultation bilaterally; No wheezes, rales or rhonchi  HEART: Regular rate and rhythm; No murmurs, rubs, or gallops, (+)S1, S2  ABDOMEN: Soft, Nontender, Nondistended; Normal Bowel sounds   EXTREMITIES:  2+ Peripheral Pulses, No clubbing, cyanosis, or edema  PSYCH: normal mood and affect  NEUROLOGY: AAOx3, moving all extremities spontaneously   SKIN: No rashes or lesions PHYSICAL EXAM:  GENERAL: NAD, comfortable at bedside   HEAD:  Atraumatic, Normocephalic  EYES: EOMI, PERRL, conjunctiva and sclera clear  NECK: Supple, No JVD  CHEST/LUNG: Clear to auscultation bilaterally; No wheezes, rales or rhonchi  HEART: Regular rate and rhythm; No murmurs, rubs, or gallops, (+)S1, S2  ABDOMEN: Soft, Nontender, Nondistended; Normal Bowel sounds   EXTREMITIES:  2+ Peripheral Pulses, No clubbing, cyanosis, 1+ to trace pedal edema   PSYCH: normal mood and affect  NEUROLOGY: AAOx3, moving all extremities spontaneously   SKIN: No rashes or lesions

## 2024-07-29 NOTE — ED PROVIDER NOTE - OBJECTIVE STATEMENT
78 yo female with PMhx PMH HTN, HLD, T2DM, and partial thyroidectomy with residual mediastinal mass, presenting with SOB, recently diagnosed with COVID yesterday. Denies fever, chills, nausea, vomiting, chest pain. Reports dyspnea, worse on exertion. Patient was noted to be hypoxic at home with home pulse at 88%. Patient hypoxic to 91% on room air in ED.

## 2024-07-29 NOTE — ED PROVIDER NOTE - CARE PLAN
1 Principal Discharge DX:	2019 novel coronavirus disease (COVID-19)   Principal Discharge DX:	2019 novel coronavirus disease (COVID-19)  Secondary Diagnosis:	Atypical pneumonia

## 2024-07-29 NOTE — ED ADULT NURSE NOTE - CAPILLARY REFILL
2 seconds or less
Additional Notes: Exacerbated by TCS per pt
Render Risk Assessment In Note?: no
Detail Level: Simple

## 2024-07-29 NOTE — H&P ADULT - PROBLEM SELECTOR PLAN 5
Chronic moderate exacerbation     Home regimen: basal 48 units daily, metformin 1g BID  Continue with basal 39 units daily + LDCS  A1c and lipid panel in AM

## 2024-07-29 NOTE — ED PROVIDER NOTE - CLINICAL SUMMARY MEDICAL DECISION MAKING FREE TEXT BOX
78 yo female with PMhx PMH HTN, HLD, T2DM, and partial thyroidectomy with residual mediastinal mass, presenting with SOB, recently diagnosed with COVID yesterday. Vitals with mild hypoxia to 91. Physical exam with mild bibasilar crackles. Will get labs including cbc, cmp, flu with covid, and CXR.

## 2024-07-29 NOTE — ED ADULT NURSE NOTE - ED STAT RN HANDOFF DETAILS
report given to QIAN Castillo. pt resting in stetcher on cardiac montior and 2L NC. 2 IVs patent and intact with fluids and ceftriazone running as per MD order. Urine to be collected. NADN att. VSS, respirations equal and unlabored. Isolation precautions maintained.

## 2024-07-29 NOTE — ED PROVIDER NOTE - ATTENDING CONTRIBUTION TO CARE
I independently evaluated the patient and discussed the case with Dr. Millan and I concur with her documentation as above. After I interpreted the cxr I ordered antibiotics more labs and urine. I also spoke to the admitting physician (Dr. Mccoy) about this patient.

## 2024-07-29 NOTE — H&P ADULT - PROBLEM SELECTOR PLAN 6
[facility]  Daily Note   Name:  Elvin Cordon  Medical Record Number: [id number]   Note Date: 2017                                              Date/Time:  2017 10:09:00   DOL: 32  Pos-Mens Age:  33wk 1d  Birth Gest: 28wk 4d   2017  Birth Weight:  1217 (gms)  Daily Physical Exam   Today's Weight: 2030 (gms)  Chg 24 hrs: 20  Chg 7 days:  269   Temperature Heart Rate Resp Rate BP - Sys BP - [place] BP - Mean O2 Sats   37.3 145 36 72 37 48 97  Intensive cardiac and respiratory monitoring, continuous and/or frequent vital sign monitoring.   Bed Type:  Incubator   Head/Neck:  Anterior fontanelle soft and flat.  Sutures overlapping.  HFNC in place.   Chest:  Clear breath sounds. Mild retractions consistent with degree of prematurity.  Mild intermittent  tachypnea.   Heart:  No murmur heard.  Brachial and femoral pulses 2+ and equal.   CFT brisk.   Abdomen:  Abdomen soft and rounded with active bowel sounds.   Genitalia:  Normal  external male genitalia.     Extremities  No abnormalities noted.    Neurologic:  Responsive with exam.  Muscle tone appropriate for gestation.    Skin:  Skin smooth, pink, warm, and intact.   Medications   Active Start Date Start Time Stop Date Dur(d) Comment   Caffeine Citrate 2017 33 q day per protocol  Glycerin - liquid 2017 33 PRN q 12 hours  Cholecalciferol 20170 units PO q day  Multivitamins with Iron 2017.5ml PO BID  Respiratory Support   Respiratory Support Start Date Stop Date Dur(d)                                       Comment   High Flow Nasal Cannula 2017 8  delivering CPAP  Settings for High Flow Nasal Cannula delivering CPAP  FiO2 Flow (lpm)  0.25 3  Intake/Output  Actual Intake   Fluid Type Johnnie/oz Dex % Prot g/kg Prot g/100mL Amount Comment  Breast Milk-Prolacta+6 26 304  Route: OG  Actual Fluid Calculations   Total mL/kg Total johnnie/kg Ent mL/kg IVF mL/kg IV Gluc mg/kg/min Total Prot g/kg Total  Fat g/kg      Planned Intake Prot Prot feeds/  Fluid Type Johnnie/oz Dex % g/kg g/100mL Amt mL/feed day mL/hr mL/kg/day Comment  Breast Milk-Prolacta+6 26 304 38 8 149  Planned Fluid Calculations   Total Total Ent IVF IV Gluc Total Prot Total Fat Total Na Total K Total [place] Ca Total [place] Phos    149 133 150 4.19 8.09 173.28 373.92  Output   Urine Amount:187 mL 3.8 mL/kg/hr Calculation:24 hrs  Fluid Type Amount mL Comment  Emesis  Total Output:   187 mL 3.8 mL/kg/hr 92.1 mL/kg/day Calculation:24 hrs  Stools: 2  Nutritional Support   Diagnosis Start Date End Date  Nutritional Support 2017   History   28.4 weeks.  AGA.  TPN started on admission.  BM feeds started on 7/26 per bedside protocol and held for 5 days.   Some hx of emesis and abd distensiion.  To 24 johnnie on 8/5.  To 26 johnnie on 8/11 and had abd distension with several feeds  and went back to 24 johnnie/oz.  Off TPN 8/17.  To 26 johnnie on 8/24   Assessment   Tolerating 26 johnnie MBM gavage feeds.  Weight up 20  grams.   Plan   Continue 26 johnnie prolacta again and increase volume per weight gain.  Check lytes again on Sunday  Give feed over 30 minutes.  Continue vitamins.  Respiratory Distress Syndrome   Diagnosis Start Date End Date  Respiratory Distress Syndrome 2017   History   Placed on bubble CPAP  5 with low oxygen needs at birth.  CXR with mild granularity. Increased WOB and tachypneic  at times >100 and surfactant give on 7/26.    Assessment   Oxygen needs 24-25% on 3L flow.  Mild tachypnea at times   Plan   Support, as indicated.  Keep on 3L flow until oxygen needs drop closer to room air.    Apnea   Diagnosis Start Date End Date  Apnea of Prematurity 2017   History   Some episodes, not frequent or severe,  Last undocumented event on 8/21-stim.   Assessment   No new events but on caffeine and HFNC   Plan   Continue caffeine  and  HFNC.    Feed over 30 minutes.  Anemia of Prematurity   Diagnosis Start Date End Date  Anemia of  Prematurity 2017   History   Never transfused.   hct 32%. Hct 29% on  on i-stat.    Plan   Follow Hcts and check retic with next lab  At risk for Intraventricular Hemorrhage   Diagnosis Start Date End Date  At risk for Intraventricular Hemorrhage 2017  Neuroimaging   Date Type Grade-L Grade-R   2017 Cranial Ultrasound No Bleed No Bleed  2017 Cranial Ultrasound No Bleed No Bleed   Plan   F/U on results  Prematurity 5220-1568 gm   Diagnosis Start Date End Date  Prematurity 0078-1894 gm 2017   History   28  weeks gestation.  Hepatitis B vaccine given at one month of age   Plan   Developmentally appropriate care and screenings.    Psychosocial Intervention   Diagnosis Start Date End Date  Psychosocial Intervention 2017   History   Consent obtained. Parents have a 3 year old. Mom is      Assessment   Mom in yesterday and attempted to nurse baby   Plan   Keep family involved and update when seen at bedside and prn.  At risk for Retinopathy of Prematurity   Diagnosis Start Date End Date  At risk for Retinopathy of Prematurity 2017  Retinal Exam   Date Stage - L Zone - L Stage - R Zone - R   2017   Plan   Obtain retcam exams per protocol.  F/U will be with [facility]   Maternal Labs  RPR/Serology: Non-Reactive  HIV: Negative  Rubella: Immune  GBS:  Negative  HBsAg:  Negative   [place] Screening   Date Comment    2017 Done normal except for OA profile on TPN  2017 Done normal T4, High TSH   Retinal Exam  Date Stage - L Zone - L Stage - R Zone - R Comment   2017   Immunization   Date Type Comment  2017 Done Hepatitis B  ___________________________________________ ___________________________________________  MD Christie Bridges, SUSHANT  Comment    As this patient`s attending physician, I provided on-site coordination of the healthcare team inclusive of the  advanced practitioner which included  patient assessment, directing the patient`s plan of care, and making decisions  regarding the patient`s management on this visit`s date of service as reflected in the documentation above.   Chronic stable  Continue atorvastatin 40mg daily   Lipid panel in AM

## 2024-07-29 NOTE — ED ADULT NURSE NOTE - NSFALLRISKINTERV_ED_ALL_ED

## 2024-07-29 NOTE — H&P ADULT - NSHPLABSRESULTS_GEN_ALL_CORE
11.4   8.22  )-----------( 152      ( 29 Jul 2024 19:25 )             32.3     133<L>  |  102  |  20  ----------------------------<  137<H>     07-29  4.7   |  24  |  1.18    Ca    9.1      29 Jul 2024 19:25    TPro  7.4  /  Alb  3.3  /  TBili  0.9  /  DBili  x   /  AST  12<L>  /  ALT  19  /  AlkPhos  86  07-29    EKG interpreted by myself: nonischemic  CXR interpreted by myself: multifocal pna

## 2024-07-29 NOTE — H&P ADULT - PROBLEM SELECTOR PLAN 4
Chronic stable   /89  Continue coreg 25mg BID   Hold nifedipine 60mg BID and aldactone 25mg daily as pt did not take BP meds other than coreg today   Restart as appropriate  Monitor Chronic stable   /89  Continue coreg 25mg BID and telmisartan-hctz 80-12.5mg daily formulary   Hold nifedipine 60mg BID and aldactone 25mg daily  Restart as appropriate  Monitor

## 2024-07-29 NOTE — H&P ADULT - HISTORY OF PRESENT ILLNESS
79 yr old female with a pmh of HTN, HLD, T2DM on insulin, and partial thyroidectomy who presents with progressive productive cough of white phlegm, CARVAJAL since Saturday. Presented to Urgent Care yesterday and dx with COVID-> prescribe plaxovid with no relief in symptoms.   Denies  headache, dizziness, chest pain, palpitations,  abdominal pain, joint pain, diarrhea/constipation, urinary symptoms.   Vitals: T99.5, HR 88, /89, RR 25 satting 95% on NC ((1% on RA)

## 2024-07-29 NOTE — H&P ADULT - PROBLEM SELECTOR PLAN 1
New  RR 25 satting 95% on NC (91% RA)  COVID +-> decadron and remdesivir   CXR: multifocal pna-> s/p rocephin and azitho-> continue   pct added on, MRSA, legionella  COVID order set with inflammatory markers   Wean O2 as tolerated New  RR 25 satting 95% on NC (91% RA)  COVID +-> decadron and remdesivir   CXR: multifocal pna-> s/p rocephin and azitho-> continue   pct added on, MRSA, legionella  COVID order set with inflammatory markers   Wean O2 as tolerated    Rapid response called fro increased SOB and desaturations- bibasilar crackles- lasix 40mg IV x1 ordered

## 2024-07-29 NOTE — ED ADULT NURSE NOTE - OBJECTIVE STATEMENT
pt a&ox4, ambulatory independently and with cane as per pt. Pt reports today with shortness of breath and chest congestion started Saturday. Went to urgent care yesterday where diagnosed with Covid. Pt reports difficulty breathing since diagnosed. Denies n/v/d, CP, blurry vision, lightheadedness, dizziness att. PMH - glaucoma, HTN. Pt placed on 2L NC and cardiac monitor. pt a&ox4, ambulatory independently and with cane as per pt. Pt reports today with shortness of breath and chest congestion started Saturday. Went to urgent care yesterday where diagnosed with Covid. Pt reports difficulty breathing worsening. Denies n/v/d, CP, blurry vision, lightheadedness, dizziness att. PMH - glaucoma, HTN. Pt placed on 2L NC and cardiac monitor.

## 2024-07-29 NOTE — H&P ADULT - NSICDXPASTMEDICALHX_GEN_ALL_CORE_FT
PAST MEDICAL HISTORY:  DM (diabetes mellitus)     Glaucoma     HLD (hyperlipidemia)     HTN (hypertension)     Mediastinal mass

## 2024-07-29 NOTE — ED PROVIDER NOTE - NS ED ATTENDING STATEMENT MOD
I have seen and examined this patient and fully participated in the care of this patient as the teaching attending.  The service was shared with the ANTHONY.  I reviewed and verified the documentation.

## 2024-07-29 NOTE — H&P ADULT - CONVERSATION DETAILS
Pt would like to remain FULL CODE.  If required, the pt would like the following interventions: CPR and/or intubation with mechanical ventilation.     Elizabeth Hernandez (daughter) is her HCP

## 2024-07-29 NOTE — ED PROVIDER NOTE - PHYSICAL EXAMINATION
Const: not in acute distress  Eyes: no conjunctival injection  HEENT: Head NCAT, Moist MM.  Neck: Trachea midline.   CVS: +S1/S2, No murmurs or gallops  RESP: Unlabored respiratory effort. Bibasilar crackles.  GI: Nontender/Nondistended, No CVA tenderness b/l.   Skin: Intact.   Neuro: moving all four extremities  Psych: Awake, Alert, & Cooperative

## 2024-07-29 NOTE — H&P ADULT - NSHPREVIEWOFSYSTEMS_GEN_ALL_CORE
REVIEW OF SYSTEMS:    CONSTITUTIONAL: No weakness, fevers or chills  EYES/ENT: No visual changes;  No dysphagia; No sore throat; No rhinorrhea; No sinus pain/pressure  NECK: No pain or stiffness  RESPIRATORY: + cough, no wheezing, hemoptysis; + shortness of breath  CARDIOVASCULAR: No chest pain or palpitations; No lower extremity edema  GASTROINTESTINAL: No abdominal or epigastric pain. No nausea, vomiting, or hematemesis; No diarrhea or constipation. No melena or hematochezia.  GENITOURINARY: No dysuria, frequency or hematuria  NEUROLOGICAL: No numbness or weakness  MSK: ambulates without assistance   SKIN: No itching, burning, rashes, or lesions   All other review of systems is negative unless indicated above.

## 2024-07-30 ENCOUNTER — TRANSCRIPTION ENCOUNTER (OUTPATIENT)
Age: 79
End: 2024-07-30

## 2024-07-30 LAB
A1C WITH ESTIMATED AVERAGE GLUCOSE RESULT: 6.9 % — HIGH (ref 4–5.6)
ALBUMIN SERPL ELPH-MCNC: 2.9 G/DL — LOW (ref 3.3–5)
ALP SERPL-CCNC: 78 U/L — SIGNIFICANT CHANGE UP (ref 40–120)
ALT FLD-CCNC: 18 U/L — SIGNIFICANT CHANGE UP (ref 12–78)
ANION GAP SERPL CALC-SCNC: 7 MMOL/L — SIGNIFICANT CHANGE UP (ref 5–17)
AST SERPL-CCNC: 10 U/L — LOW (ref 15–37)
BASOPHILS # BLD AUTO: 0 K/UL — SIGNIFICANT CHANGE UP (ref 0–0.2)
BASOPHILS NFR BLD AUTO: 0 % — SIGNIFICANT CHANGE UP (ref 0–2)
BILIRUB DIRECT SERPL-MCNC: 0.2 MG/DL — SIGNIFICANT CHANGE UP (ref 0–0.3)
BILIRUB INDIRECT FLD-MCNC: 0.4 MG/DL — SIGNIFICANT CHANGE UP (ref 0.2–1)
BILIRUB SERPL-MCNC: 0.6 MG/DL — SIGNIFICANT CHANGE UP (ref 0.2–1.2)
BUN SERPL-MCNC: 21 MG/DL — SIGNIFICANT CHANGE UP (ref 7–23)
CALCIUM SERPL-MCNC: 8.8 MG/DL — SIGNIFICANT CHANGE UP (ref 8.5–10.1)
CHLORIDE SERPL-SCNC: 102 MMOL/L — SIGNIFICANT CHANGE UP (ref 96–108)
CHOLEST SERPL-MCNC: 168 MG/DL — SIGNIFICANT CHANGE UP
CO2 SERPL-SCNC: 27 MMOL/L — SIGNIFICANT CHANGE UP (ref 22–31)
CREAT SERPL-MCNC: 1.16 MG/DL — SIGNIFICANT CHANGE UP (ref 0.5–1.3)
CREAT SERPL-MCNC: 1.18 MG/DL — SIGNIFICANT CHANGE UP (ref 0.5–1.3)
CRP SERPL-MCNC: 107 MG/L — HIGH
D DIMER BLD IA.RAPID-MCNC: 346 NG/ML DDU — HIGH
EGFR: 47 ML/MIN/1.73M2 — LOW
EGFR: 48 ML/MIN/1.73M2 — LOW
EOSINOPHIL # BLD AUTO: 0 K/UL — SIGNIFICANT CHANGE UP (ref 0–0.5)
EOSINOPHIL NFR BLD AUTO: 0 % — SIGNIFICANT CHANGE UP (ref 0–6)
ESTIMATED AVERAGE GLUCOSE: 151 MG/DL — HIGH (ref 68–114)
FERRITIN SERPL-MCNC: 205 NG/ML — SIGNIFICANT CHANGE UP (ref 13–330)
GLUCOSE BLDC GLUCOMTR-MCNC: 246 MG/DL — HIGH (ref 70–99)
GLUCOSE BLDC GLUCOMTR-MCNC: 271 MG/DL — HIGH (ref 70–99)
GLUCOSE BLDC GLUCOMTR-MCNC: 311 MG/DL — HIGH (ref 70–99)
GLUCOSE BLDC GLUCOMTR-MCNC: 392 MG/DL — HIGH (ref 70–99)
GLUCOSE SERPL-MCNC: 236 MG/DL — HIGH (ref 70–99)
HCT VFR BLD CALC: 31.3 % — LOW (ref 34.5–45)
HDLC SERPL-MCNC: 65 MG/DL — SIGNIFICANT CHANGE UP
HGB BLD-MCNC: 11 G/DL — LOW (ref 11.5–15.5)
INR BLD: 1.05 RATIO — SIGNIFICANT CHANGE UP (ref 0.85–1.18)
LDH SERPL L TO P-CCNC: 210 U/L — SIGNIFICANT CHANGE UP (ref 50–242)
LIPID PNL WITH DIRECT LDL SERPL: 95 MG/DL — SIGNIFICANT CHANGE UP
LYMPHOCYTES # BLD AUTO: 0.32 K/UL — LOW (ref 1–3.3)
LYMPHOCYTES # BLD AUTO: 6 % — LOW (ref 13–44)
MANUAL SMEAR VERIFICATION: SIGNIFICANT CHANGE UP
MCHC RBC-ENTMCNC: 30.1 PG — SIGNIFICANT CHANGE UP (ref 27–34)
MCHC RBC-ENTMCNC: 35.1 G/DL — SIGNIFICANT CHANGE UP (ref 32–36)
MCV RBC AUTO: 85.8 FL — SIGNIFICANT CHANGE UP (ref 80–100)
MONOCYTES # BLD AUTO: 0.11 K/UL — SIGNIFICANT CHANGE UP (ref 0–0.9)
MONOCYTES NFR BLD AUTO: 2 % — SIGNIFICANT CHANGE UP (ref 2–14)
NEUTROPHILS # BLD AUTO: 4.68 K/UL — SIGNIFICANT CHANGE UP (ref 1.8–7.4)
NEUTROPHILS NFR BLD AUTO: 89 % — HIGH (ref 43–77)
NON HDL CHOLESTEROL: 103 MG/DL — SIGNIFICANT CHANGE UP
NRBC # BLD: 0 /100 WBCS — SIGNIFICANT CHANGE UP (ref 0–0)
NRBC # BLD: SIGNIFICANT CHANGE UP /100 WBCS (ref 0–0)
PLAT MORPH BLD: NORMAL — SIGNIFICANT CHANGE UP
PLATELET # BLD AUTO: 144 K/UL — LOW (ref 150–400)
POTASSIUM SERPL-MCNC: 4.5 MMOL/L — SIGNIFICANT CHANGE UP (ref 3.5–5.3)
POTASSIUM SERPL-SCNC: 4.5 MMOL/L — SIGNIFICANT CHANGE UP (ref 3.5–5.3)
PROCALCITONIN SERPL-MCNC: 3.75 NG/ML — HIGH (ref 0.02–0.1)
PROT SERPL-MCNC: 6.7 GM/DL — SIGNIFICANT CHANGE UP (ref 6–8.3)
PROTHROM AB SERPL-ACNC: 12.5 SEC — SIGNIFICANT CHANGE UP (ref 9.5–13)
RBC # BLD: 3.65 M/UL — LOW (ref 3.8–5.2)
RBC # FLD: 12.5 % — SIGNIFICANT CHANGE UP (ref 10.3–14.5)
RBC BLD AUTO: NORMAL — SIGNIFICANT CHANGE UP
SODIUM SERPL-SCNC: 136 MMOL/L — SIGNIFICANT CHANGE UP (ref 135–145)
TRIGL SERPL-MCNC: 35 MG/DL — SIGNIFICANT CHANGE UP
VARIANT LYMPHS # BLD: 3 % — SIGNIFICANT CHANGE UP (ref 0–6)
WBC # BLD: 5.26 K/UL — SIGNIFICANT CHANGE UP (ref 3.8–10.5)
WBC # FLD AUTO: 5.26 K/UL — SIGNIFICANT CHANGE UP (ref 3.8–10.5)

## 2024-07-30 PROCEDURE — 99291 CRITICAL CARE FIRST HOUR: CPT

## 2024-07-30 PROCEDURE — 71045 X-RAY EXAM CHEST 1 VIEW: CPT | Mod: 26

## 2024-07-30 PROCEDURE — 99232 SBSQ HOSP IP/OBS MODERATE 35: CPT

## 2024-07-30 RX ORDER — INSULIN GLARGINE-YFGN 100 [IU]/ML
10 INJECTION, SOLUTION SUBCUTANEOUS AT BEDTIME
Refills: 0 | Status: DISCONTINUED | OUTPATIENT
Start: 2024-07-30 | End: 2024-07-31

## 2024-07-30 RX ORDER — DEXAMETHASONE 1.5 MG/1
6 TABLET ORAL DAILY
Refills: 0 | Status: DISCONTINUED | OUTPATIENT
Start: 2024-07-30 | End: 2024-07-30

## 2024-07-30 RX ORDER — DEXAMETHASONE 1.5 MG/1
6 TABLET ORAL DAILY
Refills: 0 | Status: DISCONTINUED | OUTPATIENT
Start: 2024-07-31 | End: 2024-08-01

## 2024-07-30 RX ORDER — ALBUTEROL 90 MCG
2 AEROSOL REFILL (GRAM) INHALATION EVERY 6 HOURS
Refills: 0 | Status: DISCONTINUED | OUTPATIENT
Start: 2024-07-30 | End: 2024-08-02

## 2024-07-30 RX ORDER — FUROSEMIDE 10 MG/ML
40 INJECTION, SOLUTION INTRAVENOUS ONCE
Refills: 0 | Status: COMPLETED | OUTPATIENT
Start: 2024-07-30 | End: 2024-07-30

## 2024-07-30 RX ADMIN — Medication 4: at 12:10

## 2024-07-30 RX ADMIN — FUROSEMIDE 40 MILLIGRAM(S): 10 INJECTION, SOLUTION INTRAVENOUS at 00:00

## 2024-07-30 RX ADMIN — Medication 2 PUFF(S): at 18:47

## 2024-07-30 RX ADMIN — Medication 2 PUFF(S): at 09:47

## 2024-07-30 RX ADMIN — DEXAMETHASONE 6 MILLIGRAM(S): 1.5 TABLET ORAL at 05:32

## 2024-07-30 RX ADMIN — Medication 255 MILLIGRAM(S): at 22:22

## 2024-07-30 RX ADMIN — LOSARTAN POTASSIUM 100 MILLIGRAM(S): 50 TABLET, FILM COATED ORAL at 09:30

## 2024-07-30 RX ADMIN — REMDESIVIR 200 MILLIGRAM(S): 100 INJECTION, POWDER, LYOPHILIZED, FOR SOLUTION INTRAVENOUS at 21:47

## 2024-07-30 RX ADMIN — Medication 100 MILLIGRAM(S): at 22:22

## 2024-07-30 RX ADMIN — INSULIN GLARGINE-YFGN 39 UNIT(S): 100 INJECTION, SOLUTION SUBCUTANEOUS at 09:26

## 2024-07-30 RX ADMIN — FUROSEMIDE 40 MILLIGRAM(S): 10 INJECTION, SOLUTION INTRAVENOUS at 09:31

## 2024-07-30 RX ADMIN — Medication 1 DROP(S): at 21:47

## 2024-07-30 RX ADMIN — REMDESIVIR 200 MILLIGRAM(S): 100 INJECTION, POWDER, LYOPHILIZED, FOR SOLUTION INTRAVENOUS at 01:21

## 2024-07-30 RX ADMIN — INSULIN GLARGINE-YFGN 10 UNIT(S): 100 INJECTION, SOLUTION SUBCUTANEOUS at 22:21

## 2024-07-30 RX ADMIN — ENOXAPARIN SODIUM 40 MILLIGRAM(S): 120 INJECTION SUBCUTANEOUS at 05:35

## 2024-07-30 RX ADMIN — DORZOLAMIDE HYDROCHLORIDE TIMOLOL MALEATE 1 DROP(S): 20; 5 SOLUTION/ DROPS OPHTHALMIC at 18:18

## 2024-07-30 RX ADMIN — Medication 2: at 09:26

## 2024-07-30 RX ADMIN — ENOXAPARIN SODIUM 40 MILLIGRAM(S): 120 INJECTION SUBCUTANEOUS at 18:19

## 2024-07-30 RX ADMIN — Medication 50 MICROGRAM(S): at 05:33

## 2024-07-30 RX ADMIN — Medication 3: at 16:29

## 2024-07-30 RX ADMIN — Medication 25 MILLIGRAM(S): at 18:18

## 2024-07-30 RX ADMIN — DORZOLAMIDE HYDROCHLORIDE TIMOLOL MALEATE 1 DROP(S): 20; 5 SOLUTION/ DROPS OPHTHALMIC at 05:35

## 2024-07-30 RX ADMIN — Medication 25 MILLIGRAM(S): at 05:33

## 2024-07-30 RX ADMIN — Medication 2 PUFF(S): at 12:14

## 2024-07-30 RX ADMIN — ATORVASTATIN CALCIUM 40 MILLIGRAM(S): 40 TABLET, FILM COATED ORAL at 21:46

## 2024-07-30 RX ADMIN — Medication 3: at 22:21

## 2024-07-30 NOTE — DISCHARGE NOTE PROVIDER - HOSPITAL COURSE
79 yr old female presenting with acute hypoxic respiratory failure 2/2 COVID PNA      Problem/Plan - 1:  ·  Problem: Acute respiratory failure with hypoxia.   ·  Plan: New  RR 25 satting 95% on NC (91% RA)  COVID +-> decadron and remdesivir   CXR: multifocal pna-> s/p rocephin and azitho-> continue   pct added on, MRSA, legionella  COVID order set with inflammatory markers   Wean O2 as tolerated    Rapid response called fro increased SOB and desaturations- bibasilar crackles- lasix 40mg IV x1 ordered.      Problem/Plan - 2:  ·  Problem: 2019 novel coronavirus disease (COVID-19).   ·  Plan: New  Treatment as above.    Problem/Plan - 3:  ·  Problem: Multifocal pneumonia.   ·  Plan: New  Treatment as above.    Problem/Plan - 4:  ·  Problem: Benign essential HTN.   ·  Plan: Chronic stable   /89  Continue coreg 25mg BID and telmisartan-hctz 80-12.5mg daily formulary   Hold nifedipine 60mg BID and aldactone 25mg daily  Restart as appropriate  Monitor.    Problem/Plan - 5:  ·  Problem: T2DM (type 2 diabetes mellitus).   ·  Plan: Chronic moderate exacerbation     Home regimen: basal 48 units daily, metformin 1g BID  Continue with basal 39 units daily + LDCS  A1c and lipid panel in AM.    Problem/Plan - 6:  ·  Problem: HLD (hyperlipidemia).   ·  Plan: Chronic stable  Continue atorvastatin 40mg daily   Lipid panel in AM. 79 yr old female with a pmh of HTN, HLD, T2DM on insulin, and partial thyroidectomy who presents with progressive productive cough of white phlegm, CARVAJAL. Found to be in acute hypoxic respiratory failure secondary to COVID PNA. Started on Abx, Decadron/Remdesivir, with RRT called after admission for respiratory distress and volume overload. Patient recieved diuresis, and was titrated off oxygen during hospital course. Patient remained afebrile, hemodynamically stable and with improved respiratory status.       Problem/Plan - 1:  ·  Problem: Acute respiratory failure with hypoxia.   ·  Plan: New  RR 25 satting 95% on NC (91% RA)  COVID +-> decadron and remdesivir   CXR: multifocal pna-> s/p rocephin and azitho-> continue   pct added on, MRSA, legionella  COVID order set with inflammatory markers   Wean O2 as tolerated    Rapid response called on 7/30 for increased SOB and desaturations- bibasilar crackles- lasix 40mg IV x1 ordered.    Patient with symptomatic improvement following Diuresis/Steroids   No longer requiring Oxygen    Problem/Plan - 2:  ·  Problem: 2019 novel coronavirus disease (COVID-19).   ·  Plan: New  Treatment as above.    Problem/Plan - 3:  ·  Problem: Multifocal pneumonia.   ·  Plan: New  Treatment as above.    Problem/Plan - 4:  ·  Problem: Benign essential HTN.   ·  Plan: Chronic stable   /89  Continue coreg 25mg BID and telmisartan-hctz 80-12.5mg daily formulary   Hold nifedipine 60mg BID and aldactone 25mg daily  Restart as appropriate  Monitor.    Problem/Plan - 5:  ·  Problem: T2DM (type 2 diabetes mellitus).   ·  Plan: Chronic moderate exacerbation     Home regimen: basal 48 units daily, metformin 1g BID  Continue with basal 39 units daily + LDCS  A1c and lipid panel in AM.    Problem/Plan - 6:  ·  Problem: HLD (hyperlipidemia).   ·  Plan: Chronic stable  Continue atorvastatin 40mg daily   Lipid panel in AM. 79 yr old female with a pmh of HTN, HLD, T2DM on insulin, and partial thyroidectomy who presents with progressive productive cough of white phlegm, CARVAJAL. Found to be in acute hypoxic respiratory failure secondary to COVID PNA. Started on Abx, Decadron/Remdesivir, with RRT called after admission for respiratory distress and volume overload. Patient recieved diuresis, and was titrated off oxygen during hospital course. Patient remained afebrile, hemodynamically stable and with improved respiratory status.       Problem/Plan - 1:  ·  Problem: Acute respiratory failure with hypoxia.    Due to Superimposed Bacterial PNA secondary to COVID  Started on Rocephin, stopped azithro, completed course 5 days     clinically not volume overloaded  S/P remdesvir and decadron   MRSA neg  SO2 in RA after ambulation WNL    Staph epidermidis bacteremia, likely contaminated, repeat neg.  Complains of diarrhea, likely 2/2 abx, added probiotic. diarrhea resolved       Problem/Plan - 2:  ·  Problem: 2019 novel coronavirus disease (COVID-19).   ·  Plan: New  Treatment as above.    Problem/Plan - 3:  ·  Problem: Multifocal pneumonia.   ·  Plan: New  Treatment as above.    Problem/Plan - 4:  ·  Problem: Benign essential HTN.   ·  Plan: Chronic stable   Continue coreg 25mg BID and telmisartan-hctz 80-12.5mg daily formulary   Restart nifedipine 60mg BID and aldactone 25mg daily    Problem/Plan - 5:  ·  Problem: T2DM (type 2 diabetes mellitus).   ·  Plan: Chronic moderate exacerbation   Hb A 1c 6.9  With hyperglycemia 2/2 steroid   Home regimen: basal 48 units daily, metformin 1g BID, resume on dc, expect glucose improvement once off steroid     Problem/Plan - 6:  ·  Problem: HLD (hyperlipidemia).   ·  Plan: Chronic stable  Continue atorvastatin 40mg daily

## 2024-07-30 NOTE — RAPID RESPONSE TEAM SUMMARY - NSSITUATIONBACKGROUNDRRT_GEN_ALL_CORE
79 yr old female with a pmh of HTN, HLD, T2DM on insulin, and partial thyroidectomy who presents with progressive productive cough of white phlegm, CARVAJAL since Saturday. Presented to Urgent Care yesterday and dx with COVID-> prescribe paxlovid with no relief in symptoms.   RRT for acute respiratory distress. As patient was moving from stretcher to bed, she complains of SOB. Patient was placed on NRB mask. VS as follow with /78, , RR 24, SpO2 93% on NRB mask, Temp 97.4 and . Upon arrival, patient awake, alert and oriented with labored breathing/ tachypneic. On assessment, Lungs-crackles heard throughout. Extremities- Pedal edema noted.

## 2024-07-30 NOTE — DISCHARGE NOTE PROVIDER - NSDCFUADDAPPT_GEN_ALL_CORE_FT
It is important to see your primary physician as well as the physicians noted below within the next week to perform a comprehensive medical review.  Call their offices for an appointment as soon as you leave the hospital.  You will also need to see them for renewal of your medications.  If you do not have a primary physician, contact the [facility] Physician Referral Service at [phone].  To obtain your results, you can access the [email or web address] Patient Portal at [email or web address]  Your medical issues appear to be stable at this time, but if your symptoms recur or worsen, contact your physicians and/or return to the hospital if necessary.  If you encounter any issues or questions with your medication, call your physicians before stopping the medication.

## 2024-07-30 NOTE — PROGRESS NOTE ADULT - PROBLEM SELECTOR PLAN 1
Due to Superimposed Bacterial pna secondary to COVID  started on rocephin and azithro  given IV fluids  Rapid response called fro increased SOB and desaturations- bibasilar crackles- lasix 40mg IV x1 ordered  now: on room air  plan  remdesvir  abx  IV decadron  mobilize patient today and ensure no 02 needs

## 2024-07-30 NOTE — DISCHARGE NOTE PROVIDER - ATTENDING DISCHARGE PHYSICAL EXAMINATION:
Vital Signs Last 24 Hrs  T(C): 36.9 (02 Aug 2024 10:55), Max: 37.1 (01 Aug 2024 17:10)  T(F): 98.5 (02 Aug 2024 10:55), Max: 98.7 (01 Aug 2024 17:10)  HR: 56 (02 Aug 2024 10:55) (56 - 78)  BP: 118/65 (02 Aug 2024 10:55) (118/65 - 191/73)  BP(mean): --  RR: 17 (02 Aug 2024 10:55) (17 - 19)  SpO2: 96% (02 Aug 2024 10:55) (96% - 98%)    Parameters below as of 02 Aug 2024 10:55  Patient On (Oxygen Delivery Method): room air  GENERAL: NAD  HEAD:  Atraumatic   EYES: PERRLA  ENMT: Mouth moist  NECK: Supple  NERVOUS SYSTEM:  Awake, alert  CHEST/LUNG: Clear  HEART: RRR, S1, S2  ABDOMEN: Soft, non tender  EXTREMITIES:  no edema BL LE  SKIN: No rash

## 2024-07-30 NOTE — RAPID RESPONSE TEAM SUMMARY - NSOTHERINTERVENTIONSRRT_GEN_ALL_CORE
CPAP as per patient's request (EPAP 10 and FiO2 40%) Titrate as tolerated.  On continuous pulse ox.    Critical Care time: 35 min assessing presenting problems of acute illness that poses high probability of life threatening deterioration or end organ damage/dysfunction.  Medical decision making including Initiating plan of care, reviewing data, reviewing radiology, direct patient bedside evaluation and interpretation of vital signs, discussing goals of care with patient/family, all non inclusive of procedures.

## 2024-07-30 NOTE — PATIENT PROFILE ADULT - FUNCTIONAL SCREEN CURRENT LEVEL: COMMUNICATION, MLM
H&P reviewed  After examining the patient I find no changes in the patients condition since the H&P had been written      Vitals:    03/16/22 1035   BP: 137/70   Pulse: 86   Resp: 18   Temp: 97 5 °F (36 4 °C)   SpO2: 97%
0 = understands/communicates without difficulty

## 2024-07-30 NOTE — DISCHARGE NOTE PROVIDER - NSDCMRMEDTOKEN_GEN_ALL_CORE_FT
atorvastatin 40 mg oral tablet: 1 tab(s) orally once a day  CARVEDILOL 25 MG TABLET: TAKE 1 TABLET BY MOUTH TWICE A DAY  dorzolamide-timolol 2.23%-0.68% (2%-0.5% base) ophthalmic solution: 1 drop(s) in each eye 2 times a day  latanoprost 0.005% ophthalmic solution: to each affected eye once a day (at bedtime)  Levemir FlexPen 100 units/mL subcutaneous solution: 48 unit(s) subcutaneous once a day  levothyroxine 50 mcg (0.05 mg) oral tablet: 1 tab(s) orally once a day  metFORMIN 1000 mg oral tablet: 1 tab(s) orally 2 times a day  NIFEdipine: 60 milligram(s) orally 2 times a day  spironolactone 25 mg oral tablet: 1 tab(s) orally once a day  telmisartan-hydrochlorothiazide 80 mg-12.5 mg oral tablet: 1 tab(s) orally once a day   atorvastatin 40 mg oral tablet: 1 tab(s) orally once a day  dorzolamide-timolol 2.23%-0.68% (2%-0.5% base) ophthalmic solution: 1 drop(s) in each eye 2 times a day  latanoprost 0.005% ophthalmic solution: to each affected eye once a day (at bedtime)  Levemir FlexPen 100 units/mL subcutaneous solution: 48 unit(s) subcutaneous once a day  levothyroxine 50 mcg (0.05 mg) oral tablet: 1 tab(s) orally once a day  Medrol Dosepak 4 mg oral tablet: 1 tab(s) orally 6 times a day Please take medication as directed on package    6 tabs day 1   5 tabs day 2  4 tabs day 3  3 tabs day 4  2 tabs day 5  1 tab day 6  metFORMIN 1000 mg oral tablet: 1 tab(s) orally 2 times a day  NIFEdipine: 60 milligram(s) orally 2 times a day  telmisartan-hydrochlorothiazide 80 mg-12.5 mg oral tablet: 1 tab(s) orally once a day   Aldactone 25 mg oral tablet: orally once a day  atorvastatin 40 mg oral tablet: 1 tab(s) orally once a day  Coreg 25 mg oral tablet: orally 2 times a day  dorzolamide-timolol 2.23%-0.68% (2%-0.5% base) ophthalmic solution: 1 drop(s) in each eye 2 times a day  lactobacillus acidophilus oral capsule: 1 tab(s) orally 3 times a day  latanoprost 0.005% ophthalmic solution: to each affected eye once a day (at bedtime)  Levemir FlexPen 100 units/mL subcutaneous solution: 48 unit(s) subcutaneous once a day  levothyroxine 50 mcg (0.05 mg) oral tablet: 1 tab(s) orally once a day  metFORMIN 1000 mg oral tablet: 1 tab(s) orally 2 times a day  NIFEdipine: 60 milligram(s) orally 2 times a day  telmisartan-hydrochlorothiazide 80 mg-12.5 mg oral tablet: 1 tab(s) orally once a day  Xarelto 10 mg oral tablet: 1 tab(s) orally once a day

## 2024-07-30 NOTE — PATIENT PROFILE ADULT - FALL HARM RISK - HARM RISK INTERVENTIONS

## 2024-07-30 NOTE — PHYSICAL THERAPY INITIAL EVALUATION ADULT - ADDITIONAL COMMENTS
As per pt : There are 4 steps, c close shu rails, at the entry of the house and 15 steps, c L rail up,  to negotiate at home. Pt ambulates w/o assist device most of the time and ambulated c a straight cane occasionally in the community.

## 2024-07-30 NOTE — PROGRESS NOTE ADULT - SUBJECTIVE AND OBJECTIVE BOX
Patient seen and examined  RRT noted  today: patient on room resting comfortably  although mild crackles on exam  CXR today clear (mediastinal mass from 2022 noted)    Review of Systems:  General:denies fever chills, headache, weakness  HEENT: denies blurry vision,diffculty swallowing, difficulty hearing, tinnitus  Cardiovascular: denies chest pain  ,palpitations  Pulmonary:denies shortness of breath, cough, wheezing, hemoptysis  Gastrointestinal: denies abdominal pain, constipation, diarrhea,nausea , vomiting, hematochezia  : denies hematuria, dysuria, or incontinence  Neurological: denies weakness, numbness , tingling, dizziness, tremors  MSK: denies muscle pain, difficulty ambulating, swelling, back pain  skin: denies skin rash, itching, burning, or  skin lesions  Psychiatrical: denies mood disturbances, anxierty, feeling depressed, depression , or difficulty sleeping    Objective:  Vitals  T(C): 36.9 (07-30-24 @ 10:36), Max: 37.8 (07-29-24 @ 22:59)  HR: 79 (07-30-24 @ 10:30) (62 - 111)  BP: 120/61 (07-30-24 @ 10:30) (108/63 - 168/88)  RR: 18 (07-30-24 @ 10:30) (18 - 25)  SpO2: 94% (07-30-24 @ 10:30) (91% - 100%)    Physical Exam:  General: comfortable, no acute distress  HEENT: Atraumatic, no LAD, trachea midline, PERRLA  Cardiovascular: normal s1s2, no murmurs, gallops or fricition rubs  Pulmonary: crackles no wheezing , rhonchi  Gastrointestinal: soft non tender non distended, no masses felt, no organomegally  Muscloskeletal: no lower extremity edema, intact bilateral lower extremity pulses  Neurological: CN II-12 intact. No focal weakness  Psychiatrical: normal mood, cooperative  SKIN: no rash, lesions or ulcers    Labs:                          11.0   5.26  )-----------( 144      ( 30 Jul 2024 06:40 )             31.3     07-30    136  |  102  |  21  ----------------------------<  236<H>  4.5   |  27  |  1.18    Ca    8.8      30 Jul 2024 06:40    TPro  6.7  /  Alb  2.9<L>  /  TBili  0.6  /  DBili  0.2  /  AST  10<L>  /  ALT  18  /  AlkPhos  78  07-30    LIVER FUNCTIONS - ( 30 Jul 2024 06:40 )  Alb: 2.9 g/dL / Pro: 6.7 gm/dL / ALK PHOS: 78 U/L / ALT: 18 U/L / AST: 10 U/L / GGT: x           PT/INR - ( 30 Jul 2024 06:40 )   PT: 12.5 sec;   INR: 1.05 ratio               Active Medications  MEDICATIONS  (STANDING):  acetaminophen     Tablet .. 975 milliGRAM(s) Oral once  albuterol    90 MICROgram(s) HFA Inhaler 2 Puff(s) Inhalation every 6 hours  atorvastatin 40 milliGRAM(s) Oral at bedtime  azithromycin  IVPB 500 milliGRAM(s) IV Intermittent every 24 hours  carvedilol 25 milliGRAM(s) Oral every 12 hours  cefTRIAXone   IVPB 1000 milliGRAM(s) IV Intermittent every 24 hours  dextrose 5%. 1000 milliLiter(s) (100 mL/Hr) IV Continuous <Continuous>  dextrose 5%. 1000 milliLiter(s) (50 mL/Hr) IV Continuous <Continuous>  dextrose 50% Injectable 25 Gram(s) IV Push once  dextrose 50% Injectable 25 Gram(s) IV Push once  dextrose 50% Injectable 12.5 Gram(s) IV Push once  dorzolamide 2%/timolol 0.5% Ophthalmic Solution 1 Drop(s) Both EYES two times a day  enoxaparin Injectable 40 milliGRAM(s) SubCutaneous every 12 hours  glucagon  Injectable 1 milliGRAM(s) IntraMuscular once  hydrochlorothiazide 12.5 milliGRAM(s) Oral with breakfast  insulin glargine Injectable (LANTUS) 10 Unit(s) SubCutaneous at bedtime  insulin glargine Injectable (LANTUS) 39 Unit(s) SubCutaneous every morning  insulin lispro (ADMELOG) corrective regimen sliding scale   SubCutaneous at bedtime  insulin lispro (ADMELOG) corrective regimen sliding scale   SubCutaneous three times a day before meals  latanoprost 0.005% Ophthalmic Solution 1 Drop(s) Both EYES at bedtime  levothyroxine 50 MICROGram(s) Oral daily  losartan 100 milliGRAM(s) Oral with breakfast  remdesivir  IVPB 100 milliGRAM(s) IV Intermittent every 24 hours  remdesivir  IVPB   IV Intermittent     MEDICATIONS  (PRN):  acetaminophen     Tablet .. 650 milliGRAM(s) Oral every 6 hours PRN Temp greater or equal to 38C (100.4F), Mild Pain (1 - 3)  aluminum hydroxide/magnesium hydroxide/simethicone Suspension 30 milliLiter(s) Oral every 4 hours PRN Dyspepsia  dextrose Oral Gel 15 Gram(s) Oral once PRN Blood Glucose LESS THAN 70 milliGRAM(s)/deciliter  melatonin 3 milliGRAM(s) Oral at bedtime PRN Insomnia  ondansetron Injectable 4 milliGRAM(s) IV Push every 8 hours PRN Nausea and/or Vomiting

## 2024-07-30 NOTE — PHYSICAL THERAPY INITIAL EVALUATION ADULT - GAIT TRAINING, PT EVAL
Pt will ambulate 600 feet and negotiate 15 steps c one rail, with appropriate assist device, independently, without loss of balance, by 2 weeks.

## 2024-07-30 NOTE — DISCHARGE NOTE PROVIDER - NSDCCPCAREPLAN_GEN_ALL_CORE_FT
PRINCIPAL DISCHARGE DIAGNOSIS  Diagnosis: 2019 novel coronavirus disease (COVID-19)  Assessment and Plan of Treatment:       SECONDARY DISCHARGE DIAGNOSES  Diagnosis: Atypical pneumonia  Assessment and Plan of Treatment:      PRINCIPAL DISCHARGE DIAGNOSIS  Diagnosis: 2019 novel coronavirus disease (COVID-19)  Assessment and Plan of Treatment: 79 yr old female with a pmh of HTN, HLD, T2DM on insulin, and partial thyroidectomy who presents with progressive productive cough of white phlegm, CARVAJAL. Found to be in acute hypoxic respiratory failure secondary to COVID PNA. Started on Abx, Decadron/Remdesivir, with RRT called after admission for respiratory distress and volume overload. Patient recieved diuresis, and was titrated off oxygen during hospital course. Patient remained afebrile, hemodynamically stable and with improved respiratory status.   Problem/Plan - 1:  ·  Problem: Acute respiratory failure with hypoxia.    Due to Superimposed Bacterial PNA secondary to COVID  Started on Rocephin, stopped azithro, completed course 5 days   clinically not volume overloaded  S/P remdesvir and decadron   MRSA neg  SO2 in RA after ambulation WNL  Staph epidermidis bacteremia, likely contaminated, repeat neg.  Complains of diarrhea, likely 2/2 abx, added probiotic. diarrhea resolved   Problem/Plan - 2:  ·  Problem: 2019 novel coronavirus disease (COVID-19).   ·  Plan: New  Treatment as above.  Problem/Plan - 3:  ·  Problem: Multifocal pneumonia.   ·  Plan: New  Treatment as above.  Problem/Plan - 4:  ·  Problem: Benign essential HTN.   ·  Plan: Chronic stable   Continue coreg 25mg BID and telmisartan-hctz 80-12.5mg daily formulary   Restart nifedipine 60mg BID and aldactone 25mg daily  Problem/Plan - 5:  ·  Problem: T2DM (type 2 diabetes mellitus).   ·  Plan: Chronic moderate exacerbation   Hb A 1c 6.9  With hyperglycemia 2/2 steroid   Home regimen: basal 48 units daily, metformin 1g BID, resume on dc, expect glucose improvement once off steroid   Problem/Plan - 6:  ·  Problem: HLD (hyperlipidemia).   ·  Plan: Chronic stable  Continue atorvastatin 40mg daily         SECONDARY DISCHARGE DIAGNOSES  Diagnosis: Atypical pneumonia  Assessment and Plan of Treatment:

## 2024-07-30 NOTE — PHYSICAL THERAPY INITIAL EVALUATION ADULT - TRANSFER TRAINING, PT EVAL
To be able to perform transfers Independently, including bed to chair, chair to bed and chair to chair In order to facilitate safety with appropriate technique in 2 weeks.

## 2024-07-30 NOTE — PHYSICAL THERAPY INITIAL EVALUATION ADULT - GAIT DEVIATIONS NOTED, PT EVAL
decreased anthony/increased time in double stance/decreased velocity of limb motion/decreased step length/decreased stride length

## 2024-07-30 NOTE — PHYSICAL THERAPY INITIAL EVALUATION ADULT - PRECAUTIONS/LIMITATIONS, REHAB EVAL
COVID precaution maintained throughout P.T. intervention./cardiac precautions/fall precautions/isolation precautions/obesity precautions

## 2024-07-30 NOTE — PHYSICAL THERAPY INITIAL EVALUATION ADULT - GENERAL OBSERVATIONS, REHAB EVAL
Pt was seen in semi-supine c cardiac monitor +, Primafit+, and heplock+, alert and Ox4. Pt was comfortable and motivated.

## 2024-07-31 LAB
-  STAPHYLOCOCCUS EPIDERMIDIS: SIGNIFICANT CHANGE UP
ALBUMIN SERPL ELPH-MCNC: 2.7 G/DL — LOW (ref 3.3–5)
ALP SERPL-CCNC: 69 U/L — SIGNIFICANT CHANGE UP (ref 40–120)
ALT FLD-CCNC: 18 U/L — SIGNIFICANT CHANGE UP (ref 12–78)
APPEARANCE UR: CLEAR — SIGNIFICANT CHANGE UP
AST SERPL-CCNC: 8 U/L — LOW (ref 15–37)
BILIRUB DIRECT SERPL-MCNC: 0.2 MG/DL — SIGNIFICANT CHANGE UP (ref 0–0.3)
BILIRUB INDIRECT FLD-MCNC: 0.3 MG/DL — SIGNIFICANT CHANGE UP (ref 0.2–1)
BILIRUB SERPL-MCNC: 0.5 MG/DL — SIGNIFICANT CHANGE UP (ref 0.2–1.2)
BILIRUB UR-MCNC: NEGATIVE — SIGNIFICANT CHANGE UP
COLOR SPEC: YELLOW — SIGNIFICANT CHANGE UP
CREAT SERPL-MCNC: 1.09 MG/DL — SIGNIFICANT CHANGE UP (ref 0.5–1.3)
DIFF PNL FLD: NEGATIVE — SIGNIFICANT CHANGE UP
EGFR: 52 ML/MIN/1.73M2 — LOW
GLUCOSE BLDC GLUCOMTR-MCNC: 309 MG/DL — HIGH (ref 70–99)
GLUCOSE BLDC GLUCOMTR-MCNC: 353 MG/DL — HIGH (ref 70–99)
GLUCOSE BLDC GLUCOMTR-MCNC: 373 MG/DL — HIGH (ref 70–99)
GLUCOSE BLDC GLUCOMTR-MCNC: 414 MG/DL — HIGH (ref 70–99)
GLUCOSE BLDC GLUCOMTR-MCNC: 470 MG/DL — CRITICAL HIGH (ref 70–99)
GLUCOSE BLDC GLUCOMTR-MCNC: 495 MG/DL — CRITICAL HIGH (ref 70–99)
GLUCOSE UR QL: >=1000 MG/DL
GRAM STN FLD: ABNORMAL
GRAM STN FLD: ABNORMAL
HCT VFR BLD CALC: 30.2 % — LOW (ref 34.5–45)
HGB BLD-MCNC: 10.6 G/DL — LOW (ref 11.5–15.5)
INR BLD: 1.07 RATIO — SIGNIFICANT CHANGE UP (ref 0.85–1.18)
KETONES UR-MCNC: NEGATIVE MG/DL — SIGNIFICANT CHANGE UP
LEGIONELLA AG UR QL: NEGATIVE — SIGNIFICANT CHANGE UP
LEUKOCYTE ESTERASE UR-ACNC: NEGATIVE — SIGNIFICANT CHANGE UP
MCHC RBC-ENTMCNC: 29.9 PG — SIGNIFICANT CHANGE UP (ref 27–34)
MCHC RBC-ENTMCNC: 35.1 G/DL — SIGNIFICANT CHANGE UP (ref 32–36)
MCV RBC AUTO: 85.3 FL — SIGNIFICANT CHANGE UP (ref 80–100)
METHOD TYPE: SIGNIFICANT CHANGE UP
MRSA PCR RESULT.: SIGNIFICANT CHANGE UP
NITRITE UR-MCNC: NEGATIVE — SIGNIFICANT CHANGE UP
NRBC # BLD: 0 /100 WBCS — SIGNIFICANT CHANGE UP (ref 0–0)
PH UR: 5.5 — SIGNIFICANT CHANGE UP (ref 5–8)
PLATELET # BLD AUTO: 157 K/UL — SIGNIFICANT CHANGE UP (ref 150–400)
PROT SERPL-MCNC: 6.8 GM/DL — SIGNIFICANT CHANGE UP (ref 6–8.3)
PROT UR-MCNC: 100 MG/DL
PROTHROM AB SERPL-ACNC: 12.8 SEC — SIGNIFICANT CHANGE UP (ref 9.5–13)
RBC # BLD: 3.54 M/UL — LOW (ref 3.8–5.2)
RBC # FLD: 12.4 % — SIGNIFICANT CHANGE UP (ref 10.3–14.5)
S AUREUS DNA NOSE QL NAA+PROBE: SIGNIFICANT CHANGE UP
S PNEUM AG UR QL: NEGATIVE — SIGNIFICANT CHANGE UP
SP GR SPEC: 1.02 — SIGNIFICANT CHANGE UP (ref 1–1.03)
SPECIMEN SOURCE: SIGNIFICANT CHANGE UP
SPECIMEN SOURCE: SIGNIFICANT CHANGE UP
UROBILINOGEN FLD QL: 0.2 MG/DL — SIGNIFICANT CHANGE UP (ref 0.2–1)
WBC # BLD: 7.49 K/UL — SIGNIFICANT CHANGE UP (ref 3.8–10.5)
WBC # FLD AUTO: 7.49 K/UL — SIGNIFICANT CHANGE UP (ref 3.8–10.5)

## 2024-07-31 PROCEDURE — 99232 SBSQ HOSP IP/OBS MODERATE 35: CPT

## 2024-07-31 RX ORDER — INSULIN LISPRO 100/ML
15 VIAL (ML) SUBCUTANEOUS
Refills: 0 | Status: DISCONTINUED | OUTPATIENT
Start: 2024-07-31 | End: 2024-08-02

## 2024-07-31 RX ADMIN — Medication 6: at 16:09

## 2024-07-31 RX ADMIN — DORZOLAMIDE HYDROCHLORIDE TIMOLOL MALEATE 1 DROP(S): 20; 5 SOLUTION/ DROPS OPHTHALMIC at 18:11

## 2024-07-31 RX ADMIN — Medication 4: at 08:20

## 2024-07-31 RX ADMIN — REMDESIVIR 200 MILLIGRAM(S): 100 INJECTION, POWDER, LYOPHILIZED, FOR SOLUTION INTRAVENOUS at 22:08

## 2024-07-31 RX ADMIN — ATORVASTATIN CALCIUM 40 MILLIGRAM(S): 40 TABLET, FILM COATED ORAL at 22:07

## 2024-07-31 RX ADMIN — Medication 1 DROP(S): at 22:22

## 2024-07-31 RX ADMIN — LOSARTAN POTASSIUM 100 MILLIGRAM(S): 50 TABLET, FILM COATED ORAL at 08:14

## 2024-07-31 RX ADMIN — Medication 25 MILLIGRAM(S): at 06:16

## 2024-07-31 RX ADMIN — ENOXAPARIN SODIUM 40 MILLIGRAM(S): 120 INJECTION SUBCUTANEOUS at 18:10

## 2024-07-31 RX ADMIN — Medication 4: at 22:09

## 2024-07-31 RX ADMIN — ENOXAPARIN SODIUM 40 MILLIGRAM(S): 120 INJECTION SUBCUTANEOUS at 07:14

## 2024-07-31 RX ADMIN — Medication 25 MILLIGRAM(S): at 18:10

## 2024-07-31 RX ADMIN — Medication 5: at 12:32

## 2024-07-31 RX ADMIN — Medication 50 MICROGRAM(S): at 06:15

## 2024-07-31 RX ADMIN — DORZOLAMIDE HYDROCHLORIDE TIMOLOL MALEATE 1 DROP(S): 20; 5 SOLUTION/ DROPS OPHTHALMIC at 06:16

## 2024-07-31 RX ADMIN — Medication 2 PUFF(S): at 12:34

## 2024-07-31 RX ADMIN — Medication 2 PUFF(S): at 18:12

## 2024-07-31 RX ADMIN — Medication 2 PUFF(S): at 00:22

## 2024-07-31 RX ADMIN — Medication 2 PUFF(S): at 06:13

## 2024-07-31 RX ADMIN — INSULIN GLARGINE-YFGN 39 UNIT(S): 100 INJECTION, SOLUTION SUBCUTANEOUS at 08:19

## 2024-07-31 RX ADMIN — DEXAMETHASONE 6 MILLIGRAM(S): 1.5 TABLET ORAL at 07:15

## 2024-07-31 RX ADMIN — Medication 100 MILLIGRAM(S): at 22:08

## 2024-07-31 NOTE — PROGRESS NOTE ADULT - PROBLEM SELECTOR PLAN 1
Due to Superimposed Bacterial PNA secondary to COVID  Started on Rocephin, stopped azithro  Rapid response called yesterday for increased SOB and desaturations- bibasilar crackles- Lasix 40mg IV x1 ordered  remdesvir  abx  IV decadron  Check MRSA  Check SO2 in RA after ambulation Due to Superimposed Bacterial PNA secondary to COVID  Started on Rocephin, stopped azithro  Rapid response called yesterday for increased SOB and desaturations- bibasilar crackles- Lasix 40mg IV x1 ordered  remdesvir  abx  IV decadron  Check MRSA  Check SO2 in RA after ambulation    Staph epidermidis bacteremia, likely contaminated, repeat ordered

## 2024-07-31 NOTE — PHARMACOTHERAPY INTERVENTION NOTE - OUTCOME
Caller: Tracy Luevano    Relationship: Self    Best call back number:     [phone] (Mobile)       Requested Prescriptions:   nystatin (MYCOSTATIN) [id number] UNIT/GM cream  APPLY TO THE AFFECTED AREA(S) BY TOPICAL ROUTE 2 TIMES PER DAY        Pharmacy where request should be sent: [id number] - [place], [place] - [address] [facility] AT [facility] OF [address] & [place] - [phone] [facility] [phone] FX     Last office visit with prescribing clinician: 11/2/2023   Last telemedicine visit with prescribing clinician: Visit date not found   Next office visit with prescribing clinician: 2/2/2024     Additional details provided by patient: PATIENT IS OUT OF MEDICATION. SAYS HISTORICAL PROVIDER. PATIENT SAID WAS LAST FILLED AT URGENT CARE    Does the patient have less than a 3 day supply:  [x] Yes  [] No    Would you like a call back once the refill request has been completed: [x] Yes [] No    If the office needs to give you a call back, can they leave a voicemail: [x] Yes [] No    Aryan Jerome Rep   11/08/23 15:07 EST           
Medication sent to pharmacy for patient.  
not accepted
accepted
accepted

## 2024-07-31 NOTE — PROGRESS NOTE ADULT - SUBJECTIVE AND OBJECTIVE BOX
Patient is a 79y old  Female who presents with a chief complaint of acute hypoxic respiratory failure 2/2 COVID PNA (2024 14:38)      INTERVAL HPI/OVERNIGHT EVENTS:  Pt was seen and examined, no acute events.      MEDICATIONS  (STANDING):  acetaminophen     Tablet .. 975 milliGRAM(s) Oral once  albuterol    90 MICROgram(s) HFA Inhaler 2 Puff(s) Inhalation every 6 hours  atorvastatin 40 milliGRAM(s) Oral at bedtime  carvedilol 25 milliGRAM(s) Oral every 12 hours  cefTRIAXone   IVPB 1000 milliGRAM(s) IV Intermittent every 24 hours  dexAMETHasone  Injectable 6 milliGRAM(s) IV Push daily  dextrose 5%. 1000 milliLiter(s) (50 mL/Hr) IV Continuous <Continuous>  dextrose 5%. 1000 milliLiter(s) (100 mL/Hr) IV Continuous <Continuous>  dextrose 50% Injectable 25 Gram(s) IV Push once  dextrose 50% Injectable 25 Gram(s) IV Push once  dextrose 50% Injectable 12.5 Gram(s) IV Push once  dorzolamide 2%/timolol 0.5% Ophthalmic Solution 1 Drop(s) Both EYES two times a day  enoxaparin Injectable 40 milliGRAM(s) SubCutaneous every 12 hours  glucagon  Injectable 1 milliGRAM(s) IntraMuscular once  hydrochlorothiazide 12.5 milliGRAM(s) Oral with breakfast  insulin glargine Injectable (LANTUS) 39 Unit(s) SubCutaneous every morning  insulin lispro (ADMELOG) corrective regimen sliding scale   SubCutaneous at bedtime  insulin lispro (ADMELOG) corrective regimen sliding scale   SubCutaneous three times a day before meals  insulin lispro Injectable (ADMELOG) 15 Unit(s) SubCutaneous three times a day before meals  latanoprost 0.005% Ophthalmic Solution 1 Drop(s) Both EYES at bedtime  levothyroxine 50 MICROGram(s) Oral daily  losartan 100 milliGRAM(s) Oral with breakfast  remdesivir  IVPB   IV Intermittent   remdesivir  IVPB 100 milliGRAM(s) IV Intermittent every 24 hours    MEDICATIONS  (PRN):  acetaminophen     Tablet .. 650 milliGRAM(s) Oral every 6 hours PRN Temp greater or equal to 38C (100.4F), Mild Pain (1 - 3)  aluminum hydroxide/magnesium hydroxide/simethicone Suspension 30 milliLiter(s) Oral every 4 hours PRN Dyspepsia  dextrose Oral Gel 15 Gram(s) Oral once PRN Blood Glucose LESS THAN 70 milliGRAM(s)/deciliter  melatonin 3 milliGRAM(s) Oral at bedtime PRN Insomnia  ondansetron Injectable 4 milliGRAM(s) IV Push every 8 hours PRN Nausea and/or Vomiting      Allergies    No Known Allergies    Intolerances          Vital Signs Last 24 Hrs  T(C): 36.9 (2024 15:20), Max: 36.9 (2024 05:19)  T(F): 98.4 (2024 15:20), Max: 98.5 (2024 05:19)  HR: 74 (2024 18:07) (67 - 77)  BP: 151/63 (2024 18:07) (116/64 - 151/63)  BP(mean): --  RR: 18 (2024 18:07) (17 - 18)  SpO2: 96% (2024 18:07) (94% - 100%)    Parameters below as of 2024 18:07  Patient On (Oxygen Delivery Method): room air        PHYSICAL EXAM:  GENERAL:   HEAD:    EYES:   ENMT:   NECK:   NERVOUS SYSTEM:    CHEST/LUNG:   HEART:   ABDOMEN:   EXTREMITIES:    LYMPH:   SKIN:         LABS:                        10.6   7.49  )-----------( 157      ( 2024 10:45 )             30.2     07-31    x   |  x   |  x   ----------------------------<  x   x    |  x   |  1.09    Ca    8.8      2024 06:40    TPro  6.8  /  Alb  2.7<L>  /  TBili  0.5  /  DBili  0.2  /  AST  8<L>  /  ALT  18  /  AlkPhos  69  07-31    PT/INR - ( 2024 06:40 )   PT: 12.8 sec;   INR: 1.07 ratio           Urinalysis Basic - ( 2024 05:42 )    Color: Yellow / Appearance: Clear / S.023 / pH: x  Gluc: x / Ketone: Negative mg/dL  / Bili: Negative / Urobili: 0.2 mg/dL   Blood: x / Protein: 100 mg/dL / Nitrite: Negative   Leuk Esterase: Negative / RBC: 0 /HPF / WBC 2 /HPF   Sq Epi: x / Non Sq Epi: x / Bacteria: Many /HPF      CAPILLARY BLOOD GLUCOSE      POCT Blood Glucose.: 414 mg/dL (2024 15:29)  POCT Blood Glucose.: 353 mg/dL (2024 12:31)  POCT Blood Glucose.: 373 mg/dL (2024 11:18)  POCT Blood Glucose.: 309 mg/dL (2024 08:16)  POCT Blood Glucose.: 392 mg/dL (2024 22:02)      Culture - Blood (collected 2024 22:23)  Source: .Blood Blood-Venous  Gram Stain (prelim) (2024 03:57):    Growth in aerobic bottle: Gram Positive Cocci in Clusters    Growth in anaerobic bottle: Gram Positive Cocci in Clusters  Preliminary Report (2024 03:57):    Growth in aerobic bottle: Gram Positive Cocci in Clusters    Growth in anaerobic bottle: Gram Positive Cocci in Clusters    Direct identification is available within approximately 3-5    hours either by Blood Panel Multiplexed PCR or Direct    MALDI-TOF. Details: [email or web address]  Organism: Blood Culture PCR (2024 02:30)  Organism: Blood Culture PCR (2024 02:30)    Culture - Blood (collected 2024 22:23)  Source: .Blood Blood-Peripheral  Gram Stain (prelim) (2024 02:43):    Growth in anaerobic bottle: Gram Positive Cocci in Clusters    Growth in aerobic bottle: Gram Positive Cocci in Clusters  Preliminary Report (2024 02:43):    Growth in anaerobic bottle: Gram Positive Cocci in Clusters    Growth in aerobic bottle: Gram Positive Cocci in Clusters      RADIOLOGY & ADDITIONAL TESTS:    Imaging Personally Reviewed:  [ ] YES  [ ] NO    Consultant(s) Notes Reviewed:  [ ] YES  [ ] NO    Care Discussed with Consultants/Other Providers [ ] YES  [ ] NO Patient is a 79y old  Female who presents with a chief complaint of acute hypoxic respiratory failure 2/2 COVID PNA (2024 14:38)      INTERVAL HPI/OVERNIGHT EVENTS:  Pt was seen and examined, no acute events.      MEDICATIONS  (STANDING):  acetaminophen     Tablet .. 975 milliGRAM(s) Oral once  albuterol    90 MICROgram(s) HFA Inhaler 2 Puff(s) Inhalation every 6 hours  atorvastatin 40 milliGRAM(s) Oral at bedtime  carvedilol 25 milliGRAM(s) Oral every 12 hours  cefTRIAXone   IVPB 1000 milliGRAM(s) IV Intermittent every 24 hours  dexAMETHasone  Injectable 6 milliGRAM(s) IV Push daily  dextrose 5%. 1000 milliLiter(s) (50 mL/Hr) IV Continuous <Continuous>  dextrose 5%. 1000 milliLiter(s) (100 mL/Hr) IV Continuous <Continuous>  dextrose 50% Injectable 25 Gram(s) IV Push once  dextrose 50% Injectable 25 Gram(s) IV Push once  dextrose 50% Injectable 12.5 Gram(s) IV Push once  dorzolamide 2%/timolol 0.5% Ophthalmic Solution 1 Drop(s) Both EYES two times a day  enoxaparin Injectable 40 milliGRAM(s) SubCutaneous every 12 hours  glucagon  Injectable 1 milliGRAM(s) IntraMuscular once  hydrochlorothiazide 12.5 milliGRAM(s) Oral with breakfast  insulin glargine Injectable (LANTUS) 39 Unit(s) SubCutaneous every morning  insulin lispro (ADMELOG) corrective regimen sliding scale   SubCutaneous at bedtime  insulin lispro (ADMELOG) corrective regimen sliding scale   SubCutaneous three times a day before meals  insulin lispro Injectable (ADMELOG) 15 Unit(s) SubCutaneous three times a day before meals  latanoprost 0.005% Ophthalmic Solution 1 Drop(s) Both EYES at bedtime  levothyroxine 50 MICROGram(s) Oral daily  losartan 100 milliGRAM(s) Oral with breakfast  remdesivir  IVPB   IV Intermittent   remdesivir  IVPB 100 milliGRAM(s) IV Intermittent every 24 hours    MEDICATIONS  (PRN):  acetaminophen     Tablet .. 650 milliGRAM(s) Oral every 6 hours PRN Temp greater or equal to 38C (100.4F), Mild Pain (1 - 3)  aluminum hydroxide/magnesium hydroxide/simethicone Suspension 30 milliLiter(s) Oral every 4 hours PRN Dyspepsia  dextrose Oral Gel 15 Gram(s) Oral once PRN Blood Glucose LESS THAN 70 milliGRAM(s)/deciliter  melatonin 3 milliGRAM(s) Oral at bedtime PRN Insomnia  ondansetron Injectable 4 milliGRAM(s) IV Push every 8 hours PRN Nausea and/or Vomiting      Allergies    No Known Allergies    Intolerances          Vital Signs Last 24 Hrs  T(C): 36.9 (2024 15:20), Max: 36.9 (2024 05:19)  T(F): 98.4 (2024 15:20), Max: 98.5 (2024 05:19)  HR: 74 (2024 18:07) (67 - 77)  BP: 151/63 (2024 18:07) (116/64 - 151/63)  BP(mean): --  RR: 18 (2024 18:07) (17 - 18)  SpO2: 96% (2024 18:07) (94% - 100%)    Parameters below as of 2024 18:07  Patient On (Oxygen Delivery Method): room air        PHYSICAL EXAM:  GENERAL: NAD  HEAD:  Atraumatic   EYES: PERRLA  ENMT: Mouth moist  NECK: Supple  NERVOUS SYSTEM:  Awake, alert  CHEST/LUNG: Clear  HEART: RRR, S1, S2  ABDOMEN: Soft, non tender  EXTREMITIES:  no edema BL LE  SKIN: No rash        LABS:                        10.6   7.49  )-----------( 157      ( 2024 10:45 )             30.2     07-31    x   |  x   |  x   ----------------------------<  x   x    |  x   |  1.09    Ca    8.8      2024 06:40    TPro  6.8  /  Alb  2.7<L>  /  TBili  0.5  /  DBili  0.2  /  AST  8<L>  /  ALT  18  /  AlkPhos  69  07-31    PT/INR - ( 2024 06:40 )   PT: 12.8 sec;   INR: 1.07 ratio           Urinalysis Basic - ( 2024 05:42 )    Color: Yellow / Appearance: Clear / S.023 / pH: x  Gluc: x / Ketone: Negative mg/dL  / Bili: Negative / Urobili: 0.2 mg/dL   Blood: x / Protein: 100 mg/dL / Nitrite: Negative   Leuk Esterase: Negative / RBC: 0 /HPF / WBC 2 /HPF   Sq Epi: x / Non Sq Epi: x / Bacteria: Many /HPF      CAPILLARY BLOOD GLUCOSE      POCT Blood Glucose.: 414 mg/dL (2024 15:29)  POCT Blood Glucose.: 353 mg/dL (2024 12:31)  POCT Blood Glucose.: 373 mg/dL (2024 11:18)  POCT Blood Glucose.: 309 mg/dL (2024 08:16)  POCT Blood Glucose.: 392 mg/dL (2024 22:02)      Culture - Blood (collected 2024 22:23)  Source: .Blood Blood-Venous  Gram Stain (prelim) (2024 03:57):    Growth in aerobic bottle: Gram Positive Cocci in Clusters    Growth in anaerobic bottle: Gram Positive Cocci in Clusters  Preliminary Report (2024 03:57):    Growth in aerobic bottle: Gram Positive Cocci in Clusters    Growth in anaerobic bottle: Gram Positive Cocci in Clusters    Direct identification is available within approximately 3-5    hours either by Blood Panel Multiplexed PCR or Direct    MALDI-TOF. Details: [email or web address]  Organism: Blood Culture PCR (2024 02:30)  Organism: Blood Culture PCR (2024 02:30)    Culture - Blood (collected 2024 22:23)  Source: .Blood Blood-Peripheral  Gram Stain (prelim) (2024 02:43):    Growth in anaerobic bottle: Gram Positive Cocci in Clusters    Growth in aerobic bottle: Gram Positive Cocci in Clusters  Preliminary Report (2024 02:43):    Growth in anaerobic bottle: Gram Positive Cocci in Clusters    Growth in aerobic bottle: Gram Positive Cocci in Clusters      RADIOLOGY & ADDITIONAL TESTS:    Imaging Personally Reviewed:  [ ] YES  [ ] NO    Consultant(s) Notes Reviewed:  [ ] YES  [ ] NO    Care Discussed with Consultants/Other Providers [ ] YES  [ ] NO

## 2024-07-31 NOTE — PHARMACOTHERAPY INTERVENTION NOTE - COMMENTS
Recommended to dc azithromycin as urine legionella antigen is negative.
Recommended to change Lovenox from 40mg q24h to q12h in the setting of COVID.  BMI greater than 30..provider agreed
s/w dr jo -  recommended pre-meal admelog coverage TID however md wants to add 10 units of lantus HS in addition to 39 units of AM lantus

## 2024-08-01 ENCOUNTER — TRANSCRIPTION ENCOUNTER (OUTPATIENT)
Age: 79
End: 2024-08-01

## 2024-08-01 LAB
-  CLINDAMYCIN: SIGNIFICANT CHANGE UP
-  ERYTHROMYCIN: SIGNIFICANT CHANGE UP
-  GENTAMICIN: SIGNIFICANT CHANGE UP
-  OXACILLIN: SIGNIFICANT CHANGE UP
-  RIFAMPIN: SIGNIFICANT CHANGE UP
-  TETRACYCLINE: SIGNIFICANT CHANGE UP
-  TRIMETHOPRIM/SULFAMETHOXAZOLE: SIGNIFICANT CHANGE UP
-  VANCOMYCIN: SIGNIFICANT CHANGE UP
ALBUMIN SERPL ELPH-MCNC: 2.7 G/DL — LOW (ref 3.3–5)
ALP SERPL-CCNC: 70 U/L — SIGNIFICANT CHANGE UP (ref 40–120)
ALT FLD-CCNC: 17 U/L — SIGNIFICANT CHANGE UP (ref 12–78)
AST SERPL-CCNC: 6 U/L — LOW (ref 15–37)
BILIRUB DIRECT SERPL-MCNC: 0.1 MG/DL — SIGNIFICANT CHANGE UP (ref 0–0.3)
BILIRUB INDIRECT FLD-MCNC: 0.3 MG/DL — SIGNIFICANT CHANGE UP (ref 0.2–1)
BILIRUB SERPL-MCNC: 0.4 MG/DL — SIGNIFICANT CHANGE UP (ref 0.2–1.2)
CREAT SERPL-MCNC: 1.14 MG/DL — SIGNIFICANT CHANGE UP (ref 0.5–1.3)
CRP SERPL-MCNC: 62 MG/L — HIGH
CULTURE RESULTS: ABNORMAL
CULTURE RESULTS: ABNORMAL
D DIMER BLD IA.RAPID-MCNC: 663 NG/ML DDU — HIGH
EGFR: 49 ML/MIN/1.73M2 — LOW
FERRITIN SERPL-MCNC: 333 NG/ML — HIGH (ref 13–330)
GLUCOSE BLDC GLUCOMTR-MCNC: 204 MG/DL — HIGH (ref 70–99)
GLUCOSE BLDC GLUCOMTR-MCNC: 235 MG/DL — HIGH (ref 70–99)
GLUCOSE BLDC GLUCOMTR-MCNC: 260 MG/DL — HIGH (ref 70–99)
GLUCOSE BLDC GLUCOMTR-MCNC: 265 MG/DL — HIGH (ref 70–99)
GLUCOSE BLDC GLUCOMTR-MCNC: 378 MG/DL — HIGH (ref 70–99)
GRAM STN FLD: ABNORMAL
GRAM STN FLD: ABNORMAL
INR BLD: 1.07 RATIO — SIGNIFICANT CHANGE UP (ref 0.85–1.18)
METHOD TYPE: SIGNIFICANT CHANGE UP
ORGANISM # SPEC MICROSCOPIC CNT: ABNORMAL
ORGANISM # SPEC MICROSCOPIC CNT: ABNORMAL
ORGANISM # SPEC MICROSCOPIC CNT: SIGNIFICANT CHANGE UP
ORGANISM # SPEC MICROSCOPIC CNT: SIGNIFICANT CHANGE UP
PROT SERPL-MCNC: 6.9 GM/DL — SIGNIFICANT CHANGE UP (ref 6–8.3)
PROTHROM AB SERPL-ACNC: 12.8 SEC — SIGNIFICANT CHANGE UP (ref 9.5–13)
SPECIMEN SOURCE: SIGNIFICANT CHANGE UP
SPECIMEN SOURCE: SIGNIFICANT CHANGE UP

## 2024-08-01 PROCEDURE — 99232 SBSQ HOSP IP/OBS MODERATE 35: CPT

## 2024-08-01 PROCEDURE — 99239 HOSP IP/OBS DSCHRG MGMT >30: CPT

## 2024-08-01 RX ORDER — METHYLPREDNISOLONE ACETATE 40 MG/ML
1 INJECTION, SUSPENSION INTRA-ARTICULAR; INTRALESIONAL; INTRAMUSCULAR; INTRASYNOVIAL; SOFT TISSUE
Qty: 1 | Refills: 0
Start: 2024-08-01 | End: 2024-08-06

## 2024-08-01 RX ORDER — PROBIOTIC PRODUCT - TAB 1B-250 MG
1 TAB ORAL DAILY
Refills: 0 | Status: DISCONTINUED | OUTPATIENT
Start: 2024-08-01 | End: 2024-08-02

## 2024-08-01 RX ORDER — NIFEDIPINE 20 MG/1
60 CAPSULE, LIQUID FILLED ORAL DAILY
Refills: 0 | Status: DISCONTINUED | OUTPATIENT
Start: 2024-08-01 | End: 2024-08-01

## 2024-08-01 RX ORDER — INSULIN LISPRO 100/ML
3 VIAL (ML) SUBCUTANEOUS ONCE
Refills: 0 | Status: COMPLETED | OUTPATIENT
Start: 2024-08-01 | End: 2024-08-01

## 2024-08-01 RX ORDER — NIFEDIPINE 20 MG/1
60 CAPSULE, LIQUID FILLED ORAL
Refills: 0 | Status: DISCONTINUED | OUTPATIENT
Start: 2024-08-01 | End: 2024-08-02

## 2024-08-01 RX ORDER — INSULIN LISPRO 100/ML
3 VIAL (ML) SUBCUTANEOUS ONCE
Refills: 0 | Status: DISCONTINUED | OUTPATIENT
Start: 2024-08-01 | End: 2024-08-01

## 2024-08-01 RX ADMIN — Medication 3 UNIT(S): at 00:55

## 2024-08-01 RX ADMIN — ENOXAPARIN SODIUM 40 MILLIGRAM(S): 120 INJECTION SUBCUTANEOUS at 05:57

## 2024-08-01 RX ADMIN — ENOXAPARIN SODIUM 40 MILLIGRAM(S): 120 INJECTION SUBCUTANEOUS at 17:39

## 2024-08-01 RX ADMIN — LOSARTAN POTASSIUM 100 MILLIGRAM(S): 50 TABLET, FILM COATED ORAL at 06:07

## 2024-08-01 RX ADMIN — DEXAMETHASONE 6 MILLIGRAM(S): 1.5 TABLET ORAL at 05:56

## 2024-08-01 RX ADMIN — Medication 25 MILLIGRAM(S): at 05:55

## 2024-08-01 RX ADMIN — Medication 100 MILLIGRAM(S): at 22:06

## 2024-08-01 RX ADMIN — REMDESIVIR 200 MILLIGRAM(S): 100 INJECTION, POWDER, LYOPHILIZED, FOR SOLUTION INTRAVENOUS at 22:06

## 2024-08-01 RX ADMIN — NIFEDIPINE 60 MILLIGRAM(S): 20 CAPSULE, LIQUID FILLED ORAL at 23:24

## 2024-08-01 RX ADMIN — ATORVASTATIN CALCIUM 40 MILLIGRAM(S): 40 TABLET, FILM COATED ORAL at 22:06

## 2024-08-01 RX ADMIN — Medication 2 PUFF(S): at 06:03

## 2024-08-01 RX ADMIN — Medication 1: at 22:08

## 2024-08-01 RX ADMIN — Medication 1 DROP(S): at 22:10

## 2024-08-01 RX ADMIN — Medication 50 MICROGRAM(S): at 05:56

## 2024-08-01 RX ADMIN — Medication 15 UNIT(S): at 12:05

## 2024-08-01 RX ADMIN — Medication 15 UNIT(S): at 08:37

## 2024-08-01 RX ADMIN — Medication 25 MILLIGRAM(S): at 17:39

## 2024-08-01 RX ADMIN — Medication 3: at 08:36

## 2024-08-01 RX ADMIN — INSULIN GLARGINE-YFGN 39 UNIT(S): 100 INJECTION, SOLUTION SUBCUTANEOUS at 08:36

## 2024-08-01 RX ADMIN — Medication 2 PUFF(S): at 12:06

## 2024-08-01 RX ADMIN — Medication 2 PUFF(S): at 00:56

## 2024-08-01 RX ADMIN — Medication 2 PUFF(S): at 17:38

## 2024-08-01 RX ADMIN — DORZOLAMIDE HYDROCHLORIDE TIMOLOL MALEATE 1 DROP(S): 20; 5 SOLUTION/ DROPS OPHTHALMIC at 06:03

## 2024-08-01 RX ADMIN — DORZOLAMIDE HYDROCHLORIDE TIMOLOL MALEATE 1 DROP(S): 20; 5 SOLUTION/ DROPS OPHTHALMIC at 17:38

## 2024-08-01 RX ADMIN — Medication 15 UNIT(S): at 17:40

## 2024-08-01 RX ADMIN — PROBIOTIC PRODUCT - TAB 1 TABLET(S): TAB at 17:39

## 2024-08-01 NOTE — DISCHARGE NOTE NURSING/CASE MANAGEMENT/SOCIAL WORK - PATIENT PORTAL LINK FT
You can access the FollowMyHealth Patient Portal offered by [facility] by registering at the following website: [email or web address]. By joining [email or web address]’s FollowMyHealth portal, you will also be able to view your health information using other applications (apps) compatible with our system.

## 2024-08-01 NOTE — PROGRESS NOTE ADULT - SUBJECTIVE AND OBJECTIVE BOX
Patient is a 79y old  Female who presents with a chief complaint of acute hypoxic respiratory failure 2/2 COVID PNA (2024 19:19)      INTERVAL HPI/OVERNIGHT EVENTS:  Pt was seen and examined, no acute events.  She complains of wkness and occasional dizziness. also had diarrhea x 3       MEDICATIONS  (STANDING):  acetaminophen     Tablet .. 975 milliGRAM(s) Oral once  albuterol    90 MICROgram(s) HFA Inhaler 2 Puff(s) Inhalation every 6 hours  atorvastatin 40 milliGRAM(s) Oral at bedtime  carvedilol 25 milliGRAM(s) Oral every 12 hours  cefTRIAXone   IVPB 1000 milliGRAM(s) IV Intermittent every 24 hours  dextrose 5%. 1000 milliLiter(s) (100 mL/Hr) IV Continuous <Continuous>  dextrose 5%. 1000 milliLiter(s) (50 mL/Hr) IV Continuous <Continuous>  dextrose 50% Injectable 25 Gram(s) IV Push once  dextrose 50% Injectable 25 Gram(s) IV Push once  dextrose 50% Injectable 12.5 Gram(s) IV Push once  dorzolamide 2%/timolol 0.5% Ophthalmic Solution 1 Drop(s) Both EYES two times a day  enoxaparin Injectable 40 milliGRAM(s) SubCutaneous every 12 hours  glucagon  Injectable 1 milliGRAM(s) IntraMuscular once  hydrochlorothiazide 12.5 milliGRAM(s) Oral with breakfast  insulin glargine Injectable (LANTUS) 39 Unit(s) SubCutaneous every morning  insulin lispro (ADMELOG) corrective regimen sliding scale   SubCutaneous at bedtime  insulin lispro Injectable (ADMELOG) 15 Unit(s) SubCutaneous three times a day before meals  lactobacillus acidophilus 1 Tablet(s) Oral daily  latanoprost 0.005% Ophthalmic Solution 1 Drop(s) Both EYES at bedtime  levothyroxine 50 MICROGram(s) Oral daily  losartan 100 milliGRAM(s) Oral with breakfast  remdesivir  IVPB   IV Intermittent   remdesivir  IVPB 100 milliGRAM(s) IV Intermittent every 24 hours    MEDICATIONS  (PRN):  acetaminophen     Tablet .. 650 milliGRAM(s) Oral every 6 hours PRN Temp greater or equal to 38C (100.4F), Mild Pain (1 - 3)  aluminum hydroxide/magnesium hydroxide/simethicone Suspension 30 milliLiter(s) Oral every 4 hours PRN Dyspepsia  dextrose Oral Gel 15 Gram(s) Oral once PRN Blood Glucose LESS THAN 70 milliGRAM(s)/deciliter  melatonin 3 milliGRAM(s) Oral at bedtime PRN Insomnia  ondansetron Injectable 4 milliGRAM(s) IV Push every 8 hours PRN Nausea and/or Vomiting      Allergies  No Known Allergies        Vital Signs Last 24 Hrs  T(C): 37.1 (01 Aug 2024 17:10), Max: 37.1 (01 Aug 2024 17:10)  T(F): 98.7 (01 Aug 2024 17:10), Max: 98.7 (01 Aug 2024 17:10)  HR: 78 (01 Aug 2024 17:11) (60 - 78)  BP: 183/79 (01 Aug 2024 17:10) (152/62 - 183/79)  BP(mean): --  RR: 19 (01 Aug 2024 17:10) (16 - 19)  SpO2: 98% (01 Aug 2024 17:11) (95% - 100%)    Parameters below as of 01 Aug 2024 17:10  Patient On (Oxygen Delivery Method): room air        PHYSICAL EXAM:  GENERAL: NAD  HEAD:  Atraumatic   EYES: PERRLA  ENMT: Mouth moist  NECK: Supple  NERVOUS SYSTEM:  Awake, alert  CHEST/LUNG: Clear  HEART: RRR, S1, S2  ABDOMEN: Soft, non tender  EXTREMITIES:  no edema BL LE  SKIN: No rash        LABS:                        10.6   7.49  )-----------( 157      ( 2024 10:45 )             30.2     08-01    x   |  x   |  x   ----------------------------<  x   x    |  x   |  1.14      TPro  6.9  /  Alb  2.7<L>  /  TBili  0.4  /  DBili  0.1  /  AST  6<L>  /  ALT  17  /  AlkPhos  70  08-    PT/INR - ( 01 Aug 2024 05:30 )   PT: 12.8 sec;   INR: 1.07 ratio           Urinalysis Basic - ( 2024 05:42 )    Color: Yellow / Appearance: Clear / S.023 / pH: x  Gluc: x / Ketone: Negative mg/dL  / Bili: Negative / Urobili: 0.2 mg/dL   Blood: x / Protein: 100 mg/dL / Nitrite: Negative   Leuk Esterase: Negative / RBC: 0 /HPF / WBC 2 /HPF   Sq Epi: x / Non Sq Epi: x / Bacteria: Many /HPF      CAPILLARY BLOOD GLUCOSE      POCT Blood Glucose.: 204 mg/dL (01 Aug 2024 16:37)  POCT Blood Glucose.: 235 mg/dL (01 Aug 2024 11:49)  POCT Blood Glucose.: 260 mg/dL (01 Aug 2024 07:56)  POCT Blood Glucose.: 378 mg/dL (01 Aug 2024 00:10)  POCT Blood Glucose.: 495 mg/dL (2024 21:26)  POCT Blood Glucose.: 470 mg/dL (2024 21:23)      Culture - Blood (collected 2024 10:45)  Source: .Blood Blood  Preliminary Report (01 Aug 2024 16:02):    No growth at 24 hours    Culture - Blood (collected 2024 22:23)  Source: .Blood Blood-Venous  Gram Stain (01 Aug 2024 11:32):    Growth in aerobic bottle: Gram Positive Cocci in Clusters    Growth in anaerobic bottle: Gram Positive Cocci in Clusters  Final Report (01 Aug 2024 11:32):    Growth in aerobic and anaerobic bottles: Staphylococcus epidermidis    "Susceptibilities not performed"    Direct identification is available within approximately 3-5    hours either by Blood Panel Multiplexed PCR or Direct    MALDI-TOF. Details: [email or web address]  Organism: Blood Culture PCR (01 Aug 2024 11:32)  Organism: Blood Culture PCR (01 Aug 2024 11:32)    Culture - Blood (collected 2024 22:23)  Source: .Blood Blood-Peripheral  Gram Stain (01 Aug 2024 19:39):    Growth in anaerobic bottle: Gram Positive Cocci in Clusters    Growth in aerobic bottle: Gram Positive Cocci in Clusters  Final Report (01 Aug 2024 19:39):    Growth in aerobic and anaerobic bottles: Staphylococcus epidermidis  Organism: Staphylococcus epidermidis (01 Aug 2024 19:39)  Organism: Staphylococcus epidermidis (01 Aug 2024 19:39)      RADIOLOGY & ADDITIONAL TESTS:    Imaging Personally Reviewed:  [ ] YES  [ ] NO    Consultant(s) Notes Reviewed:  [ ] YES  [ ] NO    Care Discussed with Consultants/Other Providers [ ] YES  [ ] NO

## 2024-08-01 NOTE — PROGRESS NOTE ADULT - PROBLEM SELECTOR PLAN 1
Due to Superimposed Bacterial PNA secondary to COVID  Started on Rocephin, stopped azithro  Rapid response called for increased SOB and desaturations- bibasilar crackles- Lasix 40mg IV x1 ordered  clinically not volume overloaded  remdesvir  Ceftriaxone day 3  IV decadron  MRSA neg  SO2 in RA after ambulation WNL    Staph epidermidis bacteremia, likely contaminated, repeat neg.    Complains of diarrhea today, likely 2/2 abx, added probiotic

## 2024-08-01 NOTE — PROGRESS NOTE ADULT - REASON FOR ADMISSION
acute hypoxic respiratory failure 2/2 COVID PNA

## 2024-08-01 NOTE — PROGRESS NOTE ADULT - PROBLEM SELECTOR PLAN 5
Hb A 1c 6.9  With hyperglycemia 2/2 steroid   Home regimen: basal 48 units daily, metformin 1g BID  On Lantus 39 U QHS, add pre meal insulin
Chronic moderate exacerbation     Home regimen: basal 48 units daily, metformin 1g BID  Continue with basal 39 units daily + LDCS  A1c and lipid panel in AM
Hb A 1c 6.9  With hyperglycemia 2/2 steroid   Home regimen: basal 48 units daily, metformin 1g BID  On Lantus 39 U QHS, added pre meal insulin

## 2024-08-01 NOTE — PROGRESS NOTE ADULT - PROBLEM SELECTOR PLAN 4
Chronic stable   Continue coreg 25mg BID and telmisartan-hctz 80-12.5mg daily formulary   Hold nifedipine 60mg BID and aldactone 25mg daily  Restart as appropriate  Monitor
Chronic stable   Continue coreg 25mg BID and telmisartan-hctz 80-12.5mg daily formulary   Hold nifedipine 60mg BID and aldactone 25mg daily  Restart as appropriate  Monitor
Chronic stable   /89  Continue coreg 25mg BID and telmisartan-hctz 80-12.5mg daily formulary   Hold nifedipine 60mg BID and aldactone 25mg daily  Restart as appropriate  Monitor

## 2024-08-01 NOTE — PROGRESS NOTE ADULT - ASSESSMENT
79 yr old female presenting with acute hypoxic respiratory failure 2/2 COVID PNA

## 2024-08-01 NOTE — PROVIDER CONTACT NOTE (OTHER) - NAME OF MD/NP/PA/DO NOTIFIED:
LIMA Dixon Complex Repair And Skin Substitute Graft Text: The defect edges were debeveled with a #15 scalpel blade.  The primary defect was closed partially with a complex linear closure.  Given the location of the remaining defect, shape of the defect and the proximity to free margins a skin substitute graft was deemed most appropriate to repair the remaining defect.  The graft was trimmed to fit the size of the remaining defect.  The graft was then placed in the primary defect, oriented appropriately, and sutured into place.

## 2024-08-01 NOTE — DISCHARGE NOTE NURSING/CASE MANAGEMENT/SOCIAL WORK - NSDCFUADDAPPT_GEN_ALL_CORE_FT
It is important to see your primary physician as well as the physicians noted below within the next week to perform a comprehensive medical review.  Call their offices for an appointment as soon as you leave the hospital.  You will also need to see them for renewal of your medications.  If you do not have a primary physician, contact the [facility] Physician Referral Service at [phone].  To obtain your results, you can access the [facility] Patient Portal at [email or web address].  Your medical issues appear to be stable at this time, but if your symptoms recur or worsen, contact your physicians and/or return to the hospital if necessary.  If you encounter any issues or questions with your medication, call your physicians before stopping the medication.

## 2024-08-01 NOTE — DISCHARGE NOTE NURSING/CASE MANAGEMENT/SOCIAL WORK - NSDCPEFALRISK_GEN_ALL_CORE
For information on Fall & Injury Prevention, visit: [email or web address] OR  [email or web address] OR  https://www.cdc.gov/steadi/patient.html

## 2024-08-01 NOTE — PROGRESS NOTE ADULT - PROBLEM SELECTOR PLAN 6
Chronic stable  Continue atorvastatin 40mg daily   Lipid panel in AM
Chronic stable  Continue atorvastatin 40mg daily
Chronic stable  Continue atorvastatin 40mg daily

## 2024-08-02 VITALS — HEART RATE: 56 BPM | OXYGEN SATURATION: 96 %

## 2024-08-02 LAB
ALBUMIN SERPL ELPH-MCNC: 2.8 G/DL — LOW (ref 3.3–5)
ALBUMIN SERPL ELPH-MCNC: 2.9 G/DL — LOW (ref 3.3–5)
ALP SERPL-CCNC: 66 U/L — SIGNIFICANT CHANGE UP (ref 40–120)
ALP SERPL-CCNC: 67 U/L — SIGNIFICANT CHANGE UP (ref 40–120)
ALT FLD-CCNC: 19 U/L — SIGNIFICANT CHANGE UP (ref 12–78)
ALT FLD-CCNC: 21 U/L — SIGNIFICANT CHANGE UP (ref 12–78)
ANION GAP SERPL CALC-SCNC: 4 MMOL/L — LOW (ref 5–17)
AST SERPL-CCNC: 13 U/L — LOW (ref 15–37)
AST SERPL-CCNC: 13 U/L — LOW (ref 15–37)
BILIRUB DIRECT SERPL-MCNC: 0.1 MG/DL — SIGNIFICANT CHANGE UP (ref 0–0.3)
BILIRUB INDIRECT FLD-MCNC: 0.4 MG/DL — SIGNIFICANT CHANGE UP (ref 0.2–1)
BILIRUB SERPL-MCNC: 0.5 MG/DL — SIGNIFICANT CHANGE UP (ref 0.2–1.2)
BILIRUB SERPL-MCNC: 0.5 MG/DL — SIGNIFICANT CHANGE UP (ref 0.2–1.2)
BUN SERPL-MCNC: 23 MG/DL — SIGNIFICANT CHANGE UP (ref 7–23)
CALCIUM SERPL-MCNC: 9 MG/DL — SIGNIFICANT CHANGE UP (ref 8.5–10.1)
CHLORIDE SERPL-SCNC: 103 MMOL/L — SIGNIFICANT CHANGE UP (ref 96–108)
CO2 SERPL-SCNC: 30 MMOL/L — SIGNIFICANT CHANGE UP (ref 22–31)
CREAT SERPL-MCNC: 0.91 MG/DL — SIGNIFICANT CHANGE UP (ref 0.5–1.3)
CREAT SERPL-MCNC: 0.93 MG/DL — SIGNIFICANT CHANGE UP (ref 0.5–1.3)
EGFR: 63 ML/MIN/1.73M2 — SIGNIFICANT CHANGE UP
EGFR: 64 ML/MIN/1.73M2 — SIGNIFICANT CHANGE UP
GLUCOSE BLDC GLUCOMTR-MCNC: 152 MG/DL — HIGH (ref 70–99)
GLUCOSE BLDC GLUCOMTR-MCNC: 160 MG/DL — HIGH (ref 70–99)
GLUCOSE SERPL-MCNC: 170 MG/DL — HIGH (ref 70–99)
HCT VFR BLD CALC: 34.7 % — SIGNIFICANT CHANGE UP (ref 34.5–45)
HGB BLD-MCNC: 12.4 G/DL — SIGNIFICANT CHANGE UP (ref 11.5–15.5)
INR BLD: 1.14 RATIO — SIGNIFICANT CHANGE UP (ref 0.85–1.18)
MCHC RBC-ENTMCNC: 30.4 PG — SIGNIFICANT CHANGE UP (ref 27–34)
MCHC RBC-ENTMCNC: 35.7 G/DL — SIGNIFICANT CHANGE UP (ref 32–36)
MCV RBC AUTO: 85 FL — SIGNIFICANT CHANGE UP (ref 80–100)
NRBC # BLD: 0 /100 WBCS — SIGNIFICANT CHANGE UP (ref 0–0)
PLATELET # BLD AUTO: 206 K/UL — SIGNIFICANT CHANGE UP (ref 150–400)
POTASSIUM SERPL-MCNC: 3.4 MMOL/L — LOW (ref 3.5–5.3)
POTASSIUM SERPL-SCNC: 3.4 MMOL/L — LOW (ref 3.5–5.3)
PROT SERPL-MCNC: 6.7 GM/DL — SIGNIFICANT CHANGE UP (ref 6–8.3)
PROT SERPL-MCNC: 6.9 GM/DL — SIGNIFICANT CHANGE UP (ref 6–8.3)
PROTHROM AB SERPL-ACNC: 13.5 SEC — HIGH (ref 9.5–13)
RBC # BLD: 4.08 M/UL — SIGNIFICANT CHANGE UP (ref 3.8–5.2)
RBC # FLD: 12.2 % — SIGNIFICANT CHANGE UP (ref 10.3–14.5)
SODIUM SERPL-SCNC: 137 MMOL/L — SIGNIFICANT CHANGE UP (ref 135–145)
WBC # BLD: 9.17 K/UL — SIGNIFICANT CHANGE UP (ref 3.8–10.5)
WBC # FLD AUTO: 9.17 K/UL — SIGNIFICANT CHANGE UP (ref 3.8–10.5)

## 2024-08-02 PROCEDURE — 99239 HOSP IP/OBS DSCHRG MGMT >30: CPT

## 2024-08-02 RX ORDER — PROBIOTIC PRODUCT - TAB 1B-250 MG
1 TAB ORAL
Qty: 21 | Refills: 0
Start: 2024-08-02 | End: 2024-08-08

## 2024-08-02 RX ORDER — RIVAROXABAN 15 MG-20MG
1 KIT ORAL
Qty: 30 | Refills: 0
Start: 2024-08-02 | End: 2024-08-31

## 2024-08-02 RX ORDER — SPIRONOLACTONE 50 MG/1
0 TABLET, FILM COATED ORAL
Qty: 0 | Refills: 0 | DISCHARGE
Start: 2024-08-02

## 2024-08-02 RX ORDER — CARVEDILOL PHOSPHATE 80 MG
0 CAPSULE,EXTENDED RELEASE MULTIPHASE 24HR ORAL
Qty: 0 | Refills: 0 | DISCHARGE
Start: 2024-08-02

## 2024-08-02 RX ORDER — HYDRALAZINE HYDROCHLORIDE 100 MG/1
5 TABLET ORAL ONCE
Refills: 0 | Status: DISCONTINUED | OUTPATIENT
Start: 2024-08-02 | End: 2024-08-02

## 2024-08-02 RX ORDER — POTASSIUM CHLORIDE 1500 MG/1
40 TABLET, EXTENDED RELEASE ORAL ONCE
Refills: 0 | Status: COMPLETED | OUTPATIENT
Start: 2024-08-02 | End: 2024-08-02

## 2024-08-02 RX ADMIN — Medication 50 MICROGRAM(S): at 05:32

## 2024-08-02 RX ADMIN — NIFEDIPINE 60 MILLIGRAM(S): 20 CAPSULE, LIQUID FILLED ORAL at 05:32

## 2024-08-02 RX ADMIN — ENOXAPARIN SODIUM 40 MILLIGRAM(S): 120 INJECTION SUBCUTANEOUS at 05:33

## 2024-08-02 RX ADMIN — Medication 2 PUFF(S): at 12:02

## 2024-08-02 RX ADMIN — DORZOLAMIDE HYDROCHLORIDE TIMOLOL MALEATE 1 DROP(S): 20; 5 SOLUTION/ DROPS OPHTHALMIC at 05:40

## 2024-08-02 RX ADMIN — Medication 2 PUFF(S): at 00:09

## 2024-08-02 RX ADMIN — POTASSIUM CHLORIDE 40 MILLIEQUIVALENT(S): 1500 TABLET, EXTENDED RELEASE ORAL at 12:01

## 2024-08-02 RX ADMIN — Medication 25 MILLIGRAM(S): at 05:34

## 2024-08-02 RX ADMIN — Medication 2 PUFF(S): at 05:40

## 2024-08-02 RX ADMIN — LOSARTAN POTASSIUM 100 MILLIGRAM(S): 50 TABLET, FILM COATED ORAL at 07:37

## 2024-08-02 RX ADMIN — Medication 15 UNIT(S): at 08:58

## 2024-08-02 RX ADMIN — INSULIN GLARGINE-YFGN 39 UNIT(S): 100 INJECTION, SOLUTION SUBCUTANEOUS at 09:00

## 2024-08-02 RX ADMIN — Medication 15 UNIT(S): at 12:02

## 2024-08-02 RX ADMIN — PROBIOTIC PRODUCT - TAB 1 TABLET(S): TAB at 13:52

## 2024-08-05 LAB
CULTURE RESULTS: SIGNIFICANT CHANGE UP
SPECIMEN SOURCE: SIGNIFICANT CHANGE UP

## 2024-08-08 NOTE — DISCHARGE NOTE PROVIDER - NSDCFUSCHEDAPPT_GEN_ALL_CORE_FT
Discussed with patient in outpatient Rad/onc setting due to Lung cancer w gastroesophageal and bone involvement during RT/CT.  PMH includes: COPD  Past Medical History:   Diagnosis Date    Arthritis     hips and back    COPD (chronic obstructive pulmonary disease)  (CMD)     Kidney stone     Malignant neoplasm  (CMD)     lung   Diet hx reveals banana, yogurt and granola for B, sandwich for L, variety of entrees for dinner likes chicken, egg salad, beef stroganoff, hotdogs, italian sausage. States can manage regular food textures with edentulous status, drinking Boost 1 x day. Beverages: water, Powerade, no soda, no milk but does drink 1 Boost daily. Eats fruit and vegetable most days. Wife cooks majority of meals.   Wt Readings from recent Encounters:   08/06/24 59.6 kg    131#    07/24/24 57 kg   06/17/24 57.9 kg    127#    05/09/24 61.9 kg   05/19/21 72.6 kg    160#    -160#   Pt instructed on adequate calories, protein, fluids during treatment and plant strong diet after treatment. Reviewed MNT to address BM normalization if applicable. Provided information from Academy of Nutrition and Dietetics [AND] or [facility] or [employer] [employer] [AICR] with RD contact information for follow up questions or concerns. NFPE:  met ASPEN severe PCM criteria July '24 per inpt RD charting   PES statement:   Predicted inadequate energy intake related to medical therapy or/or medication that decreases ability to consume sufficient energy as evidenced by food/fluid intake and anthropometric data.   Treatment Plan/Recommendations - Monitoring and Intervention:  1. Diet: Recommend a nutrient dense, protein rich diet   - prioritize protein at all meals/snacks    - reviewed soup and smoothie recipe and ONS options   Handouts included:  AICR healthy plate/shopping list   ACSM exercise guidelines during and after cancer treatment   Protein handout   Note: declined soft and easy to  chew recipe handout  2. Oral Nutrition Supplement: reviewed and provided samples, recommend 2 bottles daily   3. Discussed with interdisciplinary team who will monitor weight, intake trends, skin integrity; stool pattern. RD remains available via consult as needed.     [address]  Scheduled Appointment: 08/27/2024    Ruth Gaytan  [address]  Scheduled Appointment: 08/27/2024
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
## 2024-08-09 DIAGNOSIS — J98.59 OTHER DISEASES OF MEDIASTINUM, NOT ELSEWHERE CLASSIFIED: ICD-10-CM

## 2024-08-09 DIAGNOSIS — Z79.4 LONG TERM (CURRENT) USE OF INSULIN: ICD-10-CM

## 2024-08-09 DIAGNOSIS — K52.1 TOXIC GASTROENTERITIS AND COLITIS: ICD-10-CM

## 2024-08-09 DIAGNOSIS — E11.65 TYPE 2 DIABETES MELLITUS WITH HYPERGLYCEMIA: ICD-10-CM

## 2024-08-09 DIAGNOSIS — H40.9 UNSPECIFIED GLAUCOMA: ICD-10-CM

## 2024-08-09 DIAGNOSIS — T38.0X5A ADVERSE EFFECT OF GLUCOCORTICOIDS AND SYNTHETIC ANALOGUES, INITIAL ENCOUNTER: ICD-10-CM

## 2024-08-09 DIAGNOSIS — J96.01 ACUTE RESPIRATORY FAILURE WITH HYPOXIA: ICD-10-CM

## 2024-08-09 DIAGNOSIS — I10 ESSENTIAL (PRIMARY) HYPERTENSION: ICD-10-CM

## 2024-08-09 DIAGNOSIS — J12.82 PNEUMONIA DUE TO CORONAVIRUS DISEASE 2019: ICD-10-CM

## 2024-08-09 DIAGNOSIS — Z79.84 LONG TERM (CURRENT) USE OF ORAL HYPOGLYCEMIC DRUGS: ICD-10-CM

## 2024-08-09 DIAGNOSIS — U07.1 COVID-19: ICD-10-CM

## 2024-08-09 DIAGNOSIS — Z86.39 PERSONAL HISTORY OF OTHER ENDOCRINE, NUTRITIONAL AND METABOLIC DISEASE: ICD-10-CM

## 2024-08-09 DIAGNOSIS — E78.5 HYPERLIPIDEMIA, UNSPECIFIED: ICD-10-CM

## 2024-08-09 DIAGNOSIS — Z79.890 HORMONE REPLACEMENT THERAPY: ICD-10-CM

## 2024-08-09 DIAGNOSIS — T36.95XA ADVERSE EFFECT OF UNSPECIFIED SYSTEMIC ANTIBIOTIC, INITIAL ENCOUNTER: ICD-10-CM

## 2024-08-09 DIAGNOSIS — R06.02 SHORTNESS OF BREATH: ICD-10-CM

## 2024-08-09 DIAGNOSIS — Z79.899 OTHER LONG TERM (CURRENT) DRUG THERAPY: ICD-10-CM

## 2024-08-27 ENCOUNTER — APPOINTMENT (OUTPATIENT)
Age: 79
End: 2024-08-27
Payer: MEDICARE

## 2024-08-27 VITALS
OXYGEN SATURATION: 99 % | SYSTOLIC BLOOD PRESSURE: 130 MMHG | DIASTOLIC BLOOD PRESSURE: 54 MMHG | WEIGHT: 211.9 LBS | BODY MASS INDEX: 40.01 KG/M2 | HEIGHT: 60.83 IN | HEART RATE: 70 BPM

## 2024-08-27 DIAGNOSIS — I10 ESSENTIAL (PRIMARY) HYPERTENSION: ICD-10-CM

## 2024-08-27 DIAGNOSIS — U07.1 COVID-19: ICD-10-CM

## 2024-08-27 DIAGNOSIS — D35.1 BENIGN NEOPLASM OF PARATHYROID GLAND: ICD-10-CM

## 2024-08-27 DIAGNOSIS — J98.59 OTHER DISEASES OF MEDIASTINUM, NOT ELSEWHERE CLASSIFIED: ICD-10-CM

## 2024-08-27 DIAGNOSIS — E66.9 OBESITY, UNSPECIFIED: ICD-10-CM

## 2024-08-27 DIAGNOSIS — Z87.01 PERSONAL HISTORY OF PNEUMONIA (RECURRENT): ICD-10-CM

## 2024-08-27 DIAGNOSIS — I87.2 VENOUS INSUFFICIENCY (CHRONIC) (PERIPHERAL): ICD-10-CM

## 2024-08-27 PROCEDURE — 94729 DIFFUSING CAPACITY: CPT

## 2024-08-27 PROCEDURE — 94726 PLETHYSMOGRAPHY LUNG VOLUMES: CPT

## 2024-08-27 PROCEDURE — ZZZZZ: CPT

## 2024-08-27 PROCEDURE — 99215 OFFICE O/P EST HI 40 MIN: CPT | Mod: 25

## 2024-08-27 PROCEDURE — 94060 EVALUATION OF WHEEZING: CPT

## 2024-08-27 NOTE — PHYSICAL EXAM
[No Acute Distress] : no acute distress [Well Nourished] : well nourished [Well Groomed] : well groomed [Well Developed] : well developed [Normal Oropharynx] : normal oropharynx [Normal Appearance] : normal appearance [No Neck Mass] : no neck mass [Normal Rate/Rhythm] : normal rate/rhythm [Normal S1, S2] : normal s1, s2 [No Murmurs] : no murmurs [No Resp Distress] : no resp distress [No Acc Muscle Use] : no acc muscle use [Clear to Auscultation Bilaterally] : clear to auscultation bilaterally [Surgical scars] : surgical scars [Benign] : benign [Gait - Sufficient For Exercise Testing] : gait sufficient for exercise testing [No Clubbing] : no clubbing [No Cyanosis] : no cyanosis [2+ Pitting] : 2+ pitting [Normal Color/ Pigmentation] : normal color/ pigmentation [No Focal Deficits] : no focal deficits [Oriented x3] : oriented x3 [Normal Insight/judgment] : normal insight/judgment [Normal Affect] : normal affect

## 2024-09-03 NOTE — REVIEW OF SYSTEMS
[Fatigue] : fatigue [Thyroid Problem] : thyroid problem [Obesity] : obesity [Fever] : no fever [Recent Wt Gain (___ Lbs)] : ~T no recent weight gain [Chills] : no chills [Recent Wt Loss (___ Lbs)] : ~T no recent weight loss [Sore Throat] : no sore throat [Nasal Congestion] : no nasal congestion [Sinus Problems] : no sinus problems [Cough] : no cough [Chest Tightness] : no chest tightness [Sputum] : no sputum [Dyspnea] : no dyspnea [Pleuritic Pain] : no pleuritic pain [Wheezing] : no wheezing [SOB on Exertion] : no sob on exertion [Chest Discomfort] : no chest discomfort [Palpitations] : no palpitations [Syncope] : no syncope [Seasonal Allergies] : no seasonal allergies [GERD] : no gerd [Abdominal Pain] : no abdominal pain [Nausea] : no nausea [Vomiting] : no vomiting [Dysuria] : no dysuria [Back Pain] : no back pain [Rash] : no rash [Easy Bruising] : no easy bruising [Clotting Disorder/ Frequent bleeding] : no clotting disorder/ frequent bleeding [Headache] : no headache [Dizziness] : no dizziness

## 2024-09-03 NOTE — ASSESSMENT
[FreeTextEntry1] : 80 yo F w/ PMHx venous insufficiency (B/L), HTN, obesity, mediastinal mass s/p sternotomy with partial thyroidectomy (1996), hypothyroidism who presents for hospital follow-up after hospitalization at [facility] from 7/29-8/1 for acute hypoxic resp failure i/s/o COVID19 pneumonia treated with Dexamethasone/Remdesivir, Lasix x 1 and CAP coverage with Azith/Ceftriaxone.  Now back to baseline with no complaints.  PFTs (8/27/24): FEV1/FVC 81, FEV1 1.27 (84%), FVC 1.58 (80%), TLC 86%, %, DLCO 78% - no obstruction, normal lung volumes, mildly reduced diffusion capacity.  Recommend follow-up with Endocrinology given history of mediastinal mass and confirm no need for additonal imaging with NM thyroid scan.  Otherwise doing well from resp standpoint.  Can return for follow-up prn.  Ruth Gaytan MD, MSCR

## 2024-09-03 NOTE — HISTORY OF PRESENT ILLNESS
[Former] : former [TextBox_4] : 80 yo F w/ PMHx venous insufficiency (B/L), HTN, obesity, mediastinal mass s/p sternotomy with partial thyroidectomy (1996), hypothyroidism who presents for hospital follow-up after hospitalization at [facility] from 7/29-8/1 for acute hypoxic resp failure i/s/o COVID19 pneumonia.  who presents with progressive productive cough of white phlegm,CARVAJAL. Found to be in acute hypoxic respiratory failure secondary to COVID PNA.Started on Abx, Decadron/Remdesivir, with RRT called after admission for respiratory distress and volume overload. Patient received diuresis, and was titrated off oxygen during hospital course. Patient remained afebrile,hemodynamically stable and with improved respiratory status. Due to Superimposed Bacterial PNA secondary to COVID Started on Rocephin, stopped azithro, completed course 5 days Clinically was not volume overloaded per noted but did well post lasix x 1. S/P remdesvir and decadron MRSA neg SO2 in RA after ambulation WNL. Reports being back to baseline post discharge with no additional complaints.    Staph epidermidis bacteremia, likely contaminated, repeat neg.  Complains of diarrhea, likely 2/2 abx, added probiotic. diarrhea resolved      1/28/2023 CT4D scan showing enhancing nodule within the hemithyroidectomy bed to the left of the trachea suspicious for a parathyroid adenoma although this may represent a focus of residual thyroid tissue. 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   Smoked cigarettes (not heavy smoker) for about 10 years, quit 45 years ago. oalcohol intake   Symptoms: The patient is currently asymptomatic.   Associated symptoms: No associated symptoms are reported.  [TextBox_11] : 0.25 [TextBox_13] : 12 [YearQuit] : 1976

## 2024-09-03 NOTE — ASSESSMENT
[FreeTextEntry1] : 80 yo F w/ PMHx venous insufficiency (B/L), HTN, obesity, mediastinal mass s/p sternotomy with partial thyroidectomy (1996), hypothyroidism who presents for hospital follow-up after hospitalization at [facility] from 7/29-8/1 for acute hypoxic resp failure i/s/o COVID19 pneumonia treated with Dexamethasone/Remdesivir, Lasix x 1 and CAP coverage with Azith/Ceftriaxone.  Now back to baseline with no complaints.  PFTs (8/27/24): FEV1/FVC 81, FEV1 1.27 (84%), FVC 1.58 (80%), TLC 86%, %, DLCO 78% - no obstruction, normal lung volumes, mildly reduced diffusion capacity.  Recommend follow-up with Endocrinology given history of mediastinal mass and confirm no need for additonal imaging with NM thyroid scan.  Otherwise doing well from resp standpoint.  Can return for follow-up prn.  Ruth Gaytan MD, MSCR

## 2024-09-03 NOTE — HISTORY OF PRESENT ILLNESS
[Former] : former [TextBox_4] : 78 yo F w/ PMHx venous insufficiency (B/L), HTN, obesity, mediastinal mass s/p sternotomy with partial thyroidectomy (1996), hypothyroidism who presents for hospital follow-up after hospitalization at [facility] from 7/29-8/1 for acute hypoxic resp failure i/s/o COVID19 pneumonia.  who presents with progressive productive cough of white phlegm,CARVAJAL. Found to be in acute hypoxic respiratory failure secondary to COVID PNA.Started on Abx, Decadron/Remdesivir, with RRT called after admission for respiratory distress and volume overload. Patient received diuresis, and was titrated off oxygen during hospital course. Patient remained afebrile,hemodynamically stable and with improved respiratory status. Due to Superimposed Bacterial PNA secondary to COVID Started on Rocephin, stopped azithro, completed course 5 days Clinically was not volume overloaded per noted but did well post lasix x 1. S/P remdesvir and decadron MRSA neg SO2 in RA after ambulation WNL. Reports being back to baseline post discharge with no additional complaints.    Staph epidermidis bacteremia, likely contaminated, repeat neg.  Complains of diarrhea, likely 2/2 abx, added probiotic. diarrhea resolved      1/28/2023 CT4D scan showing enhancing nodule within the hemithyroidectomy bed to the left of the trachea suspicious for a parathyroid adenoma although this may represent a focus of residual thyroid tissue. 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   Smoked cigarettes (not heavy smoker) for about 10 years, quit 45 years ago. oalcohol intake   Symptoms: The patient is currently asymptomatic.   Associated symptoms: No associated symptoms are reported.  [TextBox_11] : 0.25 [TextBox_13] : 12 [YearQuit] : 1976

## 2024-12-31 NOTE — PHYSICAL THERAPY INITIAL EVALUATION ADULT - LEVEL OF INDEPENDENCE: SIT/SUPINE, REHAB EVAL
left sitting comfortably in chair in NAD, lines intact, RNs present in room/unable to perform Ambulatory

## 2025-05-20 ENCOUNTER — NON-APPOINTMENT (OUTPATIENT)
Age: 80
End: 2025-05-20

## 2025-05-22 ENCOUNTER — NON-APPOINTMENT (OUTPATIENT)
Age: 80
End: 2025-05-22

## 2025-05-22 ENCOUNTER — APPOINTMENT (OUTPATIENT)
Dept: CARDIOLOGY | Facility: CLINIC | Age: 80
End: 2025-05-22
Payer: MEDICARE

## 2025-05-22 VITALS
BODY MASS INDEX: 42.01 KG/M2 | SYSTOLIC BLOOD PRESSURE: 140 MMHG | OXYGEN SATURATION: 97 % | WEIGHT: 214 LBS | DIASTOLIC BLOOD PRESSURE: 68 MMHG | HEART RATE: 70 BPM | RESPIRATION RATE: 16 BRPM | HEIGHT: 60 IN

## 2025-05-22 DIAGNOSIS — E78.2 MIXED HYPERLIPIDEMIA: ICD-10-CM

## 2025-05-22 DIAGNOSIS — E11.9 TYPE 2 DIABETES MELLITUS W/OUT COMPLICATIONS: ICD-10-CM

## 2025-05-22 DIAGNOSIS — I10 ESSENTIAL (PRIMARY) HYPERTENSION: ICD-10-CM

## 2025-05-22 DIAGNOSIS — R60.0 LOCALIZED EDEMA: ICD-10-CM

## 2025-05-22 PROCEDURE — G2211 COMPLEX E/M VISIT ADD ON: CPT

## 2025-05-22 PROCEDURE — 93000 ELECTROCARDIOGRAM COMPLETE: CPT

## 2025-05-22 PROCEDURE — 99204 OFFICE O/P NEW MOD 45 MIN: CPT

## 2025-05-22 RX ORDER — SPIRONOLACTONE 25 MG/1
25 TABLET ORAL
Refills: 0 | Status: ACTIVE | COMMUNITY

## 2025-05-22 RX ORDER — INSULIN DEGLUDEC INJECTION 100 U/ML
100 INJECTION, SOLUTION SUBCUTANEOUS
Refills: 0 | Status: ACTIVE | COMMUNITY

## 2025-05-22 RX ORDER — DORZOLAMIDE HYDROCHLORIDE TIMOLOL MALEATE 20; 5 MG/ML; MG/ML
2-0.5 SOLUTION/ DROPS OPHTHALMIC
Refills: 0 | Status: ACTIVE | COMMUNITY

## 2025-08-27 ENCOUNTER — APPOINTMENT (OUTPATIENT)
Dept: CARDIOLOGY | Facility: CLINIC | Age: 80
End: 2025-08-27